# Patient Record
Sex: FEMALE | Race: WHITE | NOT HISPANIC OR LATINO | Employment: OTHER | URBAN - METROPOLITAN AREA
[De-identification: names, ages, dates, MRNs, and addresses within clinical notes are randomized per-mention and may not be internally consistent; named-entity substitution may affect disease eponyms.]

---

## 2017-01-12 PROBLEM — R94.31 SHORTENED PR INTERVAL: Status: ACTIVE | Noted: 2017-01-12

## 2017-01-16 RX ORDER — TOLTERODINE TARTRATE 2 MG/1
TABLET, EXTENDED RELEASE ORAL
Start: 2017-01-16

## 2017-01-16 NOTE — TELEPHONE ENCOUNTER
Detrol      Last Written Prescription Date: 11/03/2016  Last Fill Quantity: 180,  # refills: 0   Last Office Visit with [facility], [facility] or [facility] prescribing provider: 09/28/2016

## 2017-01-23 DIAGNOSIS — K22.70 BARRETT'S ESOPHAGUS WITHOUT DYSPLASIA: ICD-10-CM

## 2017-01-23 DIAGNOSIS — R32 INCONTINENCE: Primary | ICD-10-CM

## 2017-01-23 RX ORDER — TOLTERODINE TARTRATE 2 MG/1
TABLET, EXTENDED RELEASE ORAL
Qty: 60 TABLET | Refills: 0 | Status: SHIPPED | OUTPATIENT
Start: 2017-01-23 | End: 2017-03-01

## 2017-01-23 NOTE — TELEPHONE ENCOUNTER
MP,  Please advise in CW's absence.    Omeprazole:   Routing refill request to provider for review/approval because:  Last Rx was for BID instructions, per last OV note, pt now taking QD only    Detrol: Prescription approved per [facility] Refill Protocol.  Bernadette MCCULLOUGH RN    Pending Prescriptions:                       Disp   Refills    omeprazole (PRILOSEC) 20 MG CR capsule [Ph*90 cap*2        Sig: TAKE 1 CAPSULE (20 MG) BY MOUTH 2 TIMES DAILY    tolterodine (DETROL) 2 MG tablet [Pharmacy*180 ta*0        Sig: TAKE ONE TABLET BY MOUTH TWICE DAILY     Omeprazole      Last Written Prescription Date: 1/5/2016  Last Fill Quantity: 180,  # refills: 3   Last Office Visit with [facility], [facility] or [facility] prescribing provider: 9/28/2016                                             Detrol       Last Written Prescription Date: 11/3/2016  Last Fill Quantity: 180,  # refills: 0   Last Office Visit with [facility], [facility] or [facility] prescribing provider: 9/28/2016

## 2017-03-01 ENCOUNTER — OFFICE VISIT (OUTPATIENT)
Dept: FAMILY MEDICINE | Facility: CLINIC | Age: 72
End: 2017-03-01
Payer: COMMERCIAL

## 2017-03-01 VITALS
SYSTOLIC BLOOD PRESSURE: 130 MMHG | HEART RATE: 77 BPM | HEIGHT: 69 IN | BODY MASS INDEX: 40.46 KG/M2 | OXYGEN SATURATION: 97 % | WEIGHT: 273.2 LBS | TEMPERATURE: 97.7 F | DIASTOLIC BLOOD PRESSURE: 78 MMHG

## 2017-03-01 DIAGNOSIS — R32 INCONTINENCE: ICD-10-CM

## 2017-03-01 DIAGNOSIS — E78.5 HYPERLIPIDEMIA LDL GOAL <130: ICD-10-CM

## 2017-03-01 DIAGNOSIS — L60.2 ONYCHOGRYPOSIS: ICD-10-CM

## 2017-03-01 DIAGNOSIS — Z00.01 ENCOUNTER FOR ROUTINE ADULT MEDICAL EXAM WITH ABNORMAL FINDINGS: Primary | ICD-10-CM

## 2017-03-01 DIAGNOSIS — R60.1 GENERALIZED EDEMA: ICD-10-CM

## 2017-03-01 DIAGNOSIS — A60.04 HERPES SIMPLEX VULVOVAGINITIS: ICD-10-CM

## 2017-03-01 DIAGNOSIS — K22.70 BARRETT'S ESOPHAGUS WITHOUT DYSPLASIA: ICD-10-CM

## 2017-03-01 DIAGNOSIS — H90.8 MIXED HEARING LOSS: ICD-10-CM
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
## 2017-03-01 PROCEDURE — 80061 LIPID PANEL: CPT | Performed by: FAMILY MEDICINE

## 2017-03-01 PROCEDURE — 36415 COLL VENOUS BLD VENIPUNCTURE: CPT | Performed by: FAMILY MEDICINE

## 2017-03-01 PROCEDURE — 84443 ASSAY THYROID STIM HORMONE: CPT | Performed by: FAMILY MEDICINE

## 2017-03-01 PROCEDURE — 80053 COMPREHEN METABOLIC PANEL: CPT | Performed by: FAMILY MEDICINE

## 2017-03-01 PROCEDURE — 99397 PER PM REEVAL EST PAT 65+ YR: CPT | Performed by: FAMILY MEDICINE

## 2017-03-01 PROCEDURE — 86803 HEPATITIS C AB TEST: CPT | Performed by: FAMILY MEDICINE

## 2017-03-01 PROCEDURE — 82043 UR ALBUMIN QUANTITATIVE: CPT | Performed by: FAMILY MEDICINE

## 2017-03-01 RX ORDER — SIMVASTATIN 20 MG
TABLET ORAL
Qty: 90 TABLET | Refills: 3 | Status: SHIPPED | OUTPATIENT
Start: 2017-03-01 | End: 2018-04-11

## 2017-03-01 RX ORDER — HYDROCHLOROTHIAZIDE 25 MG/1
TABLET ORAL
Qty: 90 TABLET | Refills: 1 | Status: SHIPPED | OUTPATIENT
Start: 2017-03-01 | End: 2017-10-17

## 2017-03-01 RX ORDER — VALACYCLOVIR HYDROCHLORIDE 500 MG/1
TABLET, FILM COATED ORAL
Qty: 36 TABLET | Refills: 1 | Status: SHIPPED | OUTPATIENT
Start: 2017-03-01 | End: 2018-04-21

## 2017-03-01 RX ORDER — TOLTERODINE TARTRATE 2 MG/1
2 TABLET, EXTENDED RELEASE ORAL 2 TIMES DAILY
Qty: 180 TABLET | Refills: 1 | Status: SHIPPED | OUTPATIENT
Start: 2017-03-01 | End: 2017-09-10

## 2017-03-01 NOTE — PROGRESS NOTES
SUBJECTIVE:                                                            Hiro Carranza is a 71 year old female who presents for Preventive Visit.  Are you in the first 12 months of your Medicare Part B coverage?  No    Healthy Habits:    Do you get at least three servings of calcium containing foods daily (dairy, green leafy vegetables, etc.)? no, taking calcium and/or vitamin D supplement: yes - vitamins     Amount of exercise or daily activities, outside of work: not currently but committed to at least 5 days a week    Problems taking medications regularly No    Medication side effects: No    Have you had an eye exam in the past two years? yes    Do you see a dentist twice per year? Yearly     Do you have sleep apnea, excessive snoring or daytime drowsiness? little bit of apnea -had sleep study nothing concerning, did not rec cpap     COGNITIVE SCREEN  1) Repeat 3 items (Banana, Sunrise, Chair)    2) Clock draw: NORMAL  3) 3 item recall: Recalls 3 objects  Results: 3 items recalled: COGNITIVE IMPAIRMENT LESS LIKELY    Mini-CogTM Copyright S [employer]. Licensed by the author for use in [facility]; reprinted with permission (triny@.[email or web address]). All rights reserved.        --Thyroid/iodine concerns-  Had been buying salt without iodine- wondering if we can add thyroid check to labs.  Already getting salt with iodine in it at home, but hasn't had it for awhile.    --Stressors- moved her 97 yo mother into assisted living/NH.  's brother  recently of cancer, h/o crohns.    --Toenail clipping organization?? Having a hard time clipping her nails.    --Hearing loss in left ear.   is going to hearing aide place.    --Balance-   Hasn't had any more falls, didn't f/u on referral.  Does think she'd have trouble getting up if she did fall.  Can't kneel due to concerns about her knees.      Reviewed and updated as needed this visit by clinical staff  Tobacco  Allergies  Meds  Problems       Reviewed  "and updated as needed this visit by Provider  Allergies  Meds  Problems        Social History   Substance Use Topics     Smoking status: Former Smoker     Quit date: 1/1/1985     Smokeless tobacco: Never Used      Comment: 20 yrs smoking '65-'85, 1 PPD     Alcohol use 0.0 oz/week     0 Standard drinks or equivalent per week      Comment: Occasional 2 beers weekly. 1-2 glasses wime monthly       Once a week 1 glass of wine or beer    Today's PHQ-2 Score:   PHQ-2 ( 1999 Pf[employer]) 12/18/2015 7/1/2015   Q1: Little interest or pleasure in doing things 0 0   Q2: Feeling down, depressed or hopeless 0 0   PHQ-2 Score 0 0       Do you feel safe in your environment - Yes    Do you have a Health Care Directive?: Yes: Advance Directive has been received and scanned.    Current providers sharing in care for this patient include:   Patient Care Team:  Marie Robledo MD as PCP - General      Hearing impairment: Yes, Lt ear \"getting weak\"    Ability to successfully perform activities of daily living: Yes, no assistance needed     Fall risk:  Fallen 2 or more times in the past year?: No  Any fall with injury in the past year?: No    Home safety:  none identified    The following health maintenance items are reviewed in Epic and correct as of today:  Health Maintenance   Topic Date Due     PNEUMOCOCCAL (2 of 2 - PPSV23) 07/01/2016     FALL RISK ASSESSMENT  12/18/2016     INFLUENZA VACCINE (SYSTEM ASSIGNED)  09/01/2017     TETANUS IMMUNIZATION (SYSTEM ASSIGNED)  01/23/2018     MAMMO SCREEN Q2 YR (SYSTEM ASSIGNED)  02/04/2018     COLONOSCOPY Q10 YR INBASKET MESSAGE  02/22/2018     ADVANCE DIRECTIVE PLANNING Q5 YRS (NO INBASKET)  12/18/2020     LIPID SCREEN Q5 YR FEMALE (SYSTEM ASSIGNED)  03/01/2022     DEXA SCAN SCREENING (SYSTEM ASSIGNED)  Completed     HEPATITIS C SCREENING  Completed       Patient Active Problem List   Diagnosis     Stallings esophagus     Incontinence     Pain in shoulder     Osteopenia     Onychogryposis " and onychomycosis     Hyperlipidemia LDL goal <130     Other viral warts     Genital herpes     Advance Care Planning     Gastroesophageal reflux disease with esophagitis     Other dental procedure status     Arthritis of knee, right     Acute posthemorrhagic anemia     Physical deconditioning     Constipation     MARK (obstructive sleep apnea)(Mild AHI=5)     Calculus of gallbladder without cholecystitis without obstruction     Cholelithiases     Shortened CA interval      Current Outpatient Prescriptions   Medication Sig Dispense Refill     tolterodine (DETROL) 2 MG tablet Take 1 tablet (2 mg) by mouth 2 times daily 180 tablet 1     simvastatin (ZOCOR) 20 MG tablet TAKE 1 TABLET (20 MG) BY MOUTH AT BEDTIME 90 tablet 3     omeprazole (PRILOSEC) 20 MG CR capsule Take 1 capsule (20 mg) by mouth 2 times daily 180 capsule 1     valACYclovir (VALTREX) 500 MG tablet TAKE 1 TABLET (500 MG) BY MOUTH 2 TIMES DAILY 36 tablet 1     hydrochlorothiazide (HYDRODIURIL) 25 MG tablet TAKE 1 TABLET (25 MG) BY MOUTH DAILY 90 tablet 1     Probiotic Product (PROBIOTIC PO) Take by mouth daily       Cholecalciferol (VITAMIN D3 PO) Take by mouth daily       GLUCOSAMINE-CHONDROITIN PO        polyethylene glycol 0.4%- propylene glycol 0.3% (SYSTANE ULTRA) 0.4-0.3 % SOLN ophthalmic solution Place 1 drop into both eyes At Bedtime       clotrimazole (LOTRIMIN) 1 % cream Apply topically 2 times daily 60 g 1     DAILY MULTIVITAMIN PO DAILY       FISH  MG OR CAPS 2 tablets daily           Allergies   Allergen Reactions     No Known Drug Allergies         ROS:  Constitutional, HEENT, cardiovascular, pulmonary, gi and gu systems are negative, except as otherwise noted.    Problem list, Medication list, Allergies, and Medical/Social/Surgical histories reviewed in [facility] and updated as appropriate.  Labs reviewed in EPIC  BP Readings from Last 3 Encounters:   03/01/17 130/78   09/28/16 126/80   08/05/16 116/40    Wt Readings from Last 3  "Encounters:   03/01/17 273 lb 3.2 oz (123.9 kg)   09/28/16 274 lb 6.4 oz (124.5 kg)   08/04/16 273 lb 8 oz (124.1 kg)                  Recent Labs   Lab Test  03/01/17   1226  07/13/16   1128  06/28/16   1454 06/21/16   12/18/15   1309   12/03/14   1320   LDL  97   --    --    --    --   85   --   93   HDL  65   --    --    --    --   75   --   69   TRIG  107   --    --    --    --   65   --   90   ALT  19   --   26  37   --   21   --    --    CR  0.69  0.72  0.76  0.82   < >  0.66   < >  0.71   GFRESTIMATED  83  80  75  >60   < >  89   < >  81   GFRESTBLACK  >90   GFR Calc    >90   GFR Calc    >90   GFR Calc     --    < >  >90   GFR Calc     < >  >90   GFR Calc     POTASSIUM  4.7  4.1  4.2  3.3   < >  4.1   < >  4.3   TSH  1.13   --    --    --    --    --    --    --     < > = values in this interval not displayed.      OBJECTIVE:                                                            /78  Pulse 77  Temp 97.7  F (36.5  C) (Oral)  Ht 5' 9\" (1.753 m)  Wt 273 lb 3.2 oz (123.9 kg)  SpO2 97%  Breastfeeding? No  BMI 40.34 kg/m2 Estimated body mass index is 40.34 kg/(m^2) as calculated from the following:    Height as of this encounter: 5' 9\" (1.753 m).    Weight as of this encounter: 273 lb 3.2 oz (123.9 kg).  EXAM:   GENERAL APPEARANCE: healthy, alert and no distress  EYES: Eyes grossly normal to inspection, PERRL and conjunctivae and sclerae normal  HENT: ear canals and TM's normal, nose and mouth without ulcers or lesions, oropharynx clear and oral mucous membranes moist  NECK: no adenopathy, no asymmetry, masses, or scars and thyroid normal to palpation  RESP: lungs clear to auscultation - no rales, rhonchi or wheezes  BREAST: normal without masses, tenderness or nipple discharge and no palpable axillary masses or adenopathy  CV: regular rate and rhythm, normal S1 S2, no S3 or S4, no murmur, click or rub, no peripheral edema " and peripheral pulses strong  ABDOMEN: soft, nontender, no hepatosplenomegaly, no masses and bowel sounds normal  MS: no musculoskeletal defects are noted and gait is age appropriate without ataxia  SKIN: no suspicious lesions or rashes  NEURO: Normal strength and tone, sensory exam grossly normal, mentation intact and speech normal  PSYCH: mentation appears normal and affect normal/bright    ASSESSMENT / PLAN:                                                                ICD-10-CM    1. Encounter for routine adult medical exam with abnormal findings Z00.01 Hepatitis C Screen Reflex to HCV RNA Quant and Genotype     Albumin Random Urine Quantitative   2. Fall, subsequent encounter W19.XXXD TSH with free T4 reflex     PHYSICAL THERAPY REFERRAL   3. Mixed hearing loss H90.8 TSH with free T4 reflex     AUDIOLOGY ADULT REFERRAL   4. Onychogryposis and onychomycosis L60.2    5. Incontinence R32 tolterodine (DETROL) 2 MG tablet   6. Hyperlipidemia LDL goal <130 E78.5 Lipid Profile with reflex to direct LDL     Comprehensive metabolic panel (BMP + Alb, Alk Phos, ALT, AST, Total. Bili, TP)     simvastatin (ZOCOR) 20 MG tablet   7. 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 Bili, TP)     omeprazole (PRILOSEC) 20 MG CR capsule   8. Herpes simplex vulvovaginitis A60.04 valACYclovir (VALTREX) 500 MG tablet   9. Generalized edema R60.1 TSH with free T4 reflex     Comprehensive metabolic panel (BMP + Alb, Alk Phos, ALT, AST, Total. Bili, TP)     hydrochlorothiazide (HYDRODIURIL) 25 MG tablet     CPE- Discussed diet, calcium and exercise.  Went over Self Breast Exam.  Thin prep pap was not done.  Eyes and Teeth or UTD or recommended f/u.  No immunizations needed today.  See orders below for tests ordered and screening needed.      H/o Falls- none recently, but feels like she would have a hard time getting up if she falls.  Referred for mobility/balance rehab  "with OT.    Hearing concerns- referred to audiology.    H/o onychomycosis/difficulty trimming nails- triage helped find resource information- in pt instructions as well.  Toenail Groups-  [facility] [phone]  [facility] [phone]    Lipids- on simvastatin- discussed new guidelines, can consider switching to other statin, but she's been stable without se's, so will cont for now.  Fasting today- will check labs.    Stallings's- UTD on scopes- due for f/u next year.  Cont PPI per GI recs.    Herpes- valtrex continues to help- refills sent.    Edema- cont HCTZ- helpful.  BP borderline today.    Shortened PA interval- reviewed with pt - shortened PA, but stable over the years, and normal QRS.      End of Life Planning:  Patient currently has an advanced directive: Yes.  Practitioner is supportive of decision.    COUNSELING:  Reviewed preventive health counseling, as reflected in patient instructions    BP Screening:   Last 3 BP Readings:    BP Readings from Last 3 Encounters:   03/01/17 130/78   09/28/16 126/80   08/05/16 116/40       The following was recommended to the patient:  Re-screen BP within a year and recommended lifestyle modifications    Estimated body mass index is 40.34 kg/(m^2) as calculated from the following:    Height as of this encounter: 5' 9\" (1.753 m).    Weight as of this encounter: 273 lb 3.2 oz (123.9 kg).  Weight management plan: Cont work on diet/exercise   reports that she quit smoking about 32 years ago. She has never used smokeless tobacco.      Appropriate preventive services were discussed with this patient, including applicable screening as appropriate for cardiovascular disease, diabetes, osteopenia/osteoporosis, and glaucoma.  As appropriate for age/gender, discussed screening for colorectal cancer, prostate cancer, breast cancer, and cervical cancer. Checklist reviewing preventive services available has been given to the patient.    Reviewed patients plan of care and provided an " AVS. The Basic Care Plan (routine screening as documented in Health Maintenance) for Hiro meets the Care Plan requirement. This Care Plan has been established and reviewed with the Patient.    Counseling Resources:  ATP IV Guidelines  Pooled Cohorts Equation Calculator  Breast Cancer Risk Calculator  FRAX Risk Assessment  ICSI Preventive Guidelines  Dietary Guidelines for Americans, 2010  USDA's MyPlate  ASA Prophylaxis  Lung CA Screening    Marie Robledo MD  [facility]

## 2017-03-01 NOTE — PATIENT INSTRUCTIONS
Toenail Groups-  [facility] [phone]  [facility] [phone]        Preventive Health Recommendations    Female Ages 65 +    Yearly exam:     See your health care provider every year in order to  o Review health changes.   o Discuss preventive care.    o Review your medicines if your doctor has prescribed any.      You no longer need a yearly Pap test unless you've had an abnormal Pap test in the past 10 years. If you have vaginal symptoms, such as bleeding or discharge, be sure to talk with your provider about a Pap test.      Every 1 to 2 years, have a mammogram.  If you are over 69, talk with your health care provider about whether or not you want to continue having screening mammograms.      Every 10 years, have a colonoscopy. Or, have a yearly FIT test (stool test). These exams will check for colon cancer.       Have a cholesterol test every 5 years, or more often if your doctor advises it.       Have a diabetes test (fasting glucose) every three years. If you are at risk for diabetes, you should have this test more often.       At age 65, have a bone density scan (DEXA) to check for osteoporosis (brittle bone disease).    Shots:    Get a flu shot each year.    Get a tetanus shot every 10 years.    Talk to your doctor about your pneumonia vaccines. There are now two you should receive - Pneumovax (PPSV 23) and Prevnar (PCV 13).    Talk to your doctor about the shingles vaccine.    Talk to your doctor about the hepatitis B vaccine.    Nutrition:     Eat at least 5 servings of fruits and vegetables each day.      Eat whole-grain bread, whole-wheat pasta and brown rice instead of white grains and rice.      Talk to your provider about Calcium and Vitamin D.     Lifestyle    Exercise at least 150 minutes a week (30 minutes a day, 5 days a week). This will help you control your weight and prevent disease.      Limit alcohol to one drink per day.      No smoking.       Wear sunscreen to prevent skin cancer.        See your dentist twice a year for an exam and cleaning.      See your eye doctor every 1 to 2 years to screen for conditions such as glaucoma, macular degeneration and cataracts.

## 2017-03-01 NOTE — LETTER
[facility]  [address], [address]  [place], [place] [address]  [phone]     March 7, 2017     Hiro Carranza                                                                                                                               [address] [place] [id number]        Dear Hiro,    Here are your lab results from your recent visit...  -Your CMP (which includes electrolyte levels, blood sugar levels, and kidney and liver function tests) looks very good.  -Your microalbumin level (which is the urine test that can signal signs of early chronic kidney disease if elevated to >30) is low, which is also good to see.  -Your TSH (thyroid stimulating hormone, which is elevated in hypothyroidism, and lowered in hyperthyroidism) looks good as well.  -Your cholesterol panel looks very good with a low LDL (the bad cholesterol) and a higher HDL (the good cholesterol).   -Your hepatitis C antibody is negative.  We've been checking this as a one-time screening test for those born between 1945 and 1965 due to the slightly higher risk in this age group.  You should not need to be tested for this again.       Please let me know if you have any questions.        Sincerely,    Marie Robledo MD      Clinic hours:  Monday   7:30 AM - 5:00 PM    Tuesday  7:00 AM - 7:00 PM    Wednesday  7:00 AM - 5:00 PM    Thursday  7:30 AM - 7:00 PM    Friday   7:30 AM - 5:00 PM

## 2017-03-01 NOTE — MR AVS SNAPSHOT
After Visit Summary   3/1/2017    Hiro Carranza    MRN: [id number]           Patient Information     Date Of Birth          1945        Visit Information        Provider Department      3/1/2017 10:30 AM Marie Robledo MD [facility]        Today's Diagnoses     Encounter for routine adult medical exam with abnormal findings    -  1    Fall, subsequent encounter        Mixed hearing loss        Onychogryposis and onychomycosis        Incontinence        Hyperlipidemia LDL goal <130        Stallings's esophagus without dysplasia        Herpes simplex vulvovaginitis        Generalized edema          Care Instructions    Toenail Groups-  [employer] [phone]  [employer] [phone]        Preventive Health Recommendations    Female Ages 65 +    Yearly exam:     See your health care provider every year in order to  o Review health changes.   o Discuss preventive care.    o Review your medicines if your doctor has prescribed any.      You no longer need a yearly Pap test unless you've had an abnormal Pap test in the past 10 years. If you have vaginal symptoms, such as bleeding or discharge, be sure to talk with your provider about a Pap test.      Every 1 to 2 years, have a mammogram.  If you are over 69, talk with your health care provider about whether or not you want to continue having screening mammograms.      Every 10 years, have a colonoscopy. Or, have a yearly FIT test (stool test). These exams will check for colon cancer.       Have a cholesterol test every 5 years, or more often if your doctor advises it.       Have a diabetes test (fasting glucose) every three years. If you are at risk for diabetes, you should have this test more often.       At age 65, have a bone density scan (DEXA) to check for osteoporosis (brittle bone disease).    Shots:    Get a flu shot each year.    Get a tetanus shot every 10 years.    Talk to your doctor about your pneumonia vaccines. There  are now two you should receive - Pneumovax (PPSV 23) and Prevnar (PCV 13).    Talk to your doctor about the shingles vaccine.    Talk to your doctor about the hepatitis B vaccine.    Nutrition:     Eat at least 5 servings of fruits and vegetables each day.      Eat whole-grain bread, whole-wheat pasta and brown rice instead of white grains and rice.      Talk to your provider about Calcium and Vitamin D.     Lifestyle    Exercise at least 150 minutes a week (30 minutes a day, 5 days a week). This will help you control your weight and prevent disease.      Limit alcohol to one drink per day.      No smoking.       Wear sunscreen to prevent skin cancer.       See your dentist twice a year for an exam and cleaning.      See your eye doctor every 1 to 2 years to screen for conditions such as glaucoma, macular degeneration and cataracts.        Follow-ups after your visit        Additional Services     AUDIOLOGY ADULT REFERRAL       Your provider has referred you to: [facility]: Audiology and Aural Rehab Services [facility] [phone]   [email or web address]  [facility]: [facility] [phone]   [email or web address]    Specialty Testing:  Audiogram Only            PHYSICAL THERAPY REFERRAL       *This therapy referral will be filtered to a centralized scheduling office at [facility] and the patient will receive a call to schedule an appointment at a [place] location most convenient for them. *     [facility] provides Physical Therapy evaluation and treatment and many specialty services across the [facility].  If requesting a specialty program, please choose from the list below.    If you have not heard from the scheduling office within 2 business days, please call [phone] for all locations, with the exception of [place], please call  "[phone].  Treatment: Evaluation & Treatment  Special Instructions/Modalities:   Special Programs: Balance/Vestibular  - mobility concerns, concerns about getting up if she falls    Please be aware that coverage of these services is subject to the terms and limitations of your health insurance plan.  Call member services at your health plan with any benefit or coverage questions.      **Note to Provider:  If you are referring outside of [place] for the therapy appointment, please list the name of the location in the  special instructions  above, print the referral and give to the patient to schedule the appointment.                  Who to contact     If you have questions or need follow up information about today's clinic visit or your schedule please contact [facility] directly at [phone].  Normal or non-critical lab and imaging results will be communicated to you by [employer], letter or phone within 4 business days after the clinic has received the results. If you do not hear from us within 7 days, please contact the clinic through [employer] or phone. If you have a critical or abnormal lab result, we will notify you by phone as soon as possible.  Submit refill requests through [employer] or call your pharmacy and they will forward the refill request to us. Please allow 3 business days for your refill to be completed.          Additional Information About Your Visit        [employer] Information     [employer] lets you send messages to your doctor, view your test results, renew your prescriptions, schedule appointments and more. To sign up, go to [email or web address] . Click on \"Log in\" on the left side of the screen, which will take you to the Welcome page. Then click on \"Sign up Now\" on the right side of the page.     You will be asked to enter the access code listed below, as well as some personal information. Please follow the directions to create your username and password.     Your access code " "is: [email or web address]  Expires: 2017 12:02 PM     Your access code will  in 90 days. If you need help or a new code, please call your [place] clinic or [phone].        Care EveryWhere ID     This is your Care EveryWhere ID. This could be used by other organizations to access your [place] medical records  [id number]        Your Vitals Were     Pulse Temperature Height Pulse Oximetry Breastfeeding? BMI (Body Mass Index)    77 97.7  F (36.5  C) (Oral) 5' 9\" (1.753 m) 97% No 40.34 kg/m2       Blood Pressure from Last 3 Encounters:   17 130/78   16 126/80   16 116/40    Weight from Last 3 Encounters:   17 273 lb 3.2 oz (123.9 kg)   16 274 lb 6.4 oz (124.5 kg)   16 273 lb 8 oz (124.1 kg)              We Performed the Following     Albumin Random Urine Quantitative     AUDIOLOGY ADULT REFERRAL     Comprehensive metabolic panel (BMP + Alb, Alk Phos, ALT, AST, Total. Bili, TP)     Hepatitis C Screen Reflex to HCV RNA Quant and Genotype     Lipid Profile with reflex to direct LDL     PHYSICAL THERAPY REFERRAL     TSH with free T4 reflex          Today's Medication Changes          These changes are accurate as of: 3/1/17 12:02 PM.  If you have any questions, ask your nurse or doctor.               These medicines have changed or have updated prescriptions.        Dose/Directions    simvastatin 20 MG tablet   Commonly known as:  ZOCOR   This may have changed:  See the new instructions.   Used for:  Hyperlipidemia LDL goal <130   Changed by:  Marie Robledo MD        TAKE 1 TABLET (20 MG) BY MOUTH AT BEDTIME   Quantity:  90 tablet   Refills:  3       tolterodine 2 MG tablet   Commonly known as:  DETROL   This may have changed:  See the new instructions.   Used for:  Incontinence   Changed by:  Marie Robledo MD        Dose:  2 mg   Take 1 tablet (2 mg) by mouth 2 times daily   Quantity:  180 tablet   Refills:  1       valACYclovir 500 MG tablet   Commonly known " as:  VALTREX   This may have changed:  See the new instructions.   Used for:  Herpes simplex vulvovaginitis   Changed by:  Marie Robledo MD        TAKE 1 TABLET (500 MG) BY MOUTH 2 TIMES DAILY   Quantity:  36 tablet   Refills:  1            Where to get your medicines      These medications were sent to [id number] - [place], [place] - [address]  [address] [facility] [address]     Phone:  [phone]     hydrochlorothiazide 25 MG tablet    omeprazole 20 MG CR capsule    simvastatin 20 MG tablet    tolterodine 2 MG tablet    valACYclovir 500 MG tablet                Primary Care Provider Office Phone # Fax #    Marie Robledo [phone]       [facility] [address]  [facility] [address]        Thank you!     Thank you for choosing [facility]  for your care. Our goal is always to provide you with excellent care. Hearing back from our patients is one way we can continue to improve our services. Please take a few minutes to complete the written survey that you may receive in the mail after your visit with us. Thank you!             Your Updated Medication List - Protect others around you: Learn how to safely use, store and throw away your medicines at www.disposemymeds.org.          This list is accurate as of: 3/1/17 12:02 PM.  Always use your most recent med list.                   Brand Name Dispense Instructions for use    clotrimazole 1 % cream    LOTRIMIN    60 g    Apply topically 2 times daily       DAILY MULTIVITAMIN PO      DAILY       Fish Oil 500 MG Caps      2 tablets daily       GLUCOSAMINE-CHONDROITIN PO          hydrochlorothiazide 25 MG tablet    HYDRODIURIL    90 tablet    TAKE 1 TABLET (25 MG) BY MOUTH DAILY       omeprazole 20 MG CR capsule    priLOSEC    180 capsule    Take 1 capsule (20 mg) by mouth 2 times daily       polyethylene glycol 0.4%- propylene glycol 0.3% 0.4-0.3 % Soln ophthalmic solution    SYSTANE  ULTRA     Place 1 drop into both eyes At Bedtime       PROBIOTIC PO      Take by mouth daily       simvastatin 20 MG tablet    ZOCOR    90 tablet    TAKE 1 TABLET (20 MG) BY MOUTH AT BEDTIME       tolterodine 2 MG tablet    DETROL    180 tablet    Take 1 tablet (2 mg) by mouth 2 times daily       valACYclovir 500 MG tablet    VALTREX    36 tablet    TAKE 1 TABLET (500 MG) BY MOUTH 2 TIMES DAILY       VITAMIN D3 PO      Take by mouth daily

## 2017-03-02 LAB
ALBUMIN SERPL-MCNC: 3.7 G/DL (ref 3.4–5)
ALP SERPL-CCNC: 99 U/L (ref 40–150)
ALT SERPL W P-5'-P-CCNC: 19 U/L (ref 0–50)
ANION GAP SERPL CALCULATED.3IONS-SCNC: 6 MMOL/L (ref 3–14)
AST SERPL W P-5'-P-CCNC: 17 U/L (ref 0–45)
BILIRUB SERPL-MCNC: 0.6 MG/DL (ref 0.2–1.3)
BUN SERPL-MCNC: 15 MG/DL (ref 7–30)
CALCIUM SERPL-MCNC: 9.3 MG/DL (ref 8.5–10.1)
CHLORIDE SERPL-SCNC: 106 MMOL/L (ref 94–109)
CHOLEST SERPL-MCNC: 183 MG/DL
CO2 SERPL-SCNC: 30 MMOL/L (ref 20–32)
CREAT SERPL-MCNC: 0.69 MG/DL (ref 0.52–1.04)
CREAT UR-MCNC: 157 MG/DL
GFR SERPL CREATININE-BSD FRML MDRD: 83 ML/MIN/1.7M2
GLUCOSE SERPL-MCNC: 86 MG/DL (ref 70–99)
HCV AB SERPL QL IA: NORMAL
HDLC SERPL-MCNC: 65 MG/DL
LDLC SERPL CALC-MCNC: 97 MG/DL
MICROALBUMIN UR-MCNC: 7 MG/L
MICROALBUMIN/CREAT UR: 4.29 MG/G CR (ref 0–25)
NONHDLC SERPL-MCNC: 118 MG/DL
POTASSIUM SERPL-SCNC: 4.7 MMOL/L (ref 3.4–5.3)
PROT SERPL-MCNC: 7.6 G/DL (ref 6.8–8.8)
SODIUM SERPL-SCNC: 142 MMOL/L (ref 133–144)
TRIGL SERPL-MCNC: 107 MG/DL
TSH SERPL DL<=0.005 MIU/L-ACNC: 1.13 MU/L (ref 0.4–4)

## 2017-03-07 NOTE — PROGRESS NOTES
Please send letter below with copy of results.  Thanks! CW    Here are your lab results from your recent visit...  -Your CMP (which includes electrolyte levels, blood sugar levels, and kidney and liver function tests) looks very good.  -Your microalbumin level (which is the urine test that can signal signs of early chronic kidney disease if elevated to >30) is low, which is also good to see.  -Your TSH (thyroid stimulating hormone, which is elevated in hypothyroidism, and lowered in hyperthyroidism) looks good as well.  -Your cholesterol panel looks very good with a low LDL (the bad cholesterol) and a higher HDL (the good cholesterol).   -Your hepatitis C antibody is negative.  We've been checking this as a one-time screening test for those born between 1945 and 1965 due to the slightly higher risk in this age group.  You should not need to be tested for this again.       Please let me know if you have any questions.  Best,   Mitchell Robledo MD

## 2017-04-10 ENCOUNTER — HOSPITAL ENCOUNTER (OUTPATIENT)
Dept: PHYSICAL THERAPY | Facility: CLINIC | Age: 72
Setting detail: THERAPIES SERIES
End: 2017-04-10
Attending: FAMILY MEDICINE
Payer: MEDICARE

## 2017-04-10 PROCEDURE — 40000719 ZZHC STATISTIC PT DEPARTMENT NEURO VISIT: Performed by: PHYSICAL THERAPIST

## 2017-04-10 PROCEDURE — 97110 THERAPEUTIC EXERCISES: CPT | Mod: GP | Performed by: PHYSICAL THERAPIST

## 2017-04-10 PROCEDURE — G8978 MOBILITY CURRENT STATUS: HCPCS | Mod: GP,CJ | Performed by: PHYSICAL THERAPIST

## 2017-04-10 PROCEDURE — G8979 MOBILITY GOAL STATUS: HCPCS | Mod: GP,CJ | Performed by: PHYSICAL THERAPIST

## 2017-04-10 PROCEDURE — 97161 PT EVAL LOW COMPLEX 20 MIN: CPT | Mod: GP | Performed by: PHYSICAL THERAPIST

## 2017-04-10 NOTE — PROGRESS NOTES
[facility]        OUTPATIENT PHYSICAL THERAPY FUNCTIONAL EVALUATION  PLAN OF TREATMENT FOR OUTPATIENT REHABILITATION  (COMPLETE FOR INITIAL CLAIMS ONLY)  Patient's Last Name, First Name, M.I.  YOB: 1945  Hiro Carranza     Provider's Name   [facility]   Medical Record No.  [id number]     Start of Care Date:  04/10/17   Onset Date:  03/01/17   Type:     _X__PT   ____OT  ____SLP Medical Diagnosis:  Falls, subsequent encounter     PT Diagnosis:  difficulty with gait Visits from SOC:  1                              __________________________________________________________________________________  Plan of Treatment/Functional Goals:  balance training, gait training, ROM, strengthening, stretching           GOALS  FLOOR TRANSFER  1. Patient will be independent with a supine<>stand floor transfer to ensure improved confidence if pt were to fall she'd be able to rise up to standing.  06/09/17    ACTIVITY TOLERANCE  2. Patient will tolerate greater than 30 minutes of continous aerobic activity and LE strengthening exercises at least 5 days per week to assist with management of knee pain   06/09/17    Therapy Frequency:  1 time/week   Predicted Duration of Therapy Intervention:  5 weeks    Alia Cervantes, PT                                    I CERTIFY THE NEED FOR THESE SERVICES FURNISHED UNDER        THIS PLAN OF TREATMENT AND WHILE UNDER MY CARE     (Physician co-signature of this document indicates review and certification of the therapy plan).                Certification Date From:  04/10/17   Certification Date To:  06/09/17    Referring Provider:  Dr. Marie Robledo    Initial Assessment  See Epic Evaluation- Start of Care Date: 04/10/17

## 2017-04-10 NOTE — PROGRESS NOTES
04/10/17 1100   Quick Adds   Type of Visit Initial OP PT Evaluation   General Information   Start of Care Date 04/10/17   Referring Physician Dr. Marie Deluna   Orders Evaluate and Treat as Indicated   Order Date 03/01/17   Medical Diagnosis Falls, subsequential encounter   Onset of illness/injury or Date of Surgery 03/01/17   Precautions/Limitations fall precautions   Surgical/Medical history reviewed Yes   Pertinent history of current problem (include personal factors and/or comorbidities that impact the POC) Pt went in for routine yearly follow up with PCP and discussed concern to rise from standing if pt were to fall and PT order generated. Pt has not fallen in past year. PMH significant for Bilateral knee replacements 2007 and 2015, L RTC injury   Prior level of function comment independent   Current Community Support Family/friend caregiver   Patient role/Employment history Retired   Living environment House/[facility]   Home/Community Accessibility Comments 15 steps into basement and 3 steps to enter home   Current Assistive Devices Four Wheeled Walker;Standard Cane   Assistive Devices Comments Uses cane occasionally on unven surfaces on walks, patient's mother recently passed away and will be able to inherit 4WW for use   Patient/Family Goals Statement I want to be able to get up after I fall and start exercising again.   General Information Comments Pt's mother passed away 2 weeks ago and  is have shoulder scope this week. Pt lives in [place] and drives 40 miles for appointments   Fall Risk Screen   Fall screen completed by PT   Per patient - Fall 2 or more times in past year? No   Per patient - Fall with injury in past year? No   Timed Up and Go score (seconds) 12.2   Is patient a fall risk? No   Pain   Patient currently in pain Yes   Pain location bilateral knee joints, left greater than right.   Pain comments Pain is worse in prolonged sitting, walking feels best   Cognitive Status  Examination   Orientation orientation to person, place and time   Level of Consciousness alert   Follows Commands and Answers Questions 100% of the time   Range of Motion (ROM)   ROM Comment Lacks end range extension bilaterally by less than 5 degrees   Strength   Strength Comments Bilateral hip flexion 4-/5, knee flex/ext 4/5, ankle DF/PF   Bed Mobility   Bed Mobility Comments independent   Transfer Skills   Transfer Comments prefers to use UE to assist up to standing but is able to perform without from a higher surface.    Gait   Gait Comments Ambulates into clinic unaided, decreased step length but no LOB and appropriate foot clearance.   Balance   Balance Comments independently maintains NBOS EC x30s without LOB, increased sway and self corrects.    Sensory Examination   Sensory Perception Comments denies N/T   Planned Therapy Interventions   Planned Therapy Interventions balance training;gait training;ROM;strengthening;stretching   Clinical Impression   Criteria for Skilled Therapeutic Interventions Met yes, treatment indicated   PT Diagnosis difficulty with gait   Influenced by the following impairments fear of falling, decreased strength and unable to return to standing from seated or supine position without external assistance   Functional limitations due to impairments difficulty with gait, fear of falling and impaired balance during ourdoor ambulation   Clinical Presentation Stable/Uncomplicated   Clinical Presentation Rationale progressing as expected, falls occured 1+ year ago   Clinical Decision Making (Complexity) Low complexity   Therapy Frequency 1 time/week   Predicted Duration of Therapy Intervention (days/wks) 5 weeks   Risk & Benefits of therapy have been explained Yes   Patient, Family & other staff in agreement with plan of care Yes   Education Assessment   Preferred Learning Style Listening   Barriers to Learning No barriers   GOALS   PT Eval Goals 1;2   Goal 1   Goal Identifier FLOOR TRANSFER    Goal Description 1. Patient will be independent with a supine<>stand floor transfer to ensure improved confidence if pt were to fall she'd be able to rise up to standing.   Target Date 06/09/17   Goal 2   Goal Identifier ACTIVITY TOLERANCE   Goal Description 2. Patient will tolerate greater than 30 minutes of continous aerobic activity and LE strengthening exercises at least 5 days per week to assist with management of knee pain    Target Date 06/09/17   Total Evaluation Time   Total Evaluation Time (Minutes) 20   Therapy Certification   Certification date from 04/10/17   Certification date to 06/09/17   Medical Diagnosis Falls, subsequent encounter

## 2017-04-20 ENCOUNTER — HOSPITAL ENCOUNTER (OUTPATIENT)
Dept: PHYSICAL THERAPY | Facility: CLINIC | Age: 72
Setting detail: THERAPIES SERIES
End: 2017-04-20
Attending: FAMILY MEDICINE
Payer: MEDICARE

## 2017-04-20 PROCEDURE — 40000185 ZZHC STATISTIC PT OUTPT VISIT: Performed by: PHYSICAL THERAPIST

## 2017-04-20 PROCEDURE — 97110 THERAPEUTIC EXERCISES: CPT | Mod: GP | Performed by: PHYSICAL THERAPIST

## 2017-04-24 ENCOUNTER — HOSPITAL ENCOUNTER (OUTPATIENT)
Dept: PHYSICAL THERAPY | Facility: CLINIC | Age: 72
Setting detail: THERAPIES SERIES
End: 2017-04-24
Attending: FAMILY MEDICINE
Payer: MEDICARE

## 2017-04-24 PROCEDURE — 97110 THERAPEUTIC EXERCISES: CPT | Mod: GP | Performed by: PHYSICAL THERAPIST

## 2017-04-24 PROCEDURE — 40000185 ZZHC STATISTIC PT OUTPT VISIT: Performed by: PHYSICAL THERAPIST

## 2017-05-01 ENCOUNTER — HOSPITAL ENCOUNTER (OUTPATIENT)
Dept: PHYSICAL THERAPY | Facility: CLINIC | Age: 72
Setting detail: THERAPIES SERIES
End: 2017-05-01
Attending: FAMILY MEDICINE
Payer: MEDICARE

## 2017-05-01 PROCEDURE — 40000185 ZZHC STATISTIC PT OUTPT VISIT: Performed by: PHYSICAL THERAPIST

## 2017-05-01 PROCEDURE — 97110 THERAPEUTIC EXERCISES: CPT | Mod: GP | Performed by: PHYSICAL THERAPIST

## 2017-05-08 ENCOUNTER — HOSPITAL ENCOUNTER (OUTPATIENT)
Dept: PHYSICAL THERAPY | Facility: CLINIC | Age: 72
Setting detail: THERAPIES SERIES
End: 2017-05-08
Attending: FAMILY MEDICINE
Payer: MEDICARE

## 2017-05-08 PROCEDURE — 97110 THERAPEUTIC EXERCISES: CPT | Mod: GP | Performed by: PHYSICAL THERAPIST

## 2017-05-08 PROCEDURE — 40000719 ZZHC STATISTIC PT DEPARTMENT NEURO VISIT: Performed by: PHYSICAL THERAPIST

## 2017-06-01 ENCOUNTER — HOSPITAL ENCOUNTER (OUTPATIENT)
Dept: PHYSICAL THERAPY | Facility: CLINIC | Age: 72
Setting detail: THERAPIES SERIES
End: 2017-06-01
Attending: FAMILY MEDICINE
Payer: MEDICARE

## 2017-06-01 PROCEDURE — 97110 THERAPEUTIC EXERCISES: CPT | Mod: GP | Performed by: PHYSICAL THERAPIST

## 2017-06-01 PROCEDURE — G8979 MOBILITY GOAL STATUS: HCPCS | Mod: GP,CJ | Performed by: PHYSICAL THERAPIST

## 2017-06-01 PROCEDURE — G8980 MOBILITY D/C STATUS: HCPCS | Mod: GP,CJ | Performed by: PHYSICAL THERAPIST

## 2017-06-01 PROCEDURE — 40000185 ZZHC STATISTIC PT OUTPT VISIT: Performed by: PHYSICAL THERAPIST

## 2017-06-01 NOTE — PROGRESS NOTES
Outpatient Physical Therapy Discharge Note     Patient: Hiro Carranza  : 1945    Beginning/End Dates of Reporting Period:  4/10/17 to 2017    Referring Provider: Dr. Marie Deluna    Therapy Diagnosis: Difficulty with gait     Client Self Report: I've been faithful with most of my exercises, but a few I haven't been so good about doing.     Outcome Measures (most recent score):  AM-PAC  Basic Mobility Score Level  (Lower scores equate to lower levels of function): 57.62  AM-PAC  Daily Activity Score Level  (Lower scores equate to lower levels of function): 49.26  AM-PAC  Applied Cognitive Score Level  (Lower scores equate to lower levels of function): 50.85      Goals:  Goal Identifier FLOOR TRANSFER   Goal Description 1. Patient will be independent with a supine<>stand floor transfer to ensure improved confidence if pt were to fall she'd be able to rise up to standing.   Target Date 17   Date Met      Progress: goal met, very effortful     Goal Identifier ACTIVITY TOLERANCE   Goal Description 2. Patient will tolerate greater than 30 minutes of continous aerobic activity and LE strengthening exercises at least 5 days per week to assist with management of knee pain    Target Date 17   Date Met  17   Progress: GOAL MET         Progress Toward Goals:   Progress this reporting period: Goals met. Pt having ongoing knee pain and educated on specific strengthening exercises and an exercise program to continue with. Pt feels she will be able to follow through this summer and continue to perform walking/biking and strengthening exercises.     Plan:  Discharge from therapy.    Discharge:    Reason for Discharge: Patient has met all goals.    Equipment Issued: none    Discharge Plan: Patient to continue home program.    Thank you for the referral  Alia Cervantes DPT  [facility]  [phone]

## 2017-06-29 DIAGNOSIS — B37.2 INTERTRIGINOUS CANDIDIASIS: ICD-10-CM

## 2017-06-30 RX ORDER — CLOTRIMAZOLE 1 %
CREAM (GRAM) TOPICAL
Qty: 60 G | Refills: 0 | Status: SHIPPED | OUTPATIENT
Start: 2017-06-30 | End: 2017-10-17

## 2017-06-30 NOTE — TELEPHONE ENCOUNTER
Routing refill request to provider for review/approval because:  Drug not on the [facility] refill protocol   A break in medication  Looks like foot issues somewhat discussed (as far as nails go); was this discussed too?  Bernadette MCCULLOUGH RN

## 2017-06-30 NOTE — TELEPHONE ENCOUNTER
Ana Luisarimin       Last Written Prescription Date:  6-2-15  Last Fill Quantity: 60g,   # refills: 1  Last Office Visit with [facility], [facility] or  Health prescribing provider: 3-1-17  Future Office visit:       Routing refill request to provider for review/approval because:  Drug not on the [facility], [facility] or  Health refill protocol or controlled substance

## 2017-07-25 DIAGNOSIS — R60.1 GENERALIZED EDEMA: ICD-10-CM

## 2017-07-26 RX ORDER — HYDROCHLOROTHIAZIDE 25 MG/1
TABLET ORAL
Start: 2017-07-26

## 2017-09-10 DIAGNOSIS — K22.70 BARRETT'S ESOPHAGUS WITHOUT DYSPLASIA: ICD-10-CM

## 2017-09-10 DIAGNOSIS — N39.46 MIXED STRESS AND URGE URINARY INCONTINENCE: ICD-10-CM

## 2017-09-11 NOTE — TELEPHONE ENCOUNTER
Routing refill request to provider for review/approval because:  Do you want to see pt for 6 month f/u appt?  F/U not noted in recent OV  Thanks,  Bernadette MCCULLOUGH, RN    Pending Prescriptions:                       Disp   Refills    tolterodine (DETROL) 2 MG tablet [Pharmacy*180 ta*0        Sig: TAKE ONE TABLET BY MOUTH TWICE DAILY     omeprazole (PRILOSEC) 20 MG CR capsule [Ph*180 ca*0        Sig: TAKE ONE CAPSULE BY MOUTH TWICE DAILY     Detrol      Last Written Prescription Date: 3/1/2017  Last Fill Quantity: 180,  # refills: 1   Last Office Visit with [facility], [facility] or [facility] prescribing provider: 3/1/2017    Omeprazole      Last Written Prescription Date: 3/1/2017  Last Fill Quantity: 180,  # refills: 1   Last Office Visit with [facility], [facility] or [facility] prescribing provider: 3/1/2017

## 2017-09-14 RX ORDER — TOLTERODINE TARTRATE 2 MG/1
TABLET, EXTENDED RELEASE ORAL
Qty: 60 TABLET | Refills: 0 | Status: SHIPPED | OUTPATIENT
Start: 2017-09-14 | End: 2017-10-17

## 2017-09-14 NOTE — TELEPHONE ENCOUNTER
Sent in 1mo refills-  Nothing may have changed, but I am managing several meds/chronic conditions that are taking up too much of her yearly preventive visits- see last a/p.  Apologize for the delay, but would like her to come in for 6mo f/u as we have discussed.    Thank you!  CW

## 2017-09-14 NOTE — TELEPHONE ENCOUNTER
Left non detailed VM for pt asking that they callback and schedule appt with PCP within 1 month  Bernadette MCCULLOUGH RN

## 2017-09-14 NOTE — TELEPHONE ENCOUNTER
Pharmacy asked patient to call and check on script.      She does not believe that she will need to be seen yet, as nothing has changed.  Please let her know if she does need to schedule as she needs her medications.

## 2017-10-17 ENCOUNTER — OFFICE VISIT (OUTPATIENT)
Dept: FAMILY MEDICINE | Facility: CLINIC | Age: 72
End: 2017-10-17
Payer: COMMERCIAL

## 2017-10-17 VITALS
BODY MASS INDEX: 41.54 KG/M2 | OXYGEN SATURATION: 98 % | HEIGHT: 69 IN | WEIGHT: 280.5 LBS | SYSTOLIC BLOOD PRESSURE: 124 MMHG | DIASTOLIC BLOOD PRESSURE: 74 MMHG | HEART RATE: 89 BPM | TEMPERATURE: 97.3 F

## 2017-10-17 DIAGNOSIS — B37.2 INTERTRIGINOUS CANDIDIASIS: ICD-10-CM

## 2017-10-17 DIAGNOSIS — N39.46 MIXED STRESS AND URGE URINARY INCONTINENCE: ICD-10-CM

## 2017-10-17 DIAGNOSIS — Z23 NEED FOR PROPHYLACTIC VACCINATION AND INOCULATION AGAINST INFLUENZA: ICD-10-CM

## 2017-10-17 DIAGNOSIS — K22.70 BARRETT'S ESOPHAGUS WITHOUT DYSPLASIA: Primary | ICD-10-CM

## 2017-10-17 DIAGNOSIS — E78.5 HYPERLIPIDEMIA LDL GOAL <130: ICD-10-CM

## 2017-10-17 DIAGNOSIS — R60.1 GENERALIZED EDEMA: ICD-10-CM

## 2017-10-17 PROCEDURE — 90662 IIV NO PRSV INCREASED AG IM: CPT | Performed by: FAMILY MEDICINE

## 2017-10-17 PROCEDURE — 90471 IMMUNIZATION ADMIN: CPT | Performed by: FAMILY MEDICINE

## 2017-10-17 PROCEDURE — 99214 OFFICE O/P EST MOD 30 MIN: CPT | Mod: 25 | Performed by: FAMILY MEDICINE

## 2017-10-17 RX ORDER — TOLTERODINE TARTRATE 2 MG/1
2 TABLET, EXTENDED RELEASE ORAL 2 TIMES DAILY
Qty: 180 TABLET | Refills: 1 | Status: SHIPPED | OUTPATIENT
Start: 2017-10-17 | End: 2018-04-21

## 2017-10-17 RX ORDER — HYDROCHLOROTHIAZIDE 25 MG/1
TABLET ORAL
Qty: 90 TABLET | Refills: 1 | Status: SHIPPED | OUTPATIENT
Start: 2017-10-17 | End: 2018-04-21

## 2017-10-17 RX ORDER — CLOTRIMAZOLE 1 %
CREAM (GRAM) TOPICAL
Qty: 60 G | Refills: 0 | Status: SHIPPED | OUTPATIENT
Start: 2017-10-17 | End: 2018-06-08

## 2017-10-17 NOTE — MR AVS SNAPSHOT
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  Normal or non-critical lab and imaging results will be communicated to you by MyChart, letter or phone within 4 business days after the clinic has received the results. If you do not hear from us within 7 days, please contact the clinic through MyChart or phone. If you have a critical or abnormal lab result, we will notify you by phone as soon as possible.  Submit refill requests through [employer] or call your pharmacy and they will forward the refill request to us. Please allow 3 business days for your refill to be completed.          Additional Information About Your Visit        MyChart Information     [employer] lets you send messages to your doctor, view your test results, renew your prescriptions, schedule appointments and more. To sign up, go to [email or web address] . Click on \"Log in\" on the left side of the screen, which will take you to the Welcome page. Then click on \"Sign up Now\" on the right side of the page.     You will be asked to enter the access code listed below, as well as some personal information. Please follow the directions to create your username and password.     Your access code is: [email or web address]  Expires: 1/15/2018 " "11:32 AM     Your access code will  in 90 days. If you need help or a new code, please call your [facility] clinic or [phone].        Care EveryWhere ID     This is your Care EveryWhere ID. This could be used by other organizations to access your [facility] medical records  [id number]        Your Vitals Were     Pulse Temperature Height Pulse Oximetry Breastfeeding? BMI (Body Mass Index)    89 97.3  F (36.3  C) (Oral) 5' 9\" (1.753 m) 98% No 41.42 kg/m2       Blood Pressure from Last 3 Encounters:   10/17/17 124/74   17 130/78   16 126/80    Weight from Last 3 Encounters:   10/17/17 280 lb 8 oz (127.2 kg)   17 273 lb 3.2 oz (123.9 kg)   16 274 lb 6.4 oz (124.5 kg)              We Performed the Following     FLU VACCINE, INCREASED ANTIGEN, PRESV FREE, AGE 65+ [18950]     Vaccine Administration, Initial [72082]          Today's Medication Changes          These changes are accurate as of: 10/17/17 11:32 AM.  If you have any questions, ask your nurse or doctor.               These medicines have changed or have updated prescriptions.        Dose/Directions    clotrimazole 1 % cream   Commonly known as:  LOTRIMIN   This may have changed:  See the new instructions.   Used for:  Intertriginous candidiasis   Changed by:  Marie Robledo MD        APPLY TOPICALLY 2 TIMES DAILY   Quantity:  60 g   Refills:  0       omeprazole 20 MG CR capsule   Commonly known as:  priLOSEC   This may have changed:  See the new instructions.   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   Used for:  Mixed stress and urge urinary incontinence   Changed by:  Marie Robledo MD        Dose:  2 mg   Take 1 tablet (2 mg) by mouth 2 times " daily   Quantity:  180 tablet   Refills:  1            Where to get your medicines      These medications were sent to [id number] - [place], [place] - [address]  [address] [facility] [address]     Phone:  [phone]     clotrimazole 1 % cream    hydrochlorothiazide 25 MG tablet    omeprazole 20 MG CR capsule    tolterodine 2 MG tablet                Primary Care Provider Office Phone # Fax #    Marie Robledo [phone] [address] [address]        Equal Access to Services     [facility]: Hadii aad ku hadasho Soomaali, waaxda luqadaha, qaybta kaalmada adeegyada, hany johns . So [facility] [phone].    ATENCIÓN: Si habla español, tiene a choi disposición servicios gratuitos de asistencia lingüística. [facility] [phone].    We comply with applicable federal civil rights laws and [place] laws. We do not discriminate on the basis of race, color, national origin, age, disability, sex, sexual orientation, or gender identity.            Thank you!     Thank you for choosing [facility]  for your care. Our goal is always to provide you with excellent care. Hearing back from our patients is one way we can continue to improve our services. Please take a few minutes to complete the written survey that you may receive in the mail after your visit with us. Thank you!             Your Updated Medication List - Protect others around you: Learn how to safely use, store and throw away your medicines at www.disposemymeds.org.          This list is accurate as of: 10/17/17 11:32 AM.  Always use your most recent med list.                   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       polyethylene glycol 0.4%- propylene glycol 0.3% 0.4-0.3 % Soln ophthalmic solution    SYSTANE ULTRA     Place 1 drop into both eyes At Bedtime        PROBIOTIC PO      Take by mouth daily        simvastatin 20 MG tablet    ZOCOR    90 tablet    TAKE 1 TABLET (20 MG) BY MOUTH AT BEDTIME    Hyperlipidemia LDL goal <130       tolterodine 2 MG tablet    DETROL    180 tablet    Take 1 tablet (2 mg) by mouth 2 times daily    Mixed stress and urge urinary incontinence       valACYclovir 500 MG tablet    VALTREX    36 tablet    TAKE 1 TABLET (500 MG) BY MOUTH 2 TIMES DAILY    Herpes simplex vulvovaginitis       VITAMIN D3 PO      Take by mouth daily

## 2017-10-17 NOTE — PROGRESS NOTES
SUBJECTIVE:   Hiro Carranza is a 72 year old female who presents to clinic today for the following health issues:    Hyperlipidemia Follow-Up      Rate your low fat/cholesterol diet?: good    Taking statin?  No    Other lipid medications/supplements?:  none    Hypertension Follow-up      Outpatient blood pressures are not being checked.    Low Salt Diet: low salt    Amount of exercise or physical activity: lately has been reduced    Problems taking medications regularly: No but sometimes Statin taking at bedtime    Medication side effects: none    Diet: regular (no restrictions)    Mother  on 3/26/17-  She had lung cancer for a couple yrs, had gotten more and more tired.  Pt had been seeing her in nursing home most days.  Notes it was a quick, fairly easy death.  Had the service in .  Now busy clearing out things, and dealing with even an easy estate is tough.  Sister Celeste is her best friend, so that is good.  Recently went through photos- way back to great, great, grandmother.  Mostly good, time for tears and joys.      [facility]- was really helpful, but has fallen off, needs to get to doing them.  Fewer aches when she's doing it and moving around more.    Medication questions- sometimes forgets the night-time medication.  Can switch to take with evening meds.    GERD  Went down to one tab, for a couple months, but sx's returned, so went back to 2 tabs/day.  Has been mostly symptom-free for ~3 months.  Has been careful with evening diet.  Sometimes takes tums.      Probiotic use- for gas, not sure it's all that helpful.  Does use gas-x- somewhat helpful.      Problem list and histories reviewed & adjusted, as indicated.  Additional history: as documented    Patient Active Problem List   Diagnosis     Stallings esophagus     Mixed stress and urge urinary incontinence     Pain in shoulder     Osteopenia     Onychogryposis and onychomycosis     Hyperlipidemia LDL goal <130      Other viral warts     Genital herpes     Advance Care Planning     Gastroesophageal reflux disease with esophagitis     Other dental procedure status     Arthritis of knee, right     Acute posthemorrhagic anemia     Physical deconditioning     Constipation     MARK (obstructive sleep apnea)(Mild AHI=5)     Calculus of gallbladder without cholecystitis without obstruction     Cholelithiases     Shortened [place] interval      Current Outpatient Prescriptions   Medication Sig Dispense Refill     omeprazole (PRILOSEC) 20 MG CR capsule Take 1 capsule (20 mg) by mouth 2 times daily 180 capsule 1     hydrochlorothiazide (HYDRODIURIL) 25 MG tablet TAKE 1 TABLET (25 MG) BY MOUTH DAILY 90 tablet 1     tolterodine (DETROL) 2 MG tablet Take 1 tablet (2 mg) by mouth 2 times daily 180 tablet 1     clotrimazole (LOTRIMIN) 1 % cream APPLY TOPICALLY 2 TIMES DAILY 60 g 0     simvastatin (ZOCOR) 20 MG tablet TAKE 1 TABLET (20 MG) BY MOUTH AT BEDTIME 90 tablet 3     valACYclovir (VALTREX) 500 MG tablet TAKE 1 TABLET (500 MG) BY MOUTH 2 TIMES DAILY 36 tablet 1     Probiotic Product (PROBIOTIC PO) Take by mouth daily       Cholecalciferol (VITAMIN D3 PO) Take by mouth daily       GLUCOSAMINE-CHONDROITIN PO        polyethylene glycol 0.4%- propylene glycol 0.3% (SYSTANE ULTRA) 0.4-0.3 % SOLN ophthalmic solution Place 1 drop into both eyes At Bedtime       DAILY MULTIVITAMIN PO DAILY       FISH  MG OR CAPS 2 tablets daily       Allergies   Allergen Reactions     No Known Drug Allergies      Recent Labs   Lab Test  03/01/17   1226  07/13/16   1128  06/28/16   1454 06/21/16   12/18/15   1309   12/03/14   1320   LDL  97   --    --    --    --   85   --   93   HDL  65   --    --    --    --   75   --   69   TRIG  107   --    --    --    --   65   --   90   ALT  19   --   26  37   --   21   < >   --    CR  0.69  0.72  0.76  0.82   < >  0.66   < >  0.71   GFRESTIMATED  83  80  75  >60   < >  89   < >  81   GFRESTBLACK  >90    "GFR Calc    >90   GFR Calc    >90   GFR Calc     --    < >  >90   GFR Calc     < >  >90   GFR Calc     POTASSIUM  4.7  4.1  4.2  3.3   < >  4.1   < >  4.3   TSH  1.13   --    --    --    --    --    --    --     < > = values in this interval not displayed.      BP Readings from Last 3 Encounters:   10/17/17 124/74   03/01/17 130/78   09/28/16 126/80    Wt Readings from Last 3 Encounters:   10/17/17 280 lb 8 oz (127.2 kg)   03/01/17 273 lb 3.2 oz (123.9 kg)   09/28/16 274 lb 6.4 oz (124.5 kg)            Labs reviewed in EPIC      Reviewed and updated as needed this visit by clinical staffTobacco  Allergies  Meds  Problems       Reviewed and updated as needed this visit by Provider  Allergies  Meds  Problems          ROS:  Constitutional, HEENT, cardiovascular, pulmonary, gi and gu systems are negative, except as otherwise noted.      OBJECTIVE:   /74  Pulse 89  Temp 97.3  F (36.3  C) (Oral)  Ht 5' 9\" (1.753 m)  Wt 280 lb 8 oz (127.2 kg)  SpO2 98%  Breastfeeding? No  BMI 41.42 kg/m2  Body mass index is 41.42 kg/(m^2).  GENERAL APPEARANCE: healthy, alert and no distress     EYES: PERRL, sclera clear     HENT: nose and mouth without ulcers or lesions     NECK: no adenopathy, no asymmetry, masses, or scars and thyroid normal to palpation     RESP: lungs clear to auscultation - no rales, rhonchi or wheezes     CV: regular rates and rhythm, normal S1 S2, no S3 or S4 and no murmur, click or rub      Abdomen: soft, nontender, no HSM or masses and bowel sounds normal     Ext: warm, dry, 1-2+ edema in bilateral lower legs      ASSESSMENT/PLAN:       ICD-10-CM    1. 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 Generalized edema R60.1 hydrochlorothiazide (HYDRODIURIL) 25 MG tablet   3. Mixed stress and urge urinary incontinence N39.46 tolterodine (DETROL) 2 MG tablet   4. Intertriginous candidiasis " B37.2 clotrimazole (LOTRIMIN) 1 % cream   5. Hyperlipidemia LDL goal <130 E78.5    6. Need for prophylactic vaccination and inoculation against influenza Z23 FLU VACCINE, INCREASED ANTIGEN, PRESV FREE, AGE 65+ [44523]     Vaccine Administration, Initial [97372]     --Stable chronic issues.  Social issues- still working on getting estate things finalized with mother's death in 3/17.  Good death for her mother, though- her mother was ready, and she and her family was well supported.  Pt now working on getting back into exercise and balance exercises- had gotten off them with mother's death.  --GERD/Barretts- reviewed Dr. Aguilera's recs, so will cont on BID PPI due to Dr. Aguilera's recs and sx return the last time she tried QD dosing.  --Cont on other current meds.  Detrol helpful, and notices when she misses a dose.  --Refills sent x 6 months.  --Labs next visit- should come fasting.  --Flu vaccine- Risks/benefits discussed, given today.     Marie Robledo MD  [facility]

## 2017-11-17 ENCOUNTER — TELEPHONE (OUTPATIENT)
Dept: FAMILY MEDICINE | Facility: CLINIC | Age: 72
End: 2017-11-17

## 2017-11-17 NOTE — TELEPHONE ENCOUNTER
CW,  Patient states she has seen advertisements for [facility] Screening.  Wants to know what she thinks about this.  States it screens for:  Carotid Artery Disease Screening   Abdominal Aortic Aneurysm Screening   Atrial Fibrillation Screening   Peripheral Arterial Disease Screening   Osteoporosis Screening/Bone Density Test   High Cholesterol Screening/Lipid Panel Test   Elevated Liver Enzymes Screening   Chronic Kidney Disease (CKD) Screening   Type 2 Diabetes Screening Health Risk Assessment - 6 for Life   C-Reactive Protein Screening   Thyroid Disease Screening   Vitamin D Screening   Prostate Cancer Screening   Colorectal Cancer Screening   Testosterone Deficiency Screening    Here is their website if you want more info: [email or web address]-We-[email or web address]-[email or web address]  Please advise.  Bernadette MCCULLOUGH RN

## 2017-11-17 NOTE — TELEPHONE ENCOUNTER
Reason for Call:  Life Line Screening    Detailed comments: Pt calling because she is thinking of getting a Life Line Screening,   She would like to see what Dr. Robledo thinks about this.   She wants to know if Dr. Robledo thinks this would be useful     Phone Number Patient can be reached at: Home number on file [phone] (home)    Best Time: anytime    Can we leave a detailed message on this number? YES    Call taken on 11/17/2017 at 1:22 PM by Renita Holden

## 2017-11-21 NOTE — TELEPHONE ENCOUNTER
I have conflicting feelings about this type of screening- easier to explain at an office visit. Some information can end up being helpful, some doesn't hurt or help, and some can cause additional issues because we need to do additional work-up or treatments that may not end up being helpful.  If it's not too expensive for her, and she'd like to do it, though, I'm fine with it.  Thanks- CW

## 2017-11-27 ENCOUNTER — TELEPHONE (OUTPATIENT)
Dept: FAMILY MEDICINE | Facility: CLINIC | Age: 72
End: 2017-11-27

## 2017-11-27 NOTE — TELEPHONE ENCOUNTER
Last colonoscopy 2/22/2008  Due for colonoscopy February 2018  Told pt  staff may have been looking ahead, but yes, not quite due for this yet.  Pt would like to discuss this at physical with CW (due 3/2/2018 or later)  Bernadette MCCULLOUGH RN

## 2017-11-27 NOTE — TELEPHONE ENCOUNTER
Reason for Call: call back    Detailed comments: Pt states that she got a mailing stating that she was due for her colonoscopy. She says that this doesn't seem right, and she was hoping someone would be able to advise her if they knew when it was time for her to start scheduling this. Please call to advise.     Phone Number Patient can be reached at: Cell number on file:    Telephone Information:   Mobile [phone]       Best Time: any    Can we leave a detailed message on this number? YES    Call taken on 11/27/2017 at 1:17 PM by Merle Sauer

## 2018-04-11 DIAGNOSIS — E78.5 HYPERLIPIDEMIA LDL GOAL <130: ICD-10-CM

## 2018-04-11 NOTE — TELEPHONE ENCOUNTER
"Requested Prescriptions   Pending Prescriptions Disp Refills     simvastatin (ZOCOR) 20 MG tablet [Pharmacy Med Name: Simvastatin Oral Tablet 20 MG] 90 tablet 2     Sig: TAKE ONE TABLET BY MOUTH AT BEDTIME    Statins Protocol Failed    4/11/2018  3:22 PM       Failed - LDL on file in past 12 months    Recent Labs   Lab Test  03/01/17   1226   LDL  97            Passed - No abnormal creatine kinase in past 12 months    No lab results found.            Passed - Recent (12 mo) or future (30 days) visit within the authorizing provider's specialty    Patient had office visit in the last 12 months or has a visit in the next 30 days with authorizing provider or within the authorizing provider's specialty.  See \"Patient Info\" tab in inbasket, or \"Choose Columns\" in Meds & Orders section of the refill encounter.           Passed - Patient is age 18 or older       Passed - No active pregnancy on record       Passed - No positive pregnancy test in past 12 months        "

## 2018-04-12 RX ORDER — SIMVASTATIN 20 MG
TABLET ORAL
Qty: 30 TABLET | Refills: 0 | Status: SHIPPED | OUTPATIENT
Start: 2018-04-12 | End: 2018-05-01

## 2018-04-12 NOTE — TELEPHONE ENCOUNTER
Medication is being filled for 1 time refill only due to:  Patient needs to be seen because it has been more than one year since last visit.   Due for physical and fasting labs.  Last 3/1/17  Aarti Black RN

## 2018-04-21 DIAGNOSIS — K22.70 BARRETT'S ESOPHAGUS WITHOUT DYSPLASIA: ICD-10-CM

## 2018-04-21 DIAGNOSIS — R60.1 GENERALIZED EDEMA: ICD-10-CM

## 2018-04-21 DIAGNOSIS — N39.46 MIXED STRESS AND URGE URINARY INCONTINENCE: ICD-10-CM

## 2018-04-21 DIAGNOSIS — A60.04 HERPES SIMPLEX VULVOVAGINITIS: ICD-10-CM

## 2018-04-23 ENCOUNTER — TELEPHONE (OUTPATIENT)
Dept: FAMILY MEDICINE | Facility: CLINIC | Age: 73
End: 2018-04-23

## 2018-04-23 RX ORDER — HYDROCHLOROTHIAZIDE 25 MG/1
TABLET ORAL
Qty: 30 TABLET | Refills: 0 | Status: SHIPPED | OUTPATIENT
Start: 2018-04-23 | End: 2018-05-01

## 2018-04-23 RX ORDER — TOLTERODINE TARTRATE 2 MG/1
TABLET, EXTENDED RELEASE ORAL
Qty: 60 TABLET | Refills: 0 | Status: SHIPPED | OUTPATIENT
Start: 2018-04-23 | End: 2018-05-01

## 2018-04-23 RX ORDER — VALACYCLOVIR HYDROCHLORIDE 500 MG/1
TABLET, FILM COATED ORAL
Qty: 36 TABLET | Refills: 0 | Status: SHIPPED | OUTPATIENT
Start: 2018-04-23 | End: 2018-08-07

## 2018-04-23 NOTE — TELEPHONE ENCOUNTER
"Medication is being filled for 1 time refill only due to:  Patient needs to be seen because due for 6 month f/u and yearly labs.   Bernadette MCCULLOUGH RN    Requested Prescriptions   Pending Prescriptions Disp Refills     valACYclovir (VALTREX) 500 MG tablet [Pharmacy Med Name: ValACYclovir HCl Oral Tablet 500 MG] 36 tablet 0     Sig: TAKE ONE TABLET BY MOUTH TWICE DAILY    Antivirals for Herpes Protocol Failed    4/21/2018  3:50 PM       Failed - Normal serum creatinine on file in past 12 months    Recent Labs   Lab Test  03/01/17   1226   CR  0.69            Passed - Patient is age 12 or older       Passed - Recent (12 mo) or future (30 days) visit within the authorizing provider's specialty    Patient had office visit in the last 12 months or has a visit in the next 30 days with authorizing provider or within the authorizing provider's specialty.  See \"Patient Info\" tab in in[employer], or \"Choose Columns\" in Meds & Orders section of the refill encounter.            omeprazole (PRILOSEC) 20 MG CR capsule [Pharmacy Med Name: Omeprazole Oral Capsule Delayed Release 20 MG] 180 capsule 0     Sig: TAKE ONE CAPSULE BY MOUTH TWICE DAILY    PPI Protocol Passed    4/21/2018  3:50 PM       Passed - Not on Clopidogrel (unless Pantoprazole ordered)       Passed - No diagnosis of osteoporosis on record       Passed - Recent (12 mo) or future (30 days) visit within the authorizing provider's specialty    Patient had office visit in the last 12 months or has a visit in the next 30 days with authorizing provider or within the authorizing provider's specialty.  See \"Patient Info\" tab in [employer], or \"Choose Columns\" in Meds & Orders section of the refill encounter.           Passed - Patient is age 18 or older       Passed - No active pregnacy on record       Passed - No positive pregnancy test in past 12 months        hydrochlorothiazide (HYDRODIURIL) 25 MG tablet [Pharmacy Med Name: HydroCHLOROthiazide Oral Tablet 25 MG] 90 tablet 0     " "Sig: TAKE ONE TABLET BY MOUTH ONCE DAILY    Diuretics (Including Combos) Protocol Failed    4/21/2018  3:50 PM       Failed - Normal serum creatinine on file in past 12 months    Recent Labs   Lab Test  03/01/17   1226   CR  0.69             Failed - Normal serum potassium on file in past 12 months    Recent Labs   Lab Test  03/01/17   1226   POTASSIUM  4.7                   Failed - Normal serum sodium on file in past 12 months    Recent Labs   Lab Test  03/01/17   1226   NA  142             Passed - Blood pressure under 140/90 in past 12 months    BP Readings from Last 3 Encounters:   10/17/17 124/74   03/01/17 130/78   09/28/16 126/80                Passed - Recent (12 mo) or future (30 days) visit within the authorizing provider's specialty    Patient had office visit in the last 12 months or has a visit in the next 30 days with authorizing provider or within the authorizing provider's specialty.  See \"Patient Info\" tab in inbasket, or \"Choose Columns\" in Meds & Orders section of the refill encounter.           Passed - Patient is age 18 or older       Passed - No active pregancy on record       Passed - No positive pregnancy test in past 12 months        tolterodine (DETROL) 2 MG tablet [Pharmacy Med Name: Tolterodine Tartrate Oral Tablet 2 MG] 180 tablet 0     Sig: TAKE ONE TABLET BY MOUTH TWICE DAILY    Muscarinic Antagonists (Urinary Incontinence Agents) Passed    4/21/2018  3:50 PM       Passed - Recent (12 mo) or future (30 days) visit within the authorizing provider's specialty    Patient had office visit in the last 12 months or has a visit in the next 30 days with authorizing provider or within the authorizing provider's specialty.  See \"Patient Info\" tab in inepicurioet, or \"Choose Columns\" in Meds & Orders section of the refill encounter.           Passed - Patient does not have a diagnosis of glaucoma on the problem list    If glaucoma diagnosis is new, refer refill to physician.         Passed - Patient " is 18 years of age or older

## 2018-04-28 ENCOUNTER — HEALTH MAINTENANCE LETTER (OUTPATIENT)
Age: 73
End: 2018-04-28

## 2018-05-01 ENCOUNTER — OFFICE VISIT (OUTPATIENT)
Dept: FAMILY MEDICINE | Facility: CLINIC | Age: 73
End: 2018-05-01
Payer: COMMERCIAL

## 2018-05-01 VITALS
BODY MASS INDEX: 41.75 KG/M2 | TEMPERATURE: 97.6 F | OXYGEN SATURATION: 95 % | HEIGHT: 69 IN | WEIGHT: 281.9 LBS | DIASTOLIC BLOOD PRESSURE: 92 MMHG | HEART RATE: 88 BPM | SYSTOLIC BLOOD PRESSURE: 143 MMHG

## 2018-05-01 DIAGNOSIS — R60.1 GENERALIZED EDEMA: ICD-10-CM

## 2018-05-01 DIAGNOSIS — K22.70 BARRETT'S ESOPHAGUS WITHOUT DYSPLASIA: ICD-10-CM

## 2018-05-01 DIAGNOSIS — Z23 NEED FOR TDAP VACCINATION: ICD-10-CM

## 2018-05-01 DIAGNOSIS — K21.00 GASTROESOPHAGEAL REFLUX DISEASE WITH ESOPHAGITIS: ICD-10-CM

## 2018-05-01 DIAGNOSIS — E78.5 HYPERLIPIDEMIA LDL GOAL <130: ICD-10-CM

## 2018-05-01 DIAGNOSIS — Z00.00 ENCOUNTER FOR ROUTINE ADULT HEALTH EXAMINATION WITHOUT ABNORMAL FINDINGS: Primary | ICD-10-CM

## 2018-05-01 DIAGNOSIS — G47.33 OSA (OBSTRUCTIVE SLEEP APNEA): ICD-10-CM

## 2018-05-01 DIAGNOSIS — A60.04 HERPES SIMPLEX VULVOVAGINITIS: ICD-10-CM

## 2018-05-01 DIAGNOSIS — N39.46 MIXED STRESS AND URGE URINARY INCONTINENCE: ICD-10-CM

## 2018-05-01 LAB
CREAT UR-MCNC: 25 MG/DL
MICROALBUMIN UR-MCNC: <5 MG/L
MICROALBUMIN/CREAT UR: NORMAL MG/G CR (ref 0–25)

## 2018-05-01 PROCEDURE — G0439 PPPS, SUBSEQ VISIT: HCPCS | Mod: 25 | Performed by: FAMILY MEDICINE

## 2018-05-01 PROCEDURE — 36415 COLL VENOUS BLD VENIPUNCTURE: CPT | Performed by: FAMILY MEDICINE

## 2018-05-01 PROCEDURE — 82043 UR ALBUMIN QUANTITATIVE: CPT | Performed by: FAMILY MEDICINE

## 2018-05-01 PROCEDURE — 90471 IMMUNIZATION ADMIN: CPT | Performed by: FAMILY MEDICINE

## 2018-05-01 PROCEDURE — 80061 LIPID PANEL: CPT | Performed by: FAMILY MEDICINE

## 2018-05-01 PROCEDURE — 90715 TDAP VACCINE 7 YRS/> IM: CPT | Performed by: FAMILY MEDICINE

## 2018-05-01 PROCEDURE — 80048 BASIC METABOLIC PNL TOTAL CA: CPT | Performed by: FAMILY MEDICINE

## 2018-05-01 RX ORDER — HYDROCHLOROTHIAZIDE 25 MG/1
25 TABLET ORAL DAILY
Qty: 90 TABLET | Refills: 1 | Status: SHIPPED | OUTPATIENT
Start: 2018-05-01 | End: 2018-11-02

## 2018-05-01 RX ORDER — TOLTERODINE TARTRATE 2 MG/1
2 TABLET, EXTENDED RELEASE ORAL 2 TIMES DAILY
Qty: 180 TABLET | Refills: 1 | Status: SHIPPED | OUTPATIENT
Start: 2018-05-01 | End: 2018-11-02

## 2018-05-01 RX ORDER — CLOTRIMAZOLE 1 %
CREAM (GRAM) TOPICAL
Qty: 60 G | Refills: 0 | Status: CANCELLED | OUTPATIENT
Start: 2018-05-01

## 2018-05-01 RX ORDER — SIMVASTATIN 20 MG
TABLET ORAL
Qty: 90 TABLET | Refills: 1 | Status: SHIPPED | OUTPATIENT
Start: 2018-05-01 | End: 2018-11-02

## 2018-05-01 RX ORDER — VALACYCLOVIR HYDROCHLORIDE 500 MG/1
500 TABLET, FILM COATED ORAL 2 TIMES DAILY
Qty: 36 TABLET | Refills: 3 | Status: SHIPPED | OUTPATIENT
Start: 2018-05-01 | End: 2019-05-30

## 2018-05-01 ASSESSMENT — ACTIVITIES OF DAILY LIVING (ADL)
I_NEED_ASSISTANCE_FOR_THE_FOLLOWING_DAILY_ACTIVITIES:: NO ASSISTANCE IS NEEDED
CURRENT_FUNCTION: NO ASSISTANCE NEEDED

## 2018-05-01 NOTE — NURSING NOTE
"Chief Complaint   Patient presents with     Wellness Visit     fasting     BP (!) 143/92  Pulse 88  Temp 97.6  F (36.4  C) (Oral)  Ht 5' 9\" (1.753 m)  Wt 281 lb 14.4 oz (127.9 kg)  SpO2 95%  Breastfeeding? No  BMI 41.63 kg/m2 Estimated body mass index is 41.63 kg/(m^2) as calculated from the following:    Height as of this encounter: 5' 9\" (1.753 m).    Weight as of this encounter: 281 lb 14.4 oz (127.9 kg).  BP completed using cuff size: large       Health Maintenance due pending provider review:  Mammogram and Colonoscopy/FIT    Mammo -will schedule soon.  Colonoscopy- scheduled 5/8/2018    Amisha Dow MA  "

## 2018-05-01 NOTE — PATIENT INSTRUCTIONS

## 2018-05-01 NOTE — LETTER
May 21, 2018      Hiro Carranza  [address] [place] [id number]        Dear ,    We are writing to inform you of your test results.    Here are your lab results from your recent visit...  -Your cholesterol panel looks very good with a low LDL (the bad cholesterol) and a high HDL (the good cholesterol).   -Your microalbumin level (which is the urine test that can signal signs of early chronic kidney disease if elevated to >30) looks very low which is good.  -Your basic metabolic panel (which includes electrolyte levels, blood sugar level and kidney function tests) is normal.    Resulted Orders   Lipid panel reflex to direct LDL Fasting   Result Value Ref Range    Cholesterol 173 <200 mg/dL    Triglycerides 100 <150 mg/dL    HDL Cholesterol 65 >49 mg/dL    LDL Cholesterol Calculated 88 <100 mg/dL      Comment:      Desirable:       <100 mg/dl    Non HDL Cholesterol 108 <130 mg/dL   Basic metabolic panel  (Ca, Cl, CO2, Creat, Gluc, K, Na, BUN)   Result Value Ref Range    Sodium 139 133 - 144 mmol/L    Potassium 3.6 3.4 - 5.3 mmol/L    Chloride 103 94 - 109 mmol/L    Carbon Dioxide 25 20 - 32 mmol/L    Anion Gap 11 3 - 14 mmol/L    Glucose 76 70 - 99 mg/dL    Urea Nitrogen 13 7 - 30 mg/dL    Creatinine 0.71 0.52 - 1.04 mg/dL    GFR Estimate 81 >60 mL/min/1.7m2      Comment:      Non  GFR Calc    GFR Estimate If Black >90 >60 mL/min/1.7m2      Comment:       GFR Calc    Calcium 9.4 8.5 - 10.1 mg/dL   Albumin Random Urine Quantitative with Creat Ratio   Result Value Ref Range    Creatinine Urine 25 mg/dL    Albumin Urine mg/L <5 mg/L    Albumin Urine mg/g Cr Unable to calculate due to low value 0 - 25 mg/g Cr       If you have any questions or concerns, please call the clinic at the number listed above.       Sincerely,        Marie Robledo MD

## 2018-05-01 NOTE — PROGRESS NOTES
SUBJECTIVE:   Hiro Carranza is a 72 year old female who presents for Preventive Visit.  Are you in the first 12 months of your Medicare coverage?  No    Physical   Annual:     Getting at least 3 servings of Calcium per day::  NO    Bi-annual eye exam::  Yes    Dental care twice a year::  NO    Sleep apnea or symptoms of sleep apnea::  None    Diet::  Regular (no restrictions)    Taking medications regularly::  Yes    Medication side effects::  None    Additional concerns today::  YES    Ability to successfully perform activities of daily living: no assistance needed  Home Safety:  Throw rugs in the hallway and lack of grab bars in the bathroom  Hearing Impairment: no hearing concerns    Fall risk:  Fallen 2 or more times in the past year?: No  Any fall with injury in the past year?: No  COGNITIVE SCREEN  1) Repeat 3 items (Banana, Sunrise, Chair)    2) Clock draw: NORMAL  3) 3 item recall: Recalls 3 objects  Results: 3 items recalled: COGNITIVE IMPAIRMENT LESS LIKELY    Mini-CogTM Copyright RALPH Ramsay. Licensed by the author for use in [facility]; reprinted with permission (triny@[email or web address]). All rights reserved.      Immunizations-   --Has had both pneumonia vaccines- 23 was just given the month prior to turning 65 yrs so it is still 'flagging' on Health Maintenance.  --She is due for Tdap.  --She got shingrix in the past month at [facility], will do the second one there as well.    --Due for colonoscopy- scheduled soon for that.  --Due for endoscopy- will refer back to Dr. Aguilera.  GERD/Stallings- taking PPI twice daily per Dr. Aguilera's recs.    Due for f/u endoscopy soon- will refer back.      Med refills-   Valtrex- taking as needed.  HTN- HCTZ 25mg/d.  Lipids - zocor 20mg/d.    Mild MARK- CPAP not recommended.    Leg edema- started HCTZ in 6/15, increased to 25mg/d in 5/16.  Swelling improved on lower dose, resolved mostly on higher dose.  No edema.          Reviewed and updated as needed this visit by clinical  staff  Tobacco  Allergies  Meds         Reviewed and updated as needed this visit by Provider        Social History   Substance Use Topics     Smoking status: Former Smoker     Quit date: 1/1/1985     Smokeless tobacco: Never Used      Comment: 20 yrs smoking '65-'85, 1 PPD     Alcohol use 0.0 oz/week     0 Standard drinks or equivalent per week      Comment: Occasional 2 beers weekly. 1-2 glasses wime monthly       Alcohol Use 5/1/2018   If you drink alcohol do you typically have greater than 3 drinks per day OR greater than 7 drinks per week? No       Today's PHQ-2 Score:   PHQ-2 ( 1999 Pfizer) 5/1/2018   Q1: Little interest or pleasure in doing things 0   Q2: Feeling down, depressed or hopeless 0   PHQ-2 Score 0   Q1: Little interest or pleasure in doing things Not at all   Q2: Feeling down, depressed or hopeless Not at all   PHQ-2 Score 0     Do you feel safe in your environment - Yes    Do you have a Health Care Directive?: Yes: Advance Directive has been received and scanned.    Current providers sharing in care for this patient include:   Patient Care Team:  Marie Robledo MD as PCP - General    The following health maintenance items are reviewed in Epic and correct as of today:  Health Maintenance   Topic Date Due     PNEUMOCOCCAL (2 of 2 - PPSV23) 07/01/2016     TETANUS IMMUNIZATION (SYSTEM ASSIGNED)  01/23/2018     MAMMO SCREEN Q2 YR (SYSTEM ASSIGNED)  02/04/2018     COLONOSCOPY Q10 YR  02/22/2018     FALL RISK ASSESSMENT  03/01/2018     ADVANCE DIRECTIVE PLANNING Q5 YRS  12/18/2020     LIPID SCREEN Q5 YR FEMALE (SYSTEM ASSIGNED)  03/01/2022     DEXA SCAN SCREENING (SYSTEM ASSIGNED)  Completed     INFLUENZA VACCINE  Completed     HEPATITIS C SCREENING  Completed     Labs reviewed in EPIC  BP Readings from Last 3 Encounters:   05/01/18 (!) 143/92   10/17/17 124/74   03/01/17 130/78    Wt Readings from Last 3 Encounters:   05/01/18 281 lb 14.4 oz (127.9 kg)   10/17/17 280 lb 8 oz (127.2 kg)    03/01/17 273 lb 3.2 oz (123.9 kg)                  Patient Active Problem List   Diagnosis     Stallings esophagus     Mixed stress and urge urinary incontinence     Pain in shoulder     Osteopenia     Onychogryposis and onychomycosis     Hyperlipidemia LDL goal <130     Other viral warts     Genital herpes     Advance Care Planning     Gastroesophageal reflux disease with esophagitis     Other dental procedure status     Arthritis of knee, right     Acute posthemorrhagic anemia     Physical deconditioning     Constipation     MARK (obstructive sleep apnea)(Mild AHI=5)     Calculus of gallbladder without cholecystitis without obstruction     Cholelithiases     Shortened NH interval     Past Surgical History:   Procedure Laterality Date     ARTHROPLASTY KNEE Right 6/10/2015    Procedure: ARTHROPLASTY KNEE;  Surgeon: Jorge Luis Snyder MD;  Location:  OR     C NONSPECIFIC PROCEDURE  2004    ovarian cyst drainage, resection of fibroid tumors     C NONSPECIFIC PROCEDURE  2005    L cataract     C NONSPECIFIC PROCEDURE  6/08    L knee replacement     C RAD RESEC TONSIL/PILLARS  1953     C SHOULDER SURG PROC UNLISTED  2009    lt shoulder arthritis- replacement     C TOTAL KNEE ARTHROPLASTY      left knee total 08     ESOPHAGOSCOPY, GASTROSCOPY, DUODENOSCOPY (EGD), COMBINED N/A 5/21/2015    Procedure: COMBINED ESOPHAGOSCOPY, GASTROSCOPY, DUODENOSCOPY (EGD), BIOPSY SINGLE OR MULTIPLE;  Surgeon: Venkatesh Aguilera MD;  Location:  GI     LAPAROSCOPIC CHOLECYSTECTOMY N/A 8/4/2016    Procedure: LAPAROSCOPIC CHOLECYSTECTOMY;  Surgeon: Venkatesh Ford MD;  Location:  OR     SURGICAL HISTORY OF -   2008    R cataract       Social History   Substance Use Topics     Smoking status: Former Smoker     Quit date: 1/1/1985     Smokeless tobacco: Never Used      Comment: 20 yrs smoking '65-'85, 1 PPD     Alcohol use 0.0 oz/week     0 Standard drinks or equivalent per week      Comment: Occasional 2 beers weekly. 1-2 glasses  wime monthly     Family History   Problem Relation Age of Onset     C.A.D. Father      triple bypass in his late 60's     C.A.D. Maternal Grandfather      death from MI in early 60s     C.A.D. Paternal Grandfather      MI in early 70s     Hypertension Mother      CEREBROVASCULAR DISEASE Maternal Grandmother      Arthritis Mother      Circulatory Father      Lipids Mother      Lipids Father      Musculoskeletal Disorder Mother      double knee replacement     Obesity Mother      Obesity Father      Obesity Maternal Grandmother      Obesity Maternal Grandfather      Obesity Paternal Grandmother      Obesity Paternal Grandfather          Current Outpatient Prescriptions   Medication Sig Dispense Refill     Cholecalciferol (VITAMIN D3 PO) Take by mouth daily       DAILY MULTIVITAMIN PO DAILY       FISH  MG OR CAPS 2 tablets daily       GLUCOSAMINE-CHONDROITIN PO        hydrochlorothiazide (HYDRODIURIL) 25 MG tablet Take 1 tablet (25 mg) by mouth daily 90 tablet 1     omeprazole (PRILOSEC) 20 MG CR capsule Take 1 capsule (20 mg) by mouth 2 times daily 180 capsule 1     polyethylene glycol 0.4%- propylene glycol 0.3% (SYSTANE ULTRA) 0.4-0.3 % SOLN ophthalmic solution Place 1 drop into both eyes At Bedtime       simvastatin (ZOCOR) 20 MG tablet TAKE ONE TABLET BY MOUTH AT BEDTIME 90 tablet 1     tolterodine (DETROL) 2 MG tablet Take 1 tablet (2 mg) by mouth 2 times daily 180 tablet 1     valACYclovir (VALTREX) 500 MG tablet TAKE ONE TABLET BY MOUTH TWICE DAILY  (Patient taking differently: PRN) 36 tablet 0     valACYclovir (VALTREX) 500 MG tablet Take 1 tablet (500 mg) by mouth 2 times daily For 3 days total 36 tablet 3     clotrimazole (LOTRIMIN) 1 % cream APPLY TOPICALLY 2 TIMES DAILY 60 g 0     Probiotic Product (PROBIOTIC PO) Take by mouth daily       Allergies   Allergen Reactions     No Known Drug Allergies      Recent Labs   Lab Test  05/01/18   1025  03/01/17   1226   06/28/16   1454 06/21/16   12/18/15    "1309   LDL  88  97   --    --    --    --   85   HDL  65  65   --    --    --    --   75   TRIG  100  107   --    --    --    --   65   ALT   --   19   --   26  37   --   21   CR  0.71  0.69   < >  0.76  0.82   < >  0.66   GFRESTIMATED  81  83   < >  75  >60   < >  89   GFRESTBLACK  >90  >90  African American GFR Calc     < >  >90   GFR Calc     --    < >  >90   GFR Calc     POTASSIUM  3.6  4.7   < >  4.2  3.3   < >  4.1   TSH   --   1.13   --    --    --    --    --     < > = values in this interval not displayed.          Review of Systems  Constitutional, HEENT, cardiovascular, pulmonary, gi and gu systems are negative, except as otherwise noted.    OBJECTIVE:   BP (!) 143/92  Pulse 88  Temp 97.6  F (36.4  C) (Oral)  Ht 5' 9\" (1.753 m)  Wt 281 lb 14.4 oz (127.9 kg)  SpO2 95%  Breastfeeding? No  BMI 41.63 kg/m2 Estimated body mass index is 41.63 kg/(m^2) as calculated from the following:    Height as of this encounter: 5' 9\" (1.753 m).    Weight as of this encounter: 281 lb 14.4 oz (127.9 kg).  Physical Exam  GENERAL APPEARANCE: healthy, alert and no distress  EYES: Eyes grossly normal to inspection, PERRL and conjunctivae and sclerae normal  HENT: ear canals and TM's normal, nose and mouth without ulcers or lesions, oropharynx clear and oral mucous membranes moist  NECK: no adenopathy, no asymmetry, masses, or scars and thyroid normal to palpation  RESP: lungs clear to auscultation - no rales, rhonchi or wheezes  BREAST: normal without masses, tenderness or nipple discharge and no palpable axillary masses or adenopathy  CV: regular rate and rhythm, normal S1 S2, no S3 or S4, no murmur, click or rub, no peripheral edema and peripheral pulses strong  ABDOMEN: soft, nontender, no hepatosplenomegaly, no masses and bowel sounds normal  MS: no musculoskeletal defects are noted and gait is age appropriate without ataxia  SKIN: no suspicious lesions or rashes  NEURO: Normal strength " and tone, sensory exam grossly normal, mentation intact and speech normal  PSYCH: mentation appears normal and affect normal/bright      ASSESSMENT / PLAN:       ICD-10-CM    1. Encounter for routine adult health examination without abnormal findings Z00.00    2. Need for Tdap vaccination Z23 TDAP VACCINE (ADACEL)   3. 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 JOSH Aguilera     omeprazole (PRILOSEC) 20 MG CR capsule   4. Gastroesophageal reflux disease with esophagitis K21.0    5. Herpes simplex vulvovaginitis A60.04 valACYclovir (VALTREX) 500 MG tablet   6. MARK (obstructive sleep apnea)(Mild AHI=5) G47.33    7. Hyperlipidemia LDL goal <130 E78.5 Lipid panel reflex to direct LDL Fasting     simvastatin (ZOCOR) 20 MG tablet   8. Generalized edema R60.1 Basic metabolic panel  (Ca, Cl, CO2, Creat, Gluc, K, Na, BUN)     Albumin Random Urine Quantitative with Creat Ratio     hydrochlorothiazide (HYDRODIURIL) 25 MG tablet   9. Mixed stress and urge urinary incontinence N39.46 tolterodine (DETROL) 2 MG tablet   10. Intertriginous candidiasis B37.2 clotrimazole (LOTRIMIN) 1 % cream     CPE- Discussed diet, calcium and exercise.  Went over Self Breast Exam.  Thin prep pap was not done.  Eyes and Teeth or UTD or recommended f/u.  Tdap immunizations needed today.  She did her first shingrix at [facility].  See orders below for tests ordered and screening needed.       Will do Tdap today.  Risks/benefits discussed, given today.   Cont shingrix vaccines at pharmacy- first one done this past month.  Has colonoscopy scheduled next week.  Referred back to Dr. Aguilera for EGD to f/u Stallings's/GERD.  Currently on BID PPI.    **Cont current meds for herpes, high lipids, leg edema (likely mild HTN, on HCTZ for edema, but will need to discuss.  Will also discuss simvastatin dosing recs at next appt).  Labs today.  Cont q6mo f/u, sooner if issues.      End of Life  "Planning:  Patient currently has an advanced directive: Yes, but it is old.  Encouraged her to update hers.    COUNSELING:  Reviewed preventive health counseling, as reflected in patient instructions    BP Screening:   Last 3 BP Readings:    BP Readings from Last 3 Encounters:   05/01/18 (!) 143/92   10/17/17 124/74   03/01/17 130/78       The following was recommended to the patient:  Re-screen within 4 weeks and recommend lifestyle modifications    Estimated body mass index is 41.63 kg/(m^2) as calculated from the following:    Height as of this encounter: 5' 9\" (1.753 m).    Weight as of this encounter: 281 lb 14.4 oz (127.9 kg).  Weight management plan: Discussed healthy diet and exercise guidelines and patient will follow up in 6 months in clinic to re-evaluate.   reports that she quit smoking about 33 years ago. She has never used smokeless tobacco.      Appropriate preventive services were discussed with this patient, including applicable screening as appropriate for cardiovascular disease, diabetes, osteopenia/osteoporosis, and glaucoma.  As appropriate for age/gender, discussed screening for colorectal cancer, prostate cancer, breast cancer, and cervical cancer. Checklist reviewing preventive services available has been given to the patient.    Reviewed patients plan of care and provided an AVS. The Basic Care Plan (routine screening as documented in Health Maintenance) for Hiro meets the Care Plan requirement. This Care Plan has been established and reviewed with the Patient.    Counseling Resources:  ATP IV Guidelines  Pooled Cohorts Equation Calculator  Breast Cancer Risk Calculator  FRAX Risk Assessment  ICSI Preventive Guidelines  Dietary Guidelines for Americans, 2010  USDA's MyPlate  ASA Prophylaxis  Lung CA Screening    Marie Robledo MD  [facility]   "

## 2018-05-01 NOTE — MR AVS SNAPSHOT
After Visit Summary   5/1/2018    Hiro Carranza    MRN: [id number]           Patient Information     Date Of Birth          1945        Visit Information        Provider Department      5/1/2018 9:00 AM Marie Robledo MD [facility]        Today's Diagnoses     Encounter for routine adult health examination without abnormal findings    -  1    Need for Tdap vaccination        Stallings's esophagus without dysplasia        Gastroesophageal reflux disease with esophagitis        Herpes simplex vulvovaginitis        MARK (obstructive sleep apnea)(Mild AHI=5)        Hyperlipidemia LDL goal <130        Generalized edema        Mixed stress and urge urinary incontinence          Care Instructions      Preventive Health Recommendations    Female Ages 65 +    Yearly exam:     See your health care provider every year in order to  o Review health changes.   o Discuss preventive care.    o Review your medicines if your doctor has prescribed any.      You no longer need a yearly Pap test unless you've had an abnormal Pap test in the past 10 years. If you have vaginal symptoms, such as bleeding or discharge, be sure to talk with your provider about a Pap test.      Every 1 to 2 years, have a mammogram.  If you are over 69, talk with your health care provider about whether or not you want to continue having screening mammograms.      Every 10 years, have a colonoscopy. Or, have a yearly FIT test (stool test). These exams will check for colon cancer.       Have a cholesterol test every 5 years, or more often if your doctor advises it.       Have a diabetes test (fasting glucose) every three years. If you are at risk for diabetes, you should have this test more often.       At age 65, have a bone density scan (DEXA) to check for osteoporosis (brittle bone disease).    Shots:    Get a flu shot each year.    Get a tetanus shot every 10 years.    Talk to your doctor about your pneumonia vaccines.  There are now two you should receive - Pneumovax (PPSV 23) and Prevnar (PCV 13).    Talk to your doctor about the shingles vaccine.    Talk to your doctor about the hepatitis B vaccine.    Nutrition:     Eat at least 5 servings of fruits and vegetables each day.      Eat whole-grain bread, whole-wheat pasta and brown rice instead of white grains and rice.      Talk to your provider about Calcium and Vitamin D.     Lifestyle    Exercise at least 150 minutes a week (30 minutes a day, 5 days a week). This will help you control your weight and prevent disease.      Limit alcohol to one drink per day.      No smoking.       Wear sunscreen to prevent skin cancer.       See your dentist twice a year for an exam and cleaning.      See your eye doctor every 1 to 2 years to screen for conditions such as glaucoma, macular degeneration and cataracts.    PLEASE CALL TO SCHEDULE YOUR MAMMOGRAM  [facility] [phone]  [facility] [phone]              Follow-ups after your visit        Additional Services     [facility] ADULT REF PROCED[facility] ONLY [facility] [phone]; Dr. JOSH Aguilera       Last Lab Result: Creatinine (mg/dL)       Date                     Value                 03/01/2017               0.69             ----------  Body mass index is 41.63 kg/(m^2).     Needed:  No  Language:  English    Patient will be contacted to schedule procedure.     Please be aware that coverage of these services is subject to the terms and limitations of your health insurance plan.  Call member services at your health plan with any benefit or coverage questions.  Any procedures must be performed at a [place] facility OR coordinated by your clinic's referral office.    Please bring the following with you to your appointment:    (1) Any X-Rays, CTs or MRIs which have been performed.  Contact the facility where they were done to arrange for  prior to your  "scheduled appointment.    (2) List of current medications   (3) This referral request   (4) Any documents/labs given to you for this referral                  Your next 10 appointments already scheduled     May 08, 2018   Procedure with Nahun Rivera MD   [facility] ([facility])    [address]  [place] [address]   [phone]           [facility] is located at [address]              Who to contact     If you have questions or need follow up information about today's clinic visit or your schedule please contact [facility] directly at [phone].  Normal or non-critical lab and imaging results will be communicated to you by MyChart, letter or phone within 4 business days after the clinic has received the results. If you do not hear from us within 7 days, please contact the clinic through MyChart or phone. If you have a critical or abnormal lab result, we will notify you by phone as soon as possible.  Submit refill requests through CallsFreeCalls or call your pharmacy and they will forward the refill request to us. Please allow 3 business days for your refill to be completed.          Additional Information About Your Visit        MyChart Information     CallsFreeCalls lets you send messages to your doctor, view your test results, renew your prescriptions, schedule appointments and more. To sign up, go to [email or web address] . Click on \"Log in\" on the left side of the screen, which will take you to the Welcome page. Then click on \"Sign up Now\" on the right side of the page.     You will be asked to enter the access code listed below, as well as some personal information. Please follow the directions to create your username and password.     Your access code is: [email or web address]  Expires: 2018  9:57 AM     Your access code will  in 90 days. If you need help or a new code, please call your [place] clinic or [phone].      " "  Care EveryWhere ID     This is your Care EveryWhere ID. This could be used by other organizations to access your [place] medical records  [id number]        Your Vitals Were     Pulse Temperature Height Pulse Oximetry Breastfeeding? BMI (Body Mass Index)    88 97.6  F (36.4  C) (Oral) 5' 9\" (1.753 m) 95% No 41.63 kg/m2       Blood Pressure from Last 3 Encounters:   05/01/18 (!) 143/92   10/17/17 124/74   03/01/17 130/78    Weight from Last 3 Encounters:   05/01/18 281 lb 14.4 oz (127.9 kg)   10/17/17 280 lb 8 oz (127.2 kg)   03/01/17 273 lb 3.2 oz (123.9 kg)              We Performed the Following     Albumin Random Urine Quantitative with Creat Ratio     Basic metabolic panel  (Ca, Cl, CO2, Creat, Gluc, K, Na, BUN)     GASTROENTEROLOGY ADULT REF PROCEDURE ONLY [facility] [phone]; Dr. JOSH Aguilera     Lipid panel reflex to direct LDL Fasting     TDAP VACCINE (ADACEL)          Today's Medication Changes          These changes are accurate as of 5/1/18  9:57 AM.  If you have any questions, ask your nurse or doctor.               These medicines have changed or have updated prescriptions.        Dose/Directions    hydrochlorothiazide 25 MG tablet   Commonly known as:  HYDRODIURIL   This may have changed:  See the new instructions.   Used for:  Generalized edema   Changed by:  Marie Robledo MD        Dose:  25 mg   Take 1 tablet (25 mg) by mouth daily   Quantity:  90 tablet   Refills:  1       omeprazole 20 MG CR capsule   Commonly known as:  priLOSEC   This may have changed:  See the new instructions.   Used for:  Stallings's esophagus without dysplasia   Changed by:  Marie Robledo MD        Dose:  20 mg   Take 1 capsule (20 mg) by mouth 2 times daily   Quantity:  180 capsule   Refills:  1       simvastatin 20 MG tablet   Commonly known as:  ZOCOR   This may have changed:  See the new instructions.   Used for:  Hyperlipidemia LDL goal <130   Changed by:  Marie Robledo MD     "    TAKE ONE TABLET BY MOUTH AT BEDTIME   Quantity:  90 tablet   Refills:  1       tolterodine 2 MG tablet   Commonly known as:  DETROL   This may have changed:  See the new instructions.   Used for:  Mixed stress and urge urinary incontinence   Changed by:  Marie Robledo MD        Dose:  2 mg   Take 1 tablet (2 mg) by mouth 2 times daily   Quantity:  180 tablet   Refills:  1       * valACYclovir 500 MG tablet   Commonly known as:  VALTREX   This may have changed:  See the new instructions.   Used for:  Herpes simplex vulvovaginitis        TAKE ONE TABLET BY MOUTH TWICE DAILY   Quantity:  36 tablet   Refills:  0       * valACYclovir 500 MG tablet   Commonly known as:  VALTREX   This may have changed:  You were already taking a medication with the same name, and this prescription was added. Make sure you understand how and when to take each.   Used for:  Herpes simplex vulvovaginitis   Changed by:  Marie Robledo MD        Dose:  500 mg   Take 1 tablet (500 mg) by mouth 2 times daily For 3 days total   Quantity:  36 tablet   Refills:  3       * Notice:  This list has 2 medication(s) that are the same as other medications prescribed for you. Read the directions carefully, and ask your doctor or other care provider to review them with you.         Where to get your medicines      These medications were sent to [id number] - [place], [place] - [address]  [address] [address]     Phone:  [phone]     hydrochlorothiazide 25 MG tablet    omeprazole 20 MG CR capsule    simvastatin 20 MG tablet    tolterodine 2 MG tablet    valACYclovir 500 MG tablet                Primary Care Provider Office Phone # Fax #    Marie Robledo [phone] [address] [address]        Equal Access to Services     LILLIAN GARCIA AH: Hadwalker Chatterjee, waerinnda lugenaro, qaybta gordoalhany madrigal. So  [facility] [phone].    ATENCIÓN: Si freddie yan, tiene a choi disposición servicios gratuitos de asistencia lingüística. Nghia upton [phone].    We comply with applicable federal civil rights laws and [place] laws. We do not discriminate on the basis of race, color, national origin, age, disability, sex, sexual orientation, or gender identity.            Thank you!     Thank you for choosing [facility]  for your care. Our goal is always to provide you with excellent care. Hearing back from our patients is one way we can continue to improve our services. Please take a few minutes to complete the written survey that you may receive in the mail after your visit with us. Thank you!             Your Updated Medication List - Protect others around you: Learn how to safely use, store and throw away your medicines at www.disposemymeds.org.          This list is accurate as of 5/1/18  9:57 AM.  Always use your most recent med list.                   Brand Name Dispense Instructions for use Diagnosis    clotrimazole 1 % cream    LOTRIMIN    60 g    APPLY TOPICALLY 2 TIMES DAILY    Intertriginous candidiasis       DAILY MULTIVITAMIN PO      DAILY        Fish Oil 500 MG Caps      2 tablets daily        GLUCOSAMINE-CHONDROITIN PO           hydrochlorothiazide 25 MG tablet    HYDRODIURIL    90 tablet    Take 1 tablet (25 mg) by mouth daily    Generalized edema       omeprazole 20 MG CR capsule    priLOSEC    180 capsule    Take 1 capsule (20 mg) by mouth 2 times daily    Stallings's esophagus without dysplasia       polyethylene glycol 0.4%- propylene glycol 0.3% 0.4-0.3 % Soln ophthalmic solution    SYSTANE ULTRA     Place 1 drop into both eyes At Bedtime        PROBIOTIC PO      Take by mouth daily        simvastatin 20 MG tablet    ZOCOR    90 tablet    TAKE ONE TABLET BY MOUTH AT BEDTIME    Hyperlipidemia LDL goal <130       tolterodine 2 MG tablet    DETROL    180 tablet    Take 1 tablet (2 mg) by mouth 2 times  daily    Mixed stress and urge urinary incontinence       * valACYclovir 500 MG tablet    VALTREX    36 tablet    TAKE ONE TABLET BY MOUTH TWICE DAILY    Herpes simplex vulvovaginitis       * valACYclovir 500 MG tablet    VALTREX    36 tablet    Take 1 tablet (500 mg) by mouth 2 times daily For 3 days total    Herpes simplex vulvovaginitis       VITAMIN D3 PO      Take by mouth daily        * Notice:  This list has 2 medication(s) that are the same as other medications prescribed for you. Read the directions carefully, and ask your doctor or other care provider to review them with you.

## 2018-05-02 LAB
ANION GAP SERPL CALCULATED.3IONS-SCNC: 11 MMOL/L (ref 3–14)
BUN SERPL-MCNC: 13 MG/DL (ref 7–30)
CALCIUM SERPL-MCNC: 9.4 MG/DL (ref 8.5–10.1)
CHLORIDE SERPL-SCNC: 103 MMOL/L (ref 94–109)
CHOLEST SERPL-MCNC: 173 MG/DL
CO2 SERPL-SCNC: 25 MMOL/L (ref 20–32)
CREAT SERPL-MCNC: 0.71 MG/DL (ref 0.52–1.04)
GFR SERPL CREATININE-BSD FRML MDRD: 81 ML/MIN/1.7M2
GLUCOSE SERPL-MCNC: 76 MG/DL (ref 70–99)
HDLC SERPL-MCNC: 65 MG/DL
LDLC SERPL CALC-MCNC: 88 MG/DL
NONHDLC SERPL-MCNC: 108 MG/DL
POTASSIUM SERPL-SCNC: 3.6 MMOL/L (ref 3.4–5.3)
SODIUM SERPL-SCNC: 139 MMOL/L (ref 133–144)
TRIGL SERPL-MCNC: 100 MG/DL

## 2018-05-19 NOTE — PROGRESS NOTES
Please send letter below with copy of results.  Thanks! CW    Here are your lab results from your recent visit...  -Your cholesterol panel looks very good with a low LDL (the bad cholesterol) and a high HDL (the good cholesterol).   -Your microalbumin level (which is the urine test that can signal signs of early chronic kidney disease if elevated to >30) looks very low which is good.  -Your basic metabolic panel (which includes electrolyte levels, blood sugar level and kidney function tests) is normal.    Please let me know if you have any questions.  Best,   Mitchell Robledo MD

## 2018-06-08 DIAGNOSIS — B37.2 INTERTRIGINOUS CANDIDIASIS: ICD-10-CM

## 2018-06-08 RX ORDER — CLOTRIMAZOLE 1 %
CREAM (GRAM) TOPICAL
Qty: 60 G | Refills: 0 | Status: SHIPPED | OUTPATIENT
Start: 2018-06-08 | End: 2018-06-18

## 2018-06-08 NOTE — TELEPHONE ENCOUNTER
Routing refill request to provider for review/approval because:  Drug not on the [facility] refill protocol   Bernadette MCCULLOUGH RN    Requested Prescriptions   Pending Prescriptions Disp Refills     clotrimazole (LOTRIMIN) 1 % cream [Pharmacy Med Name: Clotrimazole External Cream 1 %] 60 g 0     Sig: APPLY TOPICALLY 2 TIMES DAILY    There is no refill protocol information for this order

## 2018-06-18 DIAGNOSIS — B37.2 INTERTRIGINOUS CANDIDIASIS: ICD-10-CM

## 2018-06-19 RX ORDER — CLOTRIMAZOLE 1 %
CREAM (GRAM) TOPICAL
Qty: 60 G | Refills: 0 | Status: SHIPPED | OUTPATIENT
Start: 2018-06-19 | End: 2020-01-30

## 2018-06-19 NOTE — TELEPHONE ENCOUNTER
Routing refill request to provider for review/approval because:  Drug not on the [facility] refill protocol   Bernadette MCCULLOUGH RN

## 2018-06-19 NOTE — TELEPHONE ENCOUNTER
Pending Prescriptions:                       Disp   Refills    clotrimazole (LOTRIMIN) 1 % cream [Pharma*60 g   0            Sig: APPLY TOPICALLY 2 TIMES DAILY          Last Written Prescription Date:  06/08/2018  Last Fill Quantity: ,   # refills: 0  Last Office Visit: 05/01/2018  Future Office visit:       Routing refill request to provider for review/approval because:  No protocol completed

## 2018-06-21 ENCOUNTER — HOSPITAL ENCOUNTER (OUTPATIENT)
Facility: CLINIC | Age: 73
Discharge: HOME | End: 2018-06-21
Attending: SPECIALIST | Admitting: SPECIALIST
Payer: MEDICARE

## 2018-06-21 ENCOUNTER — SURGERY (OUTPATIENT)
Age: 73
End: 2018-06-21

## 2018-06-21 ENCOUNTER — TRANSFERRED RECORDS (OUTPATIENT)
Dept: HEALTH INFORMATION MANAGEMENT | Facility: CLINIC | Age: 73
End: 2018-06-21

## 2018-06-21 VITALS
OXYGEN SATURATION: 86 % | RESPIRATION RATE: 10 BRPM | DIASTOLIC BLOOD PRESSURE: 81 MMHG | SYSTOLIC BLOOD PRESSURE: 112 MMHG

## 2018-06-21 LAB
COLONOSCOPY: NORMAL
UPPER GI ENDOSCOPY: NORMAL

## 2018-06-21 PROCEDURE — 88305 TISSUE EXAM BY PATHOLOGIST: CPT | Performed by: SPECIALIST

## 2018-06-21 PROCEDURE — 99153 MOD SED SAME PHYS/QHP EA: CPT

## 2018-06-21 PROCEDURE — 25000125 ZZHC RX 250: Performed by: SPECIALIST

## 2018-06-21 PROCEDURE — 88305 TISSUE EXAM BY PATHOLOGIST: CPT | Mod: 26 | Performed by: SPECIALIST

## 2018-06-21 PROCEDURE — 25000128 H RX IP 250 OP 636: Performed by: SPECIALIST

## 2018-06-21 PROCEDURE — G0500 MOD SEDAT ENDO SERVICE >5YRS: HCPCS | Performed by: SPECIALIST

## 2018-06-21 PROCEDURE — 43239 EGD BIOPSY SINGLE/MULTIPLE: CPT | Performed by: SPECIALIST

## 2018-06-21 PROCEDURE — 45385 COLONOSCOPY W/LESION REMOVAL: CPT | Mod: PT | Performed by: SPECIALIST

## 2018-06-21 RX ORDER — ONDANSETRON 2 MG/ML
4 INJECTION INTRAMUSCULAR; INTRAVENOUS
Status: DISCONTINUED | OUTPATIENT
Start: 2018-06-21 | End: 2018-06-21 | Stop reason: HOSPADM

## 2018-06-21 RX ORDER — FENTANYL CITRATE 50 UG/ML
INJECTION, SOLUTION INTRAMUSCULAR; INTRAVENOUS PRN
Status: DISCONTINUED | OUTPATIENT
Start: 2018-06-21 | End: 2018-06-21 | Stop reason: HOSPADM

## 2018-06-21 RX ORDER — LIDOCAINE 40 MG/G
CREAM TOPICAL
Status: DISCONTINUED | OUTPATIENT
Start: 2018-06-21 | End: 2018-06-21 | Stop reason: HOSPADM

## 2018-06-21 RX ADMIN — MIDAZOLAM 0.5 MG: 1 INJECTION INTRAMUSCULAR; INTRAVENOUS at 10:43

## 2018-06-21 RX ADMIN — FENTANYL CITRATE 25 MCG: 50 INJECTION, SOLUTION INTRAMUSCULAR; INTRAVENOUS at 10:32

## 2018-06-21 RX ADMIN — MIDAZOLAM 0.5 MG: 1 INJECTION INTRAMUSCULAR; INTRAVENOUS at 11:02

## 2018-06-21 RX ADMIN — FENTANYL CITRATE 25 MCG: 50 INJECTION, SOLUTION INTRAMUSCULAR; INTRAVENOUS at 10:36

## 2018-06-21 RX ADMIN — MIDAZOLAM 1 MG: 1 INJECTION INTRAMUSCULAR; INTRAVENOUS at 10:29

## 2018-06-21 RX ADMIN — FENTANYL CITRATE 25 MCG: 50 INJECTION, SOLUTION INTRAMUSCULAR; INTRAVENOUS at 10:50

## 2018-06-21 RX ADMIN — MIDAZOLAM 0.5 MG: 1 INJECTION INTRAMUSCULAR; INTRAVENOUS at 10:32

## 2018-06-21 RX ADMIN — MIDAZOLAM 0.5 MG: 1 INJECTION INTRAMUSCULAR; INTRAVENOUS at 10:37

## 2018-06-21 RX ADMIN — FENTANYL CITRATE 25 MCG: 50 INJECTION, SOLUTION INTRAMUSCULAR; INTRAVENOUS at 11:24

## 2018-06-21 RX ADMIN — MIDAZOLAM 1 MG: 1 INJECTION INTRAMUSCULAR; INTRAVENOUS at 11:18

## 2018-06-21 RX ADMIN — TOPICAL ANESTHETIC 1 SPRAY: 200 SPRAY DENTAL; PERIODONTAL at 11:17

## 2018-06-21 RX ADMIN — FENTANYL CITRATE 50 MCG: 50 INJECTION, SOLUTION INTRAMUSCULAR; INTRAVENOUS at 11:14

## 2018-06-21 RX ADMIN — FENTANYL CITRATE 50 MCG: 50 INJECTION, SOLUTION INTRAMUSCULAR; INTRAVENOUS at 10:29

## 2018-06-21 RX ADMIN — MIDAZOLAM 0.5 MG: 1 INJECTION INTRAMUSCULAR; INTRAVENOUS at 11:25

## 2018-06-21 RX ADMIN — MIDAZOLAM 0.5 MG: 1 INJECTION INTRAMUSCULAR; INTRAVENOUS at 11:14

## 2018-06-21 RX ADMIN — MIDAZOLAM 0.5 MG: 1 INJECTION INTRAMUSCULAR; INTRAVENOUS at 10:47

## 2018-06-21 NOTE — H&P
Pre-Endoscopy History and Physical     Hiro Carranza MRN# [id number]   YOB: 1945 Age: 72 year old     Date of Procedure: 6/21/2018  Primary care provider: Marie Robledo  Type of Endoscopy: Colonoscopy with possible biopsy, possible polypectomy and Gastroscopy with possible biopsy, possible dilation  Reason for Procedure: screening, surveillance of Stallings's esophagus  Type of Anesthesia Anticipated: Conscious Sedation    HPI:    Hiro is a 72 year old female who will be undergoing the above procedure.      A history and physical has been performed. The patient's medications and allergies have been reviewed. The risks and benefits of the procedure and the sedation options and risks were discussed with the patient.  All questions were answered and informed consent was obtained.      She denies a personal or family history of anesthesia complications or bleeding disorders.     Patient Active Problem List   Diagnosis     Stallings esophagus     Mixed stress and urge urinary incontinence     Pain in shoulder     Osteopenia     Onychogryposis and onychomycosis     Hyperlipidemia LDL goal <130     Other viral warts     Genital herpes     Advance Care Planning     Gastroesophageal reflux disease with esophagitis     Other dental procedure status     Arthritis of knee, right     Acute posthemorrhagic anemia     Physical deconditioning     Constipation     MARK (obstructive sleep apnea)(Mild AHI=5)     Calculus of gallbladder without cholecystitis without obstruction     Cholelithiases     Shortened AL interval        Past Medical History:   Diagnosis Date     Arthritis      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 Aguilera), q3yr f/u, no dysplasia     GERD (gastroesophageal reflux disease)     omeprazole     Hyperlipidemia LDL goal < 160     simvastatin 6/09     Incontinence     detrol helping (stress & urge incontinence)     Incontinence of urine      Osteopenia 6/5/2009     Short  GA-normal QRS complex syndrome 9/30/08    cards EKG overread- benign unless pt develops palpitations        Past Surgical History:   Procedure Laterality Date     ARTHROPLASTY KNEE Right 6/10/2015    Procedure: ARTHROPLASTY KNEE;  Surgeon: Jorge Luis Snyder MD;  Location: [facility] OR     C NONSPECIFIC PROCEDURE  2004    ovarian cyst drainage, resection of fibroid tumors     C NONSPECIFIC PROCEDURE  2005    L cataract     C NONSPECIFIC PROCEDURE  6/08    L knee replacement     C RAD RESEC TONSIL/PILLARS  1953     C SHOULDER SURG PROC UNLISTED  2009    lt shoulder arthritis- replacement     C TOTAL KNEE ARTHROPLASTY      left knee total 08     ESOPHAGOSCOPY, GASTROSCOPY, DUODENOSCOPY (EGD), COMBINED N/A 5/21/2015    Procedure: COMBINED ESOPHAGOSCOPY, GASTROSCOPY, DUODENOSCOPY (EGD), BIOPSY SINGLE OR MULTIPLE;  Surgeon: Venkatesh Aguilera MD;  Location:  GI     LAPAROSCOPIC CHOLECYSTECTOMY N/A 8/4/2016    Procedure: LAPAROSCOPIC CHOLECYSTECTOMY;  Surgeon: Venkatesh Ford MD;  Location:  OR     SURGICAL HISTORY OF -   2008    R cataract       Social History   Substance Use Topics     Smoking status: Former Smoker     Quit date: 1/1/1985     Smokeless tobacco: Never Used      Comment: 20 yrs smoking '65-'85, 1 PPD     Alcohol use 0.0 oz/week     0 Standard drinks or equivalent per week      Comment: Occasional 2 beers weekly. 1-2 glasses wime monthly       Family History   Problem Relation Age of Onset     C.A.D. Father      triple bypass in his late 60's     Circulatory Father      Lipids Father      Obesity Father      C.A.D. Maternal Grandfather      death from MI in early 60s     Obesity Maternal Grandfather      C.A.D. Paternal Grandfather      MI in early 70s     Obesity Paternal Grandfather      Hypertension Mother      Arthritis Mother      Lipids Mother      Musculoskeletal Disorder Mother      double knee replacement     Obesity Mother      Cerebrovascular Disease Maternal Grandmother      Obesity Maternal  "Grandmother      Obesity Paternal Grandmother        Prior to Admission medications    Medication Sig Start Date End Date Taking? Authorizing Provider   Cholecalciferol (VITAMIN D3 PO) Take by mouth daily    Reported, Patient   clotrimazole (LOTRIMIN) 1 % cream APPLY TOPICALLY 2 TIMES DAILY 6/19/18   Marie Robledo MD   DAILY MULTIVITAMIN PO DAILY    Reported, Patient   FISH  MG OR CAPS 2 tablets daily    Reported, Patient   GLUCOSAMINE-CHONDROITIN PO     Reported, Patient   hydrochlorothiazide (HYDRODIURIL) 25 MG tablet Take 1 tablet (25 mg) by mouth daily 5/1/18  Yes Marie Robledo MD   omeprazole (PRILOSEC) 20 MG CR capsule Take 1 capsule (20 mg) by mouth 2 times daily 5/1/18  Yes Marie Robledo MD   polyethylene glycol 0.4%- propylene glycol 0.3% (SYSTANE ULTRA) 0.4-0.3 % SOLN ophthalmic solution Place 1 drop into both eyes At Bedtime    Reported, Patient   simvastatin (ZOCOR) 20 MG tablet TAKE ONE TABLET BY MOUTH AT BEDTIME 5/1/18  Yes Marie Robledo MD   tolterodine (DETROL) 2 MG tablet Take 1 tablet (2 mg) by mouth 2 times daily 5/1/18  Yes Marie Robledo MD   valACYclovir (VALTREX) 500 MG tablet Take 1 tablet (500 mg) by mouth 2 times daily For 3 days total 5/1/18  Yes Marie Robledo MD   valACYclovir (VALTREX) 500 MG tablet TAKE ONE TABLET BY MOUTH TWICE DAILY   Patient taking differently: PRN 4/23/18  Yes Marie Robledo MD       Allergies   Allergen Reactions     No Known Drug Allergies         REVIEW OF SYSTEMS:   5 point ROS negative except as noted above in HPI, including Gen., Resp., CV, GI &  system review.    PHYSICAL EXAM:   /77  Resp 16  SpO2 98% Estimated body mass index is 41.63 kg/(m^2) as calculated from the following:    Height as of 5/1/18: 1.753 m (5' 9\").    Weight as of 5/1/18: 127.9 kg (281 lb 14.4 oz).   GENERAL APPEARANCE: alert, and oriented  MENTAL STATUS: alert  AIRWAY EXAM: Mallampatti Class II " (visualization of the soft palate, fauces, and uvula)  RESP: lungs clear to auscultation - no rales, rhonchi or wheezes  CV: regular rates and rhythm  DIAGNOSTICS:    Not indicated    IMPRESSION   ASA Class 2 - Mild systemic disease    PLAN:   Plan for Colonoscopy with possible biopsy, possible polypectomy and Gastroscopy with possible biopsy, possible dilation. We discussed the risks, benefits and alternatives and the patient wished to proceed.    The above has been forwarded to the consulting provider.      Signed Electronically by: Venkatesh Aguilera  June 21, 2018

## 2018-06-21 NOTE — BRIEF OP NOTE
[facility] Brief Operative Note    Pre-operative diagnosis: f/u Stallings's  screening   Post-operative diagnosis colon polyp, hiatal hernia (large), Stallings's esophagus     Procedure: Procedure(s):  colonscopy - Wound Class: II-Clean Contaminated  gastroscopy - Wound Class: II-Clean Contaminated   Surgeon(s): Surgeon(s) and Role:     * Venkatesh Aguilera MD - Primary   Estimated blood loss: * No values recorded between 6/21/2018 12:00 AM and 6/21/2018 11:48 AM *    Specimens:   ID Type Source Tests Collected by Time Destination   A :  Polyp Large Intestine, Splenic Flexure SURGICAL PATHOLOGY EXAM Venkatesh Aguilera MD 6/21/2018 11:05 AM    B : 32cm  Biopsy Esophagus SURGICAL PATHOLOGY EXAM Venkatesh Aguilera MD 6/21/2018 11:38 AM    C : 30cm  Biopsy Esophagus SURGICAL PATHOLOGY EXAM Venkatesh Aguilera MD 6/21/2018 11:39 AM    D : 28cm  Biopsy Esophagus SURGICAL PATHOLOGY EXAM Venkatesh Aguilera MD 6/21/2018 11:40 AM    E : 26cm  Tissue Esophagus SURGICAL PATHOLOGY EXAM Venkatesh Aguilera MD 6/21/2018 11:40 AM       Findings: Please see ProVation procedure note in Chart Review

## 2018-06-22 LAB — COPATH REPORT: NORMAL

## 2018-07-06 ENCOUNTER — TRANSFERRED RECORDS (OUTPATIENT)
Dept: HEALTH INFORMATION MANAGEMENT | Facility: CLINIC | Age: 73
End: 2018-07-06

## 2018-07-30 ENCOUNTER — TRANSFERRED RECORDS (OUTPATIENT)
Dept: HEALTH INFORMATION MANAGEMENT | Facility: CLINIC | Age: 73
End: 2018-07-30

## 2018-08-07 ENCOUNTER — OFFICE VISIT (OUTPATIENT)
Dept: FAMILY MEDICINE | Facility: CLINIC | Age: 73
End: 2018-08-07
Payer: COMMERCIAL

## 2018-08-07 VITALS
HEART RATE: 85 BPM | TEMPERATURE: 97.6 F | HEIGHT: 69 IN | SYSTOLIC BLOOD PRESSURE: 123 MMHG | WEIGHT: 281.4 LBS | BODY MASS INDEX: 41.68 KG/M2 | DIASTOLIC BLOOD PRESSURE: 81 MMHG | OXYGEN SATURATION: 99 %

## 2018-08-07 DIAGNOSIS — G47.33 OSA (OBSTRUCTIVE SLEEP APNEA): ICD-10-CM

## 2018-08-07 DIAGNOSIS — E66.01 MORBID OBESITY (H): ICD-10-CM

## 2018-08-07 DIAGNOSIS — K22.710 BARRETT'S ESOPHAGUS WITH LOW GRADE DYSPLASIA: ICD-10-CM

## 2018-08-07 DIAGNOSIS — R94.31 SHORTENED PR INTERVAL: ICD-10-CM

## 2018-08-07 DIAGNOSIS — K21.00 GASTROESOPHAGEAL REFLUX DISEASE WITH ESOPHAGITIS: ICD-10-CM

## 2018-08-07 DIAGNOSIS — Z01.818 PREOP GENERAL PHYSICAL EXAM: Primary | ICD-10-CM

## 2018-08-07 LAB
ERYTHROCYTE [DISTWIDTH] IN BLOOD BY AUTOMATED COUNT: 14.3 % (ref 10–15)
HCT VFR BLD AUTO: 45.5 % (ref 35–47)
HGB BLD-MCNC: 15.2 G/DL (ref 11.7–15.7)
MCH RBC QN AUTO: 30.8 PG (ref 26.5–33)
MCHC RBC AUTO-ENTMCNC: 33.4 G/DL (ref 31.5–36.5)
MCV RBC AUTO: 92 FL (ref 78–100)
PLATELET # BLD AUTO: 245 10E9/L (ref 150–450)
RBC # BLD AUTO: 4.94 10E12/L (ref 3.8–5.2)
WBC # BLD AUTO: 7.6 10E9/L (ref 4–11)

## 2018-08-07 PROCEDURE — 93000 ELECTROCARDIOGRAM COMPLETE: CPT | Performed by: FAMILY MEDICINE

## 2018-08-07 PROCEDURE — 99215 OFFICE O/P EST HI 40 MIN: CPT | Performed by: FAMILY MEDICINE

## 2018-08-07 PROCEDURE — 85027 COMPLETE CBC AUTOMATED: CPT | Performed by: FAMILY MEDICINE

## 2018-08-07 PROCEDURE — 36415 COLL VENOUS BLD VENIPUNCTURE: CPT | Performed by: FAMILY MEDICINE

## 2018-08-07 PROCEDURE — 80053 COMPREHEN METABOLIC PANEL: CPT | Performed by: FAMILY MEDICINE

## 2018-08-07 NOTE — PATIENT INSTRUCTIONS
Before Your Surgery      Call your surgeon if there is any change in your health. This includes signs of a cold or flu (such as a sore throat, runny nose, cough, rash or fever).    Do not smoke, drink alcohol or take over the counter medicine (unless your surgeon or primary care doctor tells you to) for the 24 hours before and after surgery.    If you take prescribed drugs: Follow your doctor s orders about which medicines to take and which to stop until after surgery.    Eating and drinking prior to surgery: follow the instructions from your surgeon    Take a shower or bath the night before surgery. Use the soap your surgeon gave you to gently clean your skin. If you do not have soap from your surgeon, use your regular soap. Do not shave or scrub the surgery site.  Wear clean pajamas and have clean sheets on your bed.     PLEASE CALL TO SCHEDULE YOUR MAMMOGRAM  [facility] [phone]  [facility] [phone]  [facility]   [phone]

## 2018-08-07 NOTE — MR AVS SNAPSHOT
After Visit Summary   8/7/2018    Hiro Carranza    MRN: [id number]           Patient Information     Date Of Birth          1945        Visit Information        Provider Department      8/7/2018 1:30 PM Marie Robledo MD [facility]        Today's Diagnoses     Preop general physical exam    -  1    Stallings's esophagus with low grade dysplasia        Morbid obesity (H)        Gastroesophageal reflux disease with esophagitis        MARK (obstructive sleep apnea)(Mild AHI=5)        Shortened AR interval          Care Instructions      Before Your Surgery      Call your surgeon if there is any change in your health. This includes signs of a cold or flu (such as a sore throat, runny nose, cough, rash or fever).    Do not smoke, drink alcohol or take over the counter medicine (unless your surgeon or primary care doctor tells you to) for the 24 hours before and after surgery.    If you take prescribed drugs: Follow your doctor s orders about which medicines to take and which to stop until after surgery.    Eating and drinking prior to surgery: follow the instructions from your surgeon    Take a shower or bath the night before surgery. Use the soap your surgeon gave you to gently clean your skin. If you do not have soap from your surgeon, use your regular soap. Do not shave or scrub the surgery site.  Wear clean pajamas and have clean sheets on your bed.     PLEASE CALL TO SCHEDULE YOUR MAMMOGRAM  [facility] [phone]  [facility] [place] [phone]  [facility]   [phone]              Follow-ups after your visit        Your next 10 appointments already scheduled     Nov 02, 2018  9:30 AM CDT   Office Visit with Marie Robledo MD   [facility] ([facility])    [address] [address] [phone]           Bring a current list of meds and any records  "pertaining to this visit. For Physicals, please bring immunization records and any forms needing to be filled out. Please arrive 10 minutes early to complete paperwork.              Who to contact     If you have questions or need follow up information about today's clinic visit or your schedule please contact [facility] directly at [phone].  Normal or non-critical lab and imaging results will be communicated to you by MyChart, letter or phone within 4 business days after the clinic has received the results. If you do not hear from us within 7 days, please contact the clinic through [employer] or phone. If you have a critical or abnormal lab result, we will notify you by phone as soon as possible.  Submit refill requests through [employer] or call your pharmacy and they will forward the refill request to us. Please allow 3 business days for your refill to be completed.          Additional Information About Your Visit        MyChart Information     [employer] lets you send messages to your doctor, view your test results, renew your prescriptions, schedule appointments and more. To sign up, go to [email or web address] . Click on \"Log in\" on the left side of the screen, which will take you to the Welcome page. Then click on \"Sign up Now\" on the right side of the page.     You will be asked to enter the access code listed below, as well as some personal information. Please follow the directions to create your username and password.     Your access code is: [email or web address]  Expires: 2018  2:23 PM     Your access code will  in 90 days. If you need help or a new code, please call your [facility] clinic or [phone].        Care EveryWhere ID     This is your Care EveryWhere ID. This could be used by other organizations to access your [facility] medical records  [id number]        Your Vitals Were     Pulse Temperature Height Pulse Oximetry Breastfeeding? BMI (Body Mass Index)    85 97.6  F (36.4  C) " "(Oral) 5' 9\" (1.753 m) 99% No 41.56 kg/m2       Blood Pressure from Last 3 Encounters:   08/07/18 123/81   06/21/18 112/81   05/01/18 (!) 143/92    Weight from Last 3 Encounters:   08/07/18 281 lb 6.4 oz (127.6 kg)   05/01/18 281 lb 14.4 oz (127.9 kg)   10/17/17 280 lb 8 oz (127.2 kg)              We Performed the Following     Comprehensive metabolic panel (BMP + Alb, Alk Phos, ALT, AST, Total. Bili, TP)     EKG 12-lead complete w/read - Clinics        Primary Care Provider Office Phone # Fax #    Marie Robledo [phone] [address] [address]        Equal Access to Services     [facility]: Hadii aad ku hadasho Soomaali, waaxda luqadaha, qaybta kaalmada adeegyada, waxay idiin hayaashelbi johns . So [facility] [phone].    ATENCIÓN: Si habla español, tiene a choi disposición servicios gratuitos de asistencia lingüística. Llame al [phone].    We comply with applicable federal civil rights laws and [place] laws. We do not discriminate on the basis of race, color, national origin, age, disability, sex, sexual orientation, or gender identity.            Thank you!     Thank you for choosing [facility]  for your care. Our goal is always to provide you with excellent care. Hearing back from our patients is one way we can continue to improve our services. Please take a few minutes to complete the written survey that you may receive in the mail after your visit with us. Thank you!             Your Updated Medication List - Protect others around you: Learn how to safely use, store and throw away your medicines at www.disposemymeds.org.          This list is accurate as of 8/7/18  2:23 PM.  Always use your most recent med list.                   Brand Name Dispense Instructions for use Diagnosis    ADVIL PO           clotrimazole 1 % cream    LOTRIMIN    60 g    APPLY TOPICALLY 2 TIMES DAILY    Intertriginous candidiasis       DAILY " MULTIVITAMIN PO      DAILY        Fish Oil 500 MG Caps      2 tablets daily        GLUCOSAMINE-CHONDROITIN PO           hydrochlorothiazide 25 MG tablet    HYDRODIURIL    90 tablet    Take 1 tablet (25 mg) by mouth daily    Generalized edema       omeprazole 20 MG CR capsule    priLOSEC    180 capsule    Take 1 capsule (20 mg) by mouth 2 times daily    Stallings's esophagus without dysplasia       polyethylene glycol 0.4%- propylene glycol 0.3% 0.4-0.3 % Soln ophthalmic solution    SYSTANE ULTRA     Place 1 drop into both eyes At Bedtime        simvastatin 20 MG tablet    ZOCOR    90 tablet    TAKE ONE TABLET BY MOUTH AT BEDTIME    Hyperlipidemia LDL goal <130       tolterodine 2 MG tablet    DETROL    180 tablet    Take 1 tablet (2 mg) by mouth 2 times daily    Mixed stress and urge urinary incontinence       valACYclovir 500 MG tablet    VALTREX    36 tablet    Take 1 tablet (500 mg) by mouth 2 times daily For 3 days total    Herpes simplex vulvovaginitis       VITAMIN D3 PO      Take by mouth daily

## 2018-08-07 NOTE — LETTER
August 9, 2018      Hiro Carranza  [address] [place] [id number]        Dear ,    We are writing to inform you of your test results.    Here are your lab results from your recent visit...   -Your CMP (which includes electrolyte levels, blood sugar levels, and kidney and liver function tests) looks normal other than the lower glucose level.  Usually your glucose has been in the normal range.   -Your CBC labs (which includes blood labs looking for signs of infection or anemia) looks normal with no signs of anemia or infection.     Resulted Orders   Comprehensive metabolic panel (BMP + Alb, Alk Phos, ALT, AST, Total. Bili, TP)   Result Value Ref Range    Sodium 139 133 - 144 mmol/L    Potassium 3.4 3.4 - 5.3 mmol/L    Chloride 102 94 - 109 mmol/L    Carbon Dioxide 30 20 - 32 mmol/L    Anion Gap 7 3 - 14 mmol/L    Glucose 60 (L) 70 - 99 mg/dL      Comment:      Non Fasting    Urea Nitrogen 14 7 - 30 mg/dL    Creatinine 0.76 0.52 - 1.04 mg/dL    GFR Estimate 74 >60 mL/min/1.7m2      Comment:      Non  GFR Calc    GFR Estimate If Black >90 >60 mL/min/1.7m2      Comment:       GFR Calc    Calcium 8.7 8.5 - 10.1 mg/dL    Bilirubin Total 0.4 0.2 - 1.3 mg/dL    Albumin 3.7 3.4 - 5.0 g/dL    Protein Total 7.6 6.8 - 8.8 g/dL    Alkaline Phosphatase 104 40 - 150 U/L    ALT 22 0 - 50 U/L    AST 24 0 - 45 U/L   CBC with platelets   Result Value Ref Range    WBC 7.6 4.0 - 11.0 10e9/L    RBC Count 4.94 3.8 - 5.2 10e12/L    Hemoglobin 15.2 11.7 - 15.7 g/dL    Hematocrit 45.5 35.0 - 47.0 %    MCV 92 78 - 100 fl    MCH 30.8 26.5 - 33.0 pg    MCHC 33.4 31.5 - 36.5 g/dL    RDW 14.3 10.0 - 15.0 %    Platelet Count 245 150 - 450 10e9/L       If you have any questions or concerns, please call the clinic at the number listed above.       Sincerely,        Marie Robledo MD

## 2018-08-07 NOTE — PROGRESS NOTES
[facility]  [address]  [facility] [address] [phone]  Dept: [phone]    PRE-OP EVALUATION:  Today's date: 2018    Hiro Carranza (: 1945) presents for pre-operative evaluation assessment as requested by Dr. Johnny Echols.  She requires evaluation and anesthesia risk assessment prior to undergoing surgery/procedure for treatment of esophageal dysplasia.    Fax number for surgical facility: [phone]  Primary Physician: Marie Robledo  Type of Anesthesia Anticipated: General and to be determined    Patient has a Health Care Directive or Living Will:  NO    Preop Questions 2018   Who is doing your surgery? dr sukumar [facility]   What are you having done? Halo 360    Date of Surgery/Procedure: 2018   Facility or Hospital where procedure/surgery will be performed: [facility]   1.  Do you have a history of Heart attack, stroke, stent, coronary bypass surgery, or other heart surgery? No   2.  Do you ever have any pain or discomfort in your chest? No   3.  Do you have a history of  Heart Failure? No   4.   Are you troubled by shortness of breath when:  walking on a level surface, or up a slight hill, or at night? YES - when going up a slight hill, or walking a mile.  Stable, though, just doesn't have much endurance.   5.  Do you currently have a cold, bronchitis or other respiratory infection? No   6.  Do you have a cough, shortness of breath, or wheezing? No   7.  Do you sometimes get pains in the calves of your legs when you walk? No   8. Do you or anyone in your family have previous history of blood clots? No   9.  Do you or does anyone in your family have a serious bleeding problem such as prolonged bleeding following surgeries or cuts? No   10. Have you ever had problems with anemia or been told to take iron pills? YES - prior to last surgery, she was taking iron.  Not on iron for the last couple yrs.   11. Have you had any abnormal  blood loss such as black, tarry or bloody stools, or abnormal vaginal bleeding? No   12. Have you ever had a blood transfusion? No   13. Have you or any of your relatives ever had problems with anesthesia? No   14. Do you have sleep apnea, excessive snoring or daytime drowsiness? YES - did overnight testing, very mild apnea, didn't think CPAP needed   15. Do you have any prosthetic heart valves? No   16. Do you have prosthetic joints? YES - both knees, left shoulder   17. Is there any chance that you may be pregnant? No         HPI:     HPI related to upcoming procedure:     On EGD with Dr. 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  Recommended omeprazole 20mg BID for life, and f/u with Dr. Echols or Davis at [facility].  Dr. Cannon did esophageal manometry.  Rec HALO radiofrequency ablation procedure- will be with Dr. Echols.  May need a second procedure.    Will be going to [place] x 2 wks (with daughter of family she stayed with in '68), then going to [place].      See problem list for active medical problems.  Problems all longstanding and stable, except as noted/documented.  See ROS for pertinent symptoms related to these conditions.                                                                                                                                                          .    MEDICAL HISTORY:     Patient Active Problem List    Diagnosis Date Noted     Shortened ID interval 01/12/2017     Priority: Medium     Short ID interval on 7/16 EKG, done for pre-op, no sx's, nl QRS, no surgical issues.       Cholelithiases 08/04/2016     Priority: Medium     Calculus of gallbladder without cholecystitis without obstruction 07/13/2016     Priority: Medium     MARK (obstructive sleep apnea)(Mild AHI=5) 02/22/2016     Priority: Medium     Acute posthemorrhagic anemia 06/15/2015     Priority: Medium     Physical deconditioning 06/15/2015     Priority: Medium     Constipation 06/15/2015     Priority:  Medium     Arthritis of knee, right 06/10/2015     Priority: Medium     R TKA in 6/15       Other dental procedure status 06/02/2015     Priority: Medium     Ortho recs s/p TKO with dental procedures-   Keflex 500mg- 4 tabs 1-2 hrs prior to dental appts, including cleanings (or amox 500mg - 4 tabs).  If allergic to PCN, then take cleocin 150mg.       Advance Care Planning 08/29/2012     Priority: Medium     Advance Care Planning 6/29/2015: Receipt of ACP document:  Received: Health Care Directive which was witnessed or notarized on 9-.  Document previously scanned on 6-10-15.  Validation form completed and sent to be scanned.  Code Status reflects choices in most recent ACP document.  Confirmed/documented designated decision maker(s).  Added by Sheba Malik RN Advance Care Planning Liaison with [employer]  Patient states has Advance Directive and will bring in a copy to clinic. 8/29/2012 Corin Bacon CMA  Reminded in 11/13- she'll try and bring it in. CW           Gastroesophageal reflux disease with esophagitis 08/29/2012     Priority: Medium     Prilosec twice daily (Barretts esophagus), watches nightly eating as well       Genital herpes 08/25/2011     Priority: Medium     Valtrex prn.  Check creatinine yrly.       Other viral warts 08/26/2010     Priority: Medium     Diagnosis updated by automated process. Provider to review and confirm.       Onychogryposis and onychomycosis 06/08/2009     Priority: Medium     With onycomycosis involving multiple nails.  Pain due to the deformity.  Trouble cutting her nails.       Hyperlipidemia LDL goal <130 06/08/2009     Priority: Medium     Simvastatin 20mg/d           Pain in shoulder 06/05/2009     Priority: Medium     Ongoing left shoulder pain  Jorge Luis Snyder MD.  Replacement surgery in '09- helpful.       Osteopenia 06/05/2009     Priority: Medium     DEXA 6/2006, repeat in 8/11 with no significant change (mild osteopenia).  1/16 Dexa- osteopenia.   FRAX  review below- rx meds not indicated, cont ca/d, exercise.  Osteoporosis Risk Score/FRAX score   [email or web address]  Risk of Hip Fracture: 1% (Significant if >3%)  Risk of Overall Fracture: 13%  (Significant if >20%)          Stallings esophagus 09/30/2008     Priority: Medium     Dr. Aguilera- next upper GI due in ~4/12  GI recs-  F/u TTG was negative to help r/o celiac disease.    F/u endoscopy done with Dr. Aguilera in 5/15- mild Barretts still seen.   Also has hiatal hernia.  On prilosec 20mg 2x/day- should be on this indefinitely.  F/u endoscopy in 3 yrs.         Mixed stress and urge urinary incontinence      Priority: Medium     detrol helping - sx's of urge and stress incontinence        Past Medical History:   Diagnosis Date     Arthritis      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 Aguilera), q3yr f/u, no dysplasia     GERD (gastroesophageal reflux disease)     omeprazole     Hyperlipidemia LDL goal < 160     simvastatin 6/09     Incontinence     detrol helping (stress & urge incontinence)     Incontinence of urine      Osteopenia 6/5/2009     Short UT-normal QRS complex syndrome 9/30/08    cards EKG overread- benign unless pt develops palpitations     Past Surgical History:   Procedure Laterality Date     ARTHROPLASTY KNEE Right 6/10/2015    Procedure: ARTHROPLASTY KNEE;  Surgeon: Jorge Luis Snyder MD;  Location: [facility] OR     C NONSPECIFIC PROCEDURE  2004    ovarian cyst drainage, resection of fibroid tumors     C NONSPECIFIC PROCEDURE  2005    L cataract     C NONSPECIFIC PROCEDURE  6/08    L knee replacement     C RAD RESEC TONSIL/PILLARS  1953     C SHOULDER SURG PROC UNLISTED  2009    lt shoulder arthritis- replacement     C TOTAL KNEE ARTHROPLASTY      left knee total 08     ESOPHAGOSCOPY, GASTROSCOPY, DUODENOSCOPY (EGD), COMBINED N/A 5/21/2015    Procedure: COMBINED ESOPHAGOSCOPY, GASTROSCOPY, DUODENOSCOPY (EGD), BIOPSY SINGLE OR MULTIPLE;  Surgeon: Venkatesh Aguilera MD;   Location:  GI     ESOPHAGOSCOPY, GASTROSCOPY, DUODENOSCOPY (EGD), COMBINED N/A 6/21/2018    Procedure: COMBINED ESOPHAGOSCOPY, GASTROSCOPY, DUODENOSCOPY (EGD), BIOPSY SINGLE OR MULTIPLE;  gastroscopy;  Surgeon: Venkatesh Aguilera MD;  Location:  GI     LAPAROSCOPIC CHOLECYSTECTOMY N/A 8/4/2016    Procedure: LAPAROSCOPIC CHOLECYSTECTOMY;  Surgeon: Venkatesh Ford MD;  Location:  OR     SURGICAL HISTORY OF -   2008    R cataract     Current Outpatient Prescriptions   Medication Sig Dispense Refill     Cholecalciferol (VITAMIN D3 PO) Take by mouth daily       clotrimazole (LOTRIMIN) 1 % cream APPLY TOPICALLY 2 TIMES DAILY 60 g 0     DAILY MULTIVITAMIN PO DAILY       FISH  MG OR CAPS 2 tablets daily       GLUCOSAMINE-CHONDROITIN PO        hydrochlorothiazide (HYDRODIURIL) 25 MG tablet Take 1 tablet (25 mg) by mouth daily 90 tablet 1     omeprazole (PRILOSEC) 20 MG CR capsule Take 1 capsule (20 mg) by mouth 2 times daily 180 capsule 1     polyethylene glycol 0.4%- propylene glycol 0.3% (SYSTANE ULTRA) 0.4-0.3 % SOLN ophthalmic solution Place 1 drop into both eyes At Bedtime       simvastatin (ZOCOR) 20 MG tablet TAKE ONE TABLET BY MOUTH AT BEDTIME 90 tablet 1     tolterodine (DETROL) 2 MG tablet Take 1 tablet (2 mg) by mouth 2 times daily 180 tablet 1     valACYclovir (VALTREX) 500 MG tablet Take 1 tablet (500 mg) by mouth 2 times daily For 3 days total 36 tablet 3     valACYclovir (VALTREX) 500 MG tablet TAKE ONE TABLET BY MOUTH TWICE DAILY  (Patient taking differently: PRN) 36 tablet 0     OTC products: no recent use of OTC ASA, NSAIDS or Steroids other than ibuprofen (likely about a week ago) and no use of herbal medications or other supplements    Allergies   Allergen Reactions     No Known Drug Allergies       Latex Allergy: NO    Social History   Substance Use Topics     Smoking status: Former Smoker     Quit date: 1/1/1985     Smokeless tobacco: Never Used      Comment: 20 yrs smoking '65-'85, 1 PPD  "    Alcohol use 0.0 oz/week     0 Standard drinks or equivalent per week      Comment: Occasional 2 beers weekly. 1-2 glasses wime monthly     History   Drug Use No       REVIEW OF SYSTEMS:   CONSTITUTIONAL: NEGATIVE for fever, chills, change in weight  INTEGUMENTARY/SKIN: NEGATIVE for worrisome rashes, moles or lesions  EYES: NEGATIVE for vision changes or irritation  ENT/MOUTH: NEGATIVE for ear, mouth and throat problems  RESP: NEGATIVE for significant cough or SOB  CV: NEGATIVE for chest pain, palpitations or peripheral edema (other than mild end of day leg swelling)  GI: NEGATIVE for nausea, abdominal pain, heartburn, or change in bowel habits  : NEGATIVE for frequency, dysuria, or hematuria other than chronic mild incontinence (detrol helps)  MUSCULOSKELETAL: NEGATIVE for new significant arthralgias or myalgia  NEURO: NEGATIVE for weakness, dizziness or paresthesias  ENDOCRINE: NEGATIVE for temperature intolerance, skin/hair changes  HEME: NEGATIVE for bleeding problems      EXAM:   /81  Pulse 85  Temp 97.6  F (36.4  C) (Oral)  Ht 5' 9\" (1.753 m)  Wt 281 lb 6.4 oz (127.6 kg)  SpO2 99%  Breastfeeding? No  BMI 41.56 kg/m2    GENERAL APPEARANCE: healthy, alert and no distress     EYES: EOMI, PERRL     HENT: ear canals and TM's normal and nose and mouth without ulcers or lesions     NECK: no adenopathy, no asymmetry, masses, or scars and thyroid normal to palpation     RESP: lungs clear to auscultation - no rales, rhonchi or wheezes     CV: regular rates and rhythm, normal S1 S2, no S3 or S4 and no murmur, click or rub     ABDOMEN:  soft, nontender, no HSM or masses and bowel sounds normal     MS: extremities normal- no gross deformities noted, no evidence of inflammation in joints, FROM in all extremities.     SKIN: no suspicious lesions or rashes     NEURO: Normal strength and tone, sensory exam grossly normal, mentation intact and speech normal     PSYCH: mentation appears normal. and affect " normal/bright     LYMPHATICS: No cervical adenopathy    DIAGNOSTICS:     EKG: appears normal, NSR, normal axis, normal intervals, no acute ST/T changes c/w ischemia, no LVH by voltage criteria.  NY interval a bit better on today's EKG.    Stress Echo in 5/11- normal  Nuclear Cardiolite 8/06 -  WNL    Labs Resulted Today:   Results for orders placed or performed in visit on 08/07/18   Comprehensive metabolic panel (BMP + Alb, Alk Phos, ALT, AST, Total. Bili, TP)   Result Value Ref Range    Sodium 139 133 - 144 mmol/L    Potassium 3.4 3.4 - 5.3 mmol/L    Chloride 102 94 - 109 mmol/L    Carbon Dioxide 30 20 - 32 mmol/L    Anion Gap 7 3 - 14 mmol/L    Glucose 60 (L) 70 - 99 mg/dL    Urea Nitrogen 14 7 - 30 mg/dL    Creatinine 0.76 0.52 - 1.04 mg/dL    GFR Estimate 74 >60 mL/min/1.7m2    GFR Estimate If Black >90 >60 mL/min/1.7m2    Calcium 8.7 8.5 - 10.1 mg/dL    Bilirubin Total 0.4 0.2 - 1.3 mg/dL    Albumin 3.7 3.4 - 5.0 g/dL    Protein Total 7.6 6.8 - 8.8 g/dL    Alkaline Phosphatase 104 40 - 150 U/L    ALT 22 0 - 50 U/L    AST 24 0 - 45 U/L   CBC with platelets   Result Value Ref Range    WBC 7.6 4.0 - 11.0 10e9/L    RBC Count 4.94 3.8 - 5.2 10e12/L    Hemoglobin 15.2 11.7 - 15.7 g/dL    Hematocrit 45.5 35.0 - 47.0 %    MCV 92 78 - 100 fl    MCH 30.8 26.5 - 33.0 pg    MCHC 33.4 31.5 - 36.5 g/dL    RDW 14.3 10.0 - 15.0 %    Platelet Count 245 150 - 450 10e9/L       Recent Labs   Lab Test  05/01/18   1025  03/01/17   1226  07/13/16   1128   06/24/15   1240   06/10/15   0806   HGB   --    --   14.2   --   11.1*   < >  14.0   PLT   --    --   227   --   323   < >  220   INR   --    --    --    --    --    --   0.90   NA  139  142  139   < >   --    < >  Unsatisfactory specimen - hemolyzed   Charge credited     POTASSIUM  3.6  4.7  4.1   < >   --    < >  Unsatisfactory specimen - hemolyzed   Charge credited     CR  0.71  0.69  0.72   < >   --    < >  Unsatisfactory specimen - hemolyzed   Charge credited      < > = values  in this interval not displayed.        IMPRESSION:   Reason for surgery/procedure: Stallings's esophagus with dysplasia  Diagnosis/reason for consult: evaluate for anesthesia    The proposed surgical procedure is considered INTERMEDIATE risk.    REVISED CARDIAC RISK INDEX  The patient has the following serious cardiovascular risks for perioperative complications such as (MI, PE, VFib and 3  AV Block):  No serious cardiac risks  INTERPRETATION: 1 risks: Class II (low risk - 0.9% complication rate)    The patient has the following additional risks for perioperative complications:  No identified additional risks      ICD-10-CM    1. Preop general physical exam Z01.818 EKG 12-lead complete w/read - Clinics     Comprehensive metabolic panel (BMP + Alb, Alk Phos, ALT, AST, Total. Bili, TP)     CBC with platelets   2. Stallings's esophagus with low grade dysplasia K22.710 EKG 12-lead complete w/read - Clinics     Comprehensive metabolic panel (BMP + Alb, Alk Phos, ALT, AST, Total. Bili, TP)     CBC with platelets   3. Morbid obesity (H) E66.01 EKG 12-lead complete w/read - Clinics     Comprehensive metabolic panel (BMP + Alb, Alk Phos, ALT, AST, Total. Bili, TP)   4. Gastroesophageal reflux disease with esophagitis K21.0 Comprehensive metabolic panel (BMP + Alb, Alk Phos, ALT, AST, Total. Bili, TP)   5. MARK (obstructive sleep apnea)(Mild AHI=5) G47.33    6. Shortened GA interval R94.31 EKG 12-lead complete w/read - Clinics     Comprehensive metabolic panel (BMP + Alb, Alk Phos, ALT, AST, Total. Bili, TP)       RECOMMENDATIONS:       --Patient is to take all scheduled medications on the day of surgery EXCEPT for modifications listed below.    Anticoagulant or Antiplatelet Medication Use  NSAIDS: Ibuprofen (Motrin):         Stop 2-3 days prior to surgery        APPROVAL GIVEN to proceed with proposed procedure, without further diagnostic evaluation       Signed Electronically by: Marie Robledo MD    Copy of this  evaluation report is provided to requesting physician.    [place] Preop Guidelines    Revised Cardiac Risk Index

## 2018-08-08 LAB
ALBUMIN SERPL-MCNC: 3.7 G/DL (ref 3.4–5)
ALP SERPL-CCNC: 104 U/L (ref 40–150)
ALT SERPL W P-5'-P-CCNC: 22 U/L (ref 0–50)
ANION GAP SERPL CALCULATED.3IONS-SCNC: 7 MMOL/L (ref 3–14)
AST SERPL W P-5'-P-CCNC: 24 U/L (ref 0–45)
BILIRUB SERPL-MCNC: 0.4 MG/DL (ref 0.2–1.3)
BUN SERPL-MCNC: 14 MG/DL (ref 7–30)
CALCIUM SERPL-MCNC: 8.7 MG/DL (ref 8.5–10.1)
CHLORIDE SERPL-SCNC: 102 MMOL/L (ref 94–109)
CO2 SERPL-SCNC: 30 MMOL/L (ref 20–32)
CREAT SERPL-MCNC: 0.76 MG/DL (ref 0.52–1.04)
GFR SERPL CREATININE-BSD FRML MDRD: 74 ML/MIN/1.7M2
GLUCOSE SERPL-MCNC: 60 MG/DL (ref 70–99)
POTASSIUM SERPL-SCNC: 3.4 MMOL/L (ref 3.4–5.3)
PROT SERPL-MCNC: 7.6 G/DL (ref 6.8–8.8)
SODIUM SERPL-SCNC: 139 MMOL/L (ref 133–144)

## 2018-08-09 NOTE — PROGRESS NOTES
Please send letter below with copy of results.  Thanks! CW    Here are your lab results from your recent visit...  -Your CMP (which includes electrolyte levels, blood sugar levels, and kidney and liver function tests) looks normal other than the lower glucose level.  Usually your glucose has been in the normal range.  -Your CBC labs (which includes blood labs looking for signs of infection or anemia) looks normal with no signs of anemia or infection.    Please let me know if you have any questions.  Best,   Mitchell Robledo MD

## 2018-08-21 ENCOUNTER — TELEPHONE (OUTPATIENT)
Dept: FAMILY MEDICINE | Facility: CLINIC | Age: 73
End: 2018-08-21

## 2018-08-21 ENCOUNTER — TRANSFERRED RECORDS (OUTPATIENT)
Dept: HEALTH INFORMATION MANAGEMENT | Facility: CLINIC | Age: 73
End: 2018-08-21

## 2018-08-21 NOTE — TELEPHONE ENCOUNTER
Reason for Call:  Other     Detailed comments: please send h and p, ekg tracing to fax number:[phone]     Phone Number Patient can be reached at: Please call Nell at [phone]    Best Time: any    Can we leave a detailed message on this number? YES    Call taken on 8/21/2018 at 8:20 AM by Silvia Hills

## 2018-09-19 DIAGNOSIS — E78.5 HYPERLIPIDEMIA LDL GOAL <130: ICD-10-CM

## 2018-09-19 RX ORDER — SIMVASTATIN 20 MG
TABLET ORAL
Start: 2018-09-19

## 2018-09-19 NOTE — TELEPHONE ENCOUNTER
"Denied  Too early; Rx sent 5/1/2018 for 6 months  Bernadette MCCULLOUGH, RN    Requested Prescriptions   Pending Prescriptions Disp Refills     simvastatin (ZOCOR) 20 MG tablet [Pharmacy Med Name: Simvastatin Oral Tablet 20 MG] 90 tablet 0     Sig: TAKE ONE TABLET BY MOUTH AT BEDTIME    Statins Protocol Passed    9/19/2018  7:00 AM       Passed - LDL on file in past 12 months    Recent Labs   Lab Test  05/01/18   1025   LDL  88            Passed - No abnormal creatine kinase in past 12 months    No lab results found.            Passed - Recent (12 mo) or future (30 days) visit within the authorizing provider's specialty    Patient had office visit in the last 12 months or has a visit in the next 30 days with authorizing provider or within the authorizing provider's specialty.  See \"Patient Info\" tab in inbasket, or \"Choose Columns\" in Meds & Orders section of the refill encounter.           Passed - Patient is age 18 or older       Passed - No active pregnancy on record       Passed - No positive pregnancy test in past 12 months        Next 5 appointments (look out 90 days)     Nov 02, 2018  9:30 AM CDT   Office Visit with Marie Robledo MD   [facility] ([facility])    [address] [address] [phone]                  "

## 2018-11-02 ENCOUNTER — OFFICE VISIT (OUTPATIENT)
Dept: FAMILY MEDICINE | Facility: CLINIC | Age: 73
End: 2018-11-02
Payer: COMMERCIAL

## 2018-11-02 VITALS
DIASTOLIC BLOOD PRESSURE: 78 MMHG | BODY MASS INDEX: 41.03 KG/M2 | RESPIRATION RATE: 14 BRPM | OXYGEN SATURATION: 94 % | WEIGHT: 277 LBS | HEIGHT: 69 IN | HEART RATE: 102 BPM | SYSTOLIC BLOOD PRESSURE: 122 MMHG | TEMPERATURE: 97.8 F

## 2018-11-02 DIAGNOSIS — C44.320 SCC (SQUAMOUS CELL CARCINOMA), FACE: ICD-10-CM

## 2018-11-02 DIAGNOSIS — K21.00 GASTROESOPHAGEAL REFLUX DISEASE WITH ESOPHAGITIS: ICD-10-CM

## 2018-11-02 DIAGNOSIS — N39.46 MIXED STRESS AND URGE URINARY INCONTINENCE: ICD-10-CM

## 2018-11-02 DIAGNOSIS — I87.2 STASIS DERMATITIS OF BOTH LEGS: ICD-10-CM

## 2018-11-02 DIAGNOSIS — K22.710 BARRETT'S ESOPHAGUS WITH LOW GRADE DYSPLASIA: Primary | ICD-10-CM

## 2018-11-02 DIAGNOSIS — E78.5 HYPERLIPIDEMIA LDL GOAL <130: ICD-10-CM

## 2018-11-02 DIAGNOSIS — R60.0 BILATERAL LEG EDEMA: ICD-10-CM

## 2018-11-02 PROCEDURE — 99214 OFFICE O/P EST MOD 30 MIN: CPT | Performed by: FAMILY MEDICINE

## 2018-11-02 RX ORDER — SUCRALFATE 1 G/1
1 TABLET ORAL 4 TIMES DAILY
Qty: 40 TABLET | Refills: 1 | COMMUNITY
Start: 2018-11-02 | End: 2021-01-12

## 2018-11-02 RX ORDER — HYDROCHLOROTHIAZIDE 25 MG/1
25 TABLET ORAL DAILY
Qty: 90 TABLET | Refills: 1 | Status: SHIPPED | OUTPATIENT
Start: 2018-11-02 | End: 2019-04-22

## 2018-11-02 RX ORDER — TOLTERODINE TARTRATE 2 MG/1
2 TABLET, EXTENDED RELEASE ORAL 2 TIMES DAILY
Qty: 180 TABLET | Refills: 1 | Status: SHIPPED | OUTPATIENT
Start: 2018-11-02 | End: 2019-04-22

## 2018-11-02 RX ORDER — SIMVASTATIN 20 MG
TABLET ORAL
Qty: 90 TABLET | Refills: 1 | Status: SHIPPED | OUTPATIENT
Start: 2018-11-02 | End: 2019-04-24

## 2018-11-02 NOTE — PATIENT INSTRUCTIONS
PLEASE CALL TO SCHEDULE YOUR MAMMOGRAM  [facility] [phone]  [facility] [place] [phone]  [facility]   [phone]

## 2018-11-02 NOTE — MR AVS SNAPSHOT
"              After Visit Summary   11/2/2018    Hiro Carranza    MRN: [id number]           Patient Information     Date Of Birth          1945        Visit Information        Provider Department      11/2/2018 9:30 AM Marie Robledo MD [facility]        Today's Diagnoses     Stallings's esophagus with low grade dysplasia    -  1    Gastroesophageal reflux disease with esophagitis        SCC (squamous cell carcinoma), face        Hyperlipidemia LDL goal <130        Mixed stress and urge urinary incontinence        Generalized edema          Care Instructions    PLEASE CALL TO SCHEDULE YOUR MAMMOGRAM  [facility] [phone]  [facility] [phone]  [facility]   [phone]              Follow-ups after your visit        Follow-up notes from your care team     Return in about 6 months (around 5/2/2019) for Physical Exam, Fasting labs at appointment.      Who to contact     If you have questions or need follow up information about today's clinic visit or your schedule please contact [facility] directly at [phone].  Normal or non-critical lab and imaging results will be communicated to you by [employer], letter or phone within 4 business days after the clinic has received the results. If you do not hear from us within 7 days, please contact the clinic through [employer] or phone. If you have a critical or abnormal lab result, we will notify you by phone as soon as possible.  Submit refill requests through [employer] or call your pharmacy and they will forward the refill request to us. Please allow 3 business days for your refill to be completed.          Additional Information About Your Visit        [employer] Information     [employer] lets you send messages to your doctor, view your test results, renew your prescriptions, schedule appointments and more. To sign up, go to [email or web address] . Click on \"Log in\" on " "the left side of the screen, which will take you to the Welcome page. Then click on \"Sign up Now\" on the right side of the page.     You will be asked to enter the access code listed below, as well as some personal information. Please follow the directions to create your username and password.     Your access code is: [email or web address]  Expires: 2018  2:23 PM     Your access code will  in 90 days. If you need help or a new code, please call your [facility] or [phone].        Care EveryWhere ID     This is your Care EveryWhere ID. This could be used by other organizations to access your [place] medical records  [id number]        Your Vitals Were     Pulse Temperature Respirations Height Pulse Oximetry Breastfeeding?    102 97.8  F (36.6  C) (Oral) 14 5' 9\" (1.753 m) 94% No    BMI (Body Mass Index)                   40.91 kg/m2            Blood Pressure from Last 3 Encounters:   18 122/78   18 123/81   18 112/81    Weight from Last 3 Encounters:   18 277 lb (125.6 kg)   18 281 lb 6.4 oz (127.6 kg)   18 281 lb 14.4 oz (127.9 kg)              Today, you had the following     No orders found for display         Today's Medication Changes          These changes are accurate as of 18 10:34 AM.  If you have any questions, ask your nurse or doctor.               These medicines have changed or have updated prescriptions.        Dose/Directions    clotrimazole 1 % cream   Commonly known as:  LOTRIMIN   This may have changed:  See the new instructions.   Used for:  Intertriginous candidiasis        APPLY TOPICALLY 2 TIMES DAILY   Quantity:  60 g   Refills:  0            Where to get your medicines      These medications were sent to [facility][id number] - [place] - [address]  [address] [facility] [address]     Phone:  [phone]     hydrochlorothiazide 25 MG tablet    omeprazole 20 MG CR capsule    simvastatin 20 MG tablet    tolterodine 2 MG tablet       "          Primary Care Provider Office Phone # Fax #    Marie Robledo [phone] [address] [address]        Equal Access to Services     LILLIAN GARCIA : Hadwalker gino skelton lissetto Socamilaali, waaxda luqadaha, qaybta kaalmada adeestrella, hany canada laSunshineashley estrella. So [facility] [phone].    ATENCIÓN: Si habla español, tiene a choi disposición servicios gratuitos de asistencia lingüística. Llame al [phone].    We comply with applicable federal civil rights laws and [place] laws. We do not discriminate on the basis of race, color, national origin, age, disability, sex, sexual orientation, or gender identity.            Thank you!     Thank you for choosing [facility]  for your care. Our goal is always to provide you with excellent care. Hearing back from our patients is one way we can continue to improve our services. Please take a few minutes to complete the written survey that you may receive in the mail after your visit with us. Thank you!             Your Updated Medication List - Protect others around you: Learn how to safely use, store and throw away your medicines at www.disposemymeds.org.          This list is accurate as of 11/2/18 10:34 AM.  Always use your most recent med list.                   Brand Name Dispense Instructions for use Diagnosis    ADVIL PO           CARAFATE 1 GM tablet   Generic drug:  sucralfate     40 tablet    Take 1 tablet (1 g) by mouth 4 times daily    Stallings's esophagus with low grade dysplasia, Gastroesophageal reflux disease with esophagitis       clotrimazole 1 % cream    LOTRIMIN    60 g    APPLY TOPICALLY 2 TIMES DAILY    Intertriginous candidiasis       DAILY MULTIVITAMIN PO      DAILY        Fish Oil 500 MG Caps      2 tablets daily        GLUCOSAMINE-CHONDROITIN PO           hydrochlorothiazide 25 MG tablet    HYDRODIURIL    90 tablet    Take 1 tablet (25 mg) by mouth daily    Generalized edema        MEDERMA SPF 30 EX           omeprazole 20 MG CR capsule    priLOSEC    180 capsule    Take 1 capsule (20 mg) by mouth 2 times daily    Stallings's esophagus with low grade dysplasia, Gastroesophageal reflux disease with esophagitis       OTHER MEDICAL SUPPLIES      Scar away silicone scar sheets        polyethylene glycol 0.4%- propylene glycol 0.3% 0.4-0.3 % Soln ophthalmic solution    SYSTANE ULTRA     Place 1 drop into both eyes At Bedtime        simvastatin 20 MG tablet    ZOCOR    90 tablet    TAKE ONE TABLET BY MOUTH AT BEDTIME    Hyperlipidemia LDL goal <130       tolterodine 2 MG tablet    DETROL    180 tablet    Take 1 tablet (2 mg) by mouth 2 times daily    Mixed stress and urge urinary incontinence       valACYclovir 500 MG tablet    VALTREX    36 tablet    Take 1 tablet (500 mg) by mouth 2 times daily For 3 days total    Herpes simplex vulvovaginitis       VITAMIN D3 PO      Take by mouth daily

## 2018-11-02 NOTE — NURSING NOTE
"Chief Complaint   Patient presents with     RECHECK     6 month follow up       Initial /78  Pulse 102  Temp 97.8  F (36.6  C) (Oral)  Resp 14  Ht 5' 9\" (1.753 m)  Wt 277 lb (125.6 kg)  SpO2 94%  Breastfeeding? No  BMI 40.91 kg/m2 Estimated body mass index is 40.91 kg/(m^2) as calculated from the following:    Height as of this encounter: 5' 9\" (1.753 m).    Weight as of this encounter: 277 lb (125.6 kg).  BP completed using cuff size: large    Health Maintenance that is potentially due pending provider review:  Health Maintenance Due   Topic Date Due     PNEUMOCOCCAL (2 of 2 - PPSV23) 07/01/2016     MAMMO SCREEN Q2 YR (SYSTEM ASSIGNED)  02/04/2018     INFLUENZA VACCINE (1) 09/01/2018          flu vaccine declined-will do at pharmacy  Pt states sees [facility] for Mammo[facility]. Will schedule appt.  Numbers or [facility] in AVS-just in case she wants to schedule there  "

## 2018-11-02 NOTE — PROGRESS NOTES
SUBJECTIVE:   Hiro Carranza is a 73 year old female who presents to clinic today for the following health issues:    Chief Complaint   Patient presents with     RECHECK     6 month follow up     --F/u SCC lesion on face-  Pt had MOHS on 10/4/18, wondering if it's healing okay, and okay to use cream on it now?  Seeing Dr. Foley, [facility] in [place].  Squamous cell carcinoma, margins clear on first pass doing MOHS.  Will continue to f/u there.    --F/u Esophageal dysplasia-  H/o Barretts, morphed into dysplasia, so Dr. Aguilera referred her to GI interventionalist.  Esophageal procedure done 8-22-18 at [facility].  Dr. Johnny Echols.    Procedure was an ablation with bx's (HALO 360).  Bx's did not show cancer.    She will be going back in for another f/u txt and bx's - 11/13/18 (HALO 90).    --Social-  [place] trip 8[place]18, also went to [facility].  Stayed with the daughter of the family she stayed with back in the '60s while she was in college.      ----Needs refills on all meds- will send for 6 months- UTD on labs, and GI wants her to continue on 20mg BID for now.  They also have her on some sulcralfate which is difficult for her to swallow.    --GERD- omeprazole 20mg BID as above, no se's, no GERD sx's.  --LE edema- pt feels the hydrochlorothiazide 25mg/d continues to help her LE edema.  No se's, labs UTD.  --Lipids- continues on zocor 20mg/d, no se's.  Labs UTD.  --HSV- continues on prn valtrex.  No se's.  Labs UTD.        Problem list and histories reviewed & adjusted, as indicated.  Additional history: as documented      Patient Active Problem List   Diagnosis     Stallings's esophagus with low grade dysplasia     Mixed stress and urge urinary incontinence     Pain in shoulder     Osteopenia     Onychogryposis and onychomycosis     Hyperlipidemia LDL goal <130     Other viral warts     Genital herpes     Advance Care Planning     Gastroesophageal reflux disease with esophagitis     Other dental procedure status      Arthritis of knee, right     Acute posthemorrhagic anemia     Physical deconditioning     Constipation     MARK (obstructive sleep apnea)(Mild AHI=5)     Calculus of gallbladder without cholecystitis without obstruction     Cholelithiases     Shortened HI interval     Morbid obesity (H)     SCC (squamous cell carcinoma), face     Bilateral leg edema     Past Surgical History:   Procedure Laterality Date     ARTHROPLASTY KNEE Right 6/10/2015    Procedure: ARTHROPLASTY KNEE;  Surgeon: Jorge Luis Snyder MD;  Location:  OR     C NONSPECIFIC PROCEDURE  2004    ovarian cyst drainage, resection of fibroid tumors     C NONSPECIFIC PROCEDURE  2005    L cataract     C NONSPECIFIC PROCEDURE  6/08    L knee replacement     C RAD RESEC TONSIL/PILLARS  1953     C SHOULDER SURG PROC UNLISTED  2009    lt shoulder arthritis- replacement     C TOTAL KNEE ARTHROPLASTY      left knee total 08     ESOPHAGOSCOPY, GASTROSCOPY, DUODENOSCOPY (EGD), COMBINED N/A 5/21/2015    Procedure: COMBINED ESOPHAGOSCOPY, GASTROSCOPY, DUODENOSCOPY (EGD), BIOPSY SINGLE OR MULTIPLE;  Surgeon: Venkatesh Aguilera MD;  Location:  GI     ESOPHAGOSCOPY, GASTROSCOPY, DUODENOSCOPY (EGD), COMBINED N/A 6/21/2018    Procedure: COMBINED ESOPHAGOSCOPY, GASTROSCOPY, DUODENOSCOPY (EGD), BIOPSY SINGLE OR MULTIPLE;  gastroscopy;  Surgeon: Venkatesh Aguilera MD;  Location:  GI     LAPAROSCOPIC CHOLECYSTECTOMY N/A 8/4/2016    Procedure: LAPAROSCOPIC CHOLECYSTECTOMY;  Surgeon: Venkatesh Ford MD;  Location:  OR     SURGICAL HISTORY OF -   2008    R cataract       Social History   Substance Use Topics     Smoking status: Former Smoker     Quit date: 1/1/1985     Smokeless tobacco: Never Used      Comment: 20 yrs smoking '65-'85, 1 PPD     Alcohol use 0.0 oz/week     0 Standard drinks or equivalent per week      Comment: Occasional 2 beers weekly. 1-2 glasses wime monthly     Family History   Problem Relation Age of Onset     C.A.D. Father      triple bypass in his  late 60's     Circulatory Father      Lipids Father      Obesity Father      C.A.D. Maternal Grandfather      death from MI in early 60s     Obesity Maternal Grandfather      C.A.D. Paternal Grandfather      MI in early 70s     Obesity Paternal Grandfather      Hypertension Mother      Arthritis Mother      Lipids Mother      Musculoskeletal Disorder Mother      double knee replacement     Obesity Mother      Cerebrovascular Disease Maternal Grandmother      Obesity Maternal Grandmother      Obesity Paternal Grandmother          Current Outpatient Prescriptions   Medication Sig Dispense Refill     Cholecalciferol (VITAMIN D3 PO) Take by mouth daily       clotrimazole (LOTRIMIN) 1 % cream APPLY TOPICALLY 2 TIMES DAILY (Patient taking differently: APPLY TOPICALLY 2 TIMES DAILY PRN) 60 g 0     DAILY MULTIVITAMIN PO DAILY       FISH  MG OR CAPS 2 tablets daily       GLUCOSAMINE-CHONDROITIN PO        hydrochlorothiazide (HYDRODIURIL) 25 MG tablet Take 1 tablet (25 mg) by mouth daily 90 tablet 1     Ibuprofen (ADVIL PO)        omeprazole (PRILOSEC) 20 MG CR capsule Take 1 capsule (20 mg) by mouth 2 times daily 180 capsule 1     OTHER MEDICAL SUPPLIES Scar away silicone scar sheets       polyethylene glycol 0.4%- propylene glycol 0.3% (SYSTANE ULTRA) 0.4-0.3 % SOLN ophthalmic solution Place 1 drop into both eyes At Bedtime       Scar Treatment Products (MEDERMA SPF 30 EX)        simvastatin (ZOCOR) 20 MG tablet TAKE ONE TABLET BY MOUTH AT BEDTIME 90 tablet 1     sucralfate (CARAFATE) 1 GM tablet Take 1 tablet (1 g) by mouth 4 times daily 40 tablet 1     tolterodine (DETROL) 2 MG tablet Take 1 tablet (2 mg) by mouth 2 times daily 180 tablet 1     valACYclovir (VALTREX) 500 MG tablet Take 1 tablet (500 mg) by mouth 2 times daily For 3 days total 36 tablet 3     Allergies   Allergen Reactions     No Known Drug Allergies      Recent Labs   Lab Test  08/07/18   1448  05/01/18   1025  03/01/17   1226   06/28/16   1454    "12/18/15   1309   LDL   --   88  97   --    --    --   85   HDL   --   65  65   --    --    --   75   TRIG   --   100  107   --    --    --   65   ALT  22   --   19   --   26   < >  21   CR  0.76  0.71  0.69   < >  0.76   < >  0.66   GFRESTIMATED  74  81  83   < >  75   < >  89   GFRESTBLACK  >90  >90  >90  African American GFR Calc     < >  >90   GFR Calc     < >  >90   GFR Calc     POTASSIUM  3.4  3.6  4.7   < >  4.2   < >  4.1   TSH   --    --   1.13   --    --    --    --     < > = values in this interval not displayed.      BP Readings from Last 3 Encounters:   11/02/18 122/78   08/07/18 123/81   06/21/18 112/81    Wt Readings from Last 3 Encounters:   11/02/18 277 lb (125.6 kg)   08/07/18 281 lb 6.4 oz (127.6 kg)   05/01/18 281 lb 14.4 oz (127.9 kg)            Labs reviewed in EPIC    Reviewed and updated as needed this visit by clinical staff  Tobacco  Allergies  Meds  Problems  Med Hx  Surg Hx  Fam Hx  Soc Hx        Reviewed and updated as needed this visit by Provider  Allergies  Meds  Problems         ROS:  Constitutional, HEENT, cardiovascular, pulmonary, gi and gu systems are negative, except as otherwise noted.    OBJECTIVE:     /78  Pulse 102  Temp 97.8  F (36.6  C) (Oral)  Resp 14  Ht 5' 9\" (1.753 m)  Wt 277 lb (125.6 kg)  SpO2 94%  Breastfeeding? No  BMI 40.91 kg/m2  Body mass index is 40.91 kg/(m^2).  GENERAL APPEARANCE: healthy, alert and no distress     EYES: PERRL, sclera clear     HENT: nose and mouth without ulcers or lesions     NECK: no adenopathy, no asymmetry, masses, or scars and thyroid normal to palpation     RESP: lungs clear to auscultation - no rales, rhonchi or wheezes     CV: regular rates and rhythm, normal S1 S2, no S3 or S4 and no murmur, click or rub      Abdomen: soft, nontender, no HSM or masses and bowel sounds normal     Ext: warm, dry, mild erythema, trace/1+ edema      Psych: full range affect, normal speech and " grooming, judgement and insight intact     Diagnostic Test Results:  Results for orders placed or performed in visit on 08/07/18   Comprehensive metabolic panel (BMP + Alb, Alk Phos, ALT, AST, Total. Bili, TP)   Result Value Ref Range    Sodium 139 133 - 144 mmol/L    Potassium 3.4 3.4 - 5.3 mmol/L    Chloride 102 94 - 109 mmol/L    Carbon Dioxide 30 20 - 32 mmol/L    Anion Gap 7 3 - 14 mmol/L    Glucose 60 (L) 70 - 99 mg/dL    Urea Nitrogen 14 7 - 30 mg/dL    Creatinine 0.76 0.52 - 1.04 mg/dL    GFR Estimate 74 >60 mL/min/1.7m2    GFR Estimate If Black >90 >60 mL/min/1.7m2    Calcium 8.7 8.5 - 10.1 mg/dL    Bilirubin Total 0.4 0.2 - 1.3 mg/dL    Albumin 3.7 3.4 - 5.0 g/dL    Protein Total 7.6 6.8 - 8.8 g/dL    Alkaline Phosphatase 104 40 - 150 U/L    ALT 22 0 - 50 U/L    AST 24 0 - 45 U/L   CBC with platelets   Result Value Ref Range    WBC 7.6 4.0 - 11.0 10e9/L    RBC Count 4.94 3.8 - 5.2 10e12/L    Hemoglobin 15.2 11.7 - 15.7 g/dL    Hematocrit 45.5 35.0 - 47.0 %    MCV 92 78 - 100 fl    MCH 30.8 26.5 - 33.0 pg    MCHC 33.4 31.5 - 36.5 g/dL    RDW 14.3 10.0 - 15.0 %    Platelet Count 245 150 - 450 10e9/L       ASSESSMENT/PLAN:       ICD-10-CM    1. Stallings's esophagus with low grade dysplasia K22.710 omeprazole (PRILOSEC) 20 MG CR capsule     sucralfate (CARAFATE) 1 GM tablet   2. Gastroesophageal reflux disease with esophagitis K21.0 omeprazole (PRILOSEC) 20 MG CR capsule     sucralfate (CARAFATE) 1 GM tablet   3. SCC (squamous cell carcinoma), face C44.320 Scar Treatment Products (MEDERMA SPF 30 EX)     OTHER MEDICAL SUPPLIES   4. Hyperlipidemia LDL goal <130 E78.5 simvastatin (ZOCOR) 20 MG tablet   5. Mixed stress and urge urinary incontinence N39.46 tolterodine (DETROL) 2 MG tablet   6. Bilateral leg edema R60.0 hydrochlorothiazide (HYDRODIURIL) 25 MG tablet   7. Stasis dermatitis of both legs I87.2 hydrochlorothiazide (HYDRODIURIL) 25 MG tablet     GERD/Stallings's- Recent HALO procedure with GI, going back  for second HALO soon.  Will cont f/u with GI, and cont omeprazole 20mg 2x/day.  Risks and benefits of medication(s) including potential side effects reviewed with patient.  Questions answered.      SCC on R upper cheek- recent MOHS with Derm, will cont f/u with them.  Inspected scar on R upper cheek today, which appears to be healing well.  Okay to start topical txts now (from derm).    Lipids- doing well on simvastatin, no se's, will continue.  Labs UTD.    Mixed incontinence- doing well on detrol, no concerns, no se's.  Will continue.    Bilateral leg edema/stasis dermatitis- pt reports she has steroid cream to use on her lower legs from derm.    Hydrochlorothiazide has also helped reduce her lower extremity edema for the last few yrs, so will continue that for now.    Labs UTD.      Marie Robledo MD  [facility]          Return in about 6 months (around 5/2/2019) for Physical Exam, Fasting labs at appointment.

## 2018-11-04 PROBLEM — R60.0 BILATERAL LEG EDEMA: Status: ACTIVE | Noted: 2018-11-04

## 2018-11-13 ENCOUNTER — TRANSFERRED RECORDS (OUTPATIENT)
Dept: HEALTH INFORMATION MANAGEMENT | Facility: CLINIC | Age: 73
End: 2018-11-13

## 2018-12-06 ENCOUNTER — TRANSFERRED RECORDS (OUTPATIENT)
Dept: HEALTH INFORMATION MANAGEMENT | Facility: CLINIC | Age: 73
End: 2018-12-06

## 2018-12-26 DIAGNOSIS — B37.2 INTERTRIGINOUS CANDIDIASIS: ICD-10-CM

## 2018-12-27 RX ORDER — CLOTRIMAZOLE 1 %
CREAM (GRAM) TOPICAL
Qty: 60 G | Refills: 0 | Status: SHIPPED | OUTPATIENT
Start: 2018-12-27 | End: 2020-06-30

## 2018-12-27 NOTE — TELEPHONE ENCOUNTER
Routing refill request to provider for review/approval because:  Drug not on the G refill protocol   Bernadette MCCULLOUGH RN    Requested Prescriptions   Pending Prescriptions Disp Refills     clotrimazole (LOTRIMIN) 1 % external cream [Pharmacy Med Name: Clotrimazole External Cream 1 %] 60 g 0     Sig: APPLY TOPICALLY 2 TIMES DAILY    There is no refill protocol information for this order

## 2019-04-22 DIAGNOSIS — K21.00 GASTROESOPHAGEAL REFLUX DISEASE WITH ESOPHAGITIS: ICD-10-CM

## 2019-04-22 DIAGNOSIS — I87.2 STASIS DERMATITIS OF BOTH LEGS: ICD-10-CM

## 2019-04-22 DIAGNOSIS — K22.710 BARRETT'S ESOPHAGUS WITH LOW GRADE DYSPLASIA: ICD-10-CM

## 2019-04-22 DIAGNOSIS — N39.46 MIXED STRESS AND URGE URINARY INCONTINENCE: ICD-10-CM

## 2019-04-22 DIAGNOSIS — R60.0 BILATERAL LEG EDEMA: ICD-10-CM

## 2019-04-23 RX ORDER — TOLTERODINE TARTRATE 2 MG/1
TABLET, EXTENDED RELEASE ORAL
Qty: 60 TABLET | Refills: 0 | Status: SHIPPED | OUTPATIENT
Start: 2019-04-23 | End: 2019-05-07

## 2019-04-23 RX ORDER — HYDROCHLOROTHIAZIDE 25 MG/1
TABLET ORAL
Qty: 30 TABLET | Refills: 0 | Status: SHIPPED | OUTPATIENT
Start: 2019-04-23 | End: 2019-05-07

## 2019-04-23 NOTE — TELEPHONE ENCOUNTER
"Medication is being filled for 1 time refill only due to:  Patient needs to be seen because due for physical.    Note from 11/2/18 OV:  Return in about 6 months (around 5/2/2019) for Physical Exam, Fasting labs at appointment.    Aarti Black RN    Requested Prescriptions   Signed Prescriptions Disp Refills    omeprazole (PRILOSEC) 20 MG DR capsule 60 capsule 0     Sig: TAKE ONE CAPSULE BY MOUTH TWICE DAILY       PPI Protocol Passed - 4/22/2019  7:01 AM        Passed - Not on Clopidogrel (unless Pantoprazole ordered)        Passed - No diagnosis of osteoporosis on record        Passed - Recent (12 mo) or future (30 days) visit within the authorizing provider's specialty     Patient had office visit in the last 12 months or has a visit in the next 30 days with authorizing provider or within the authorizing provider's specialty.  See \"Patient Info\" tab in inbasket, or \"Choose Columns\" in Meds & Orders section of the refill encounter.              Passed - Medication is active on med list        Passed - Patient is age 18 or older        Passed - No active pregnacy on record        Passed - No positive pregnancy test in past 12 months       hydrochlorothiazide (HYDRODIURIL) 25 MG tablet 30 tablet 0     Sig: TAKE ONE TABLET BY MOUTH ONCE DAILY       Diuretics (Including Combos) Protocol Passed - 4/22/2019  7:01 AM        Passed - Blood pressure under 140/90 in past 12 months     BP Readings from Last 3 Encounters:   11/02/18 122/78   08/07/18 123/81   06/21/18 112/81                 Passed - Recent (12 mo) or future (30 days) visit within the authorizing provider's specialty     Patient had office visit in the last 12 months or has a visit in the next 30 days with authorizing provider or within the authorizing provider's specialty.  See \"Patient Info\" tab in inbasket, or \"Choose Columns\" in Meds & Orders section of the refill encounter.              Passed - Medication is active on med list        Passed - Patient is " "age 18 or older        Passed - No active pregancy on record        Passed - Normal serum creatinine on file in past 12 months     Recent Labs   Lab Test 08/07/18  1448   CR 0.76              Passed - Normal serum potassium on file in past 12 months     Recent Labs   Lab Test 08/07/18  1448   POTASSIUM 3.4                    Passed - Normal serum sodium on file in past 12 months     Recent Labs   Lab Test 08/07/18  1448                 Passed - No positive pregnancy test in past 12 months       tolterodine (DETROL) 2 MG tablet 60 tablet 0     Sig: TAKE ONE TABLET BY MOUTH TWICE DAILY       Muscarinic Antagonists (Urinary Incontinence Agents) Passed - 4/22/2019  7:01 AM        Passed - Recent (12 mo) or future (30 days) visit within the authorizing provider's specialty     Patient had office visit in the last 12 months or has a visit in the next 30 days with authorizing provider or within the authorizing provider's specialty.  See \"Patient Info\" tab in inbasket, or \"Choose Columns\" in Meds & Orders section of the refill encounter.              Passed - Patient does not have a diagnosis of glaucoma on the problem list     If glaucoma diagnosis is new, refer refill to physician.          Passed - Medication is active on med list        Passed - Patient is 18 years of age or older               "

## 2019-04-24 DIAGNOSIS — E78.5 HYPERLIPIDEMIA LDL GOAL <130: ICD-10-CM

## 2019-04-24 RX ORDER — SIMVASTATIN 20 MG
TABLET ORAL
Qty: 30 TABLET | Refills: 0 | Status: SHIPPED | OUTPATIENT
Start: 2019-04-24 | End: 2019-05-07

## 2019-04-24 NOTE — TELEPHONE ENCOUNTER
"Medication is being filled for 1 time refill only due to:  Patient needs to be seen because due for physical and fasting labs.     Aarti Black RN    Requested Prescriptions   Signed Prescriptions Disp Refills    simvastatin (ZOCOR) 20 MG tablet 30 tablet 0     Sig: TAKE ONE TABLET BY MOUTH AT BEDTIME       Statins Protocol Passed - 4/24/2019  7:01 AM        Passed - LDL on file in past 12 months     Recent Labs   Lab Test 05/01/18  1025   LDL 88             Passed - No abnormal creatine kinase in past 12 months     No lab results found.             Passed - Recent (12 mo) or future (30 days) visit within the authorizing provider's specialty     Patient had office visit in the last 12 months or has a visit in the next 30 days with authorizing provider or within the authorizing provider's specialty.  See \"Patient Info\" tab in inbasket, or \"Choose Columns\" in Meds & Orders section of the refill encounter.              Passed - Medication is active on med list        Passed - Patient is age 18 or older        Passed - No active pregnancy on record        Passed - No positive pregnancy test in past 12 months            "

## 2019-05-07 ENCOUNTER — OFFICE VISIT (OUTPATIENT)
Dept: FAMILY MEDICINE | Facility: CLINIC | Age: 74
End: 2019-05-07
Payer: COMMERCIAL

## 2019-05-07 VITALS
WEIGHT: 278 LBS | DIASTOLIC BLOOD PRESSURE: 84 MMHG | HEIGHT: 69 IN | HEART RATE: 80 BPM | BODY MASS INDEX: 41.18 KG/M2 | RESPIRATION RATE: 12 BRPM | TEMPERATURE: 98 F | SYSTOLIC BLOOD PRESSURE: 130 MMHG

## 2019-05-07 DIAGNOSIS — K21.00 GASTROESOPHAGEAL REFLUX DISEASE WITH ESOPHAGITIS: ICD-10-CM

## 2019-05-07 DIAGNOSIS — M85.89 OSTEOPENIA OF MULTIPLE SITES: ICD-10-CM

## 2019-05-07 DIAGNOSIS — N39.46 MIXED STRESS AND URGE URINARY INCONTINENCE: ICD-10-CM

## 2019-05-07 DIAGNOSIS — R60.0 BILATERAL LEG EDEMA: ICD-10-CM

## 2019-05-07 DIAGNOSIS — Z00.00 ENCOUNTER FOR ROUTINE ADULT HEALTH EXAMINATION WITHOUT ABNORMAL FINDINGS: Primary | ICD-10-CM

## 2019-05-07 DIAGNOSIS — K22.710 BARRETT'S ESOPHAGUS WITH LOW GRADE DYSPLASIA: ICD-10-CM

## 2019-05-07 DIAGNOSIS — E78.5 HYPERLIPIDEMIA LDL GOAL <130: ICD-10-CM

## 2019-05-07 DIAGNOSIS — C44.320 SCC (SQUAMOUS CELL CARCINOMA), FACE: ICD-10-CM

## 2019-05-07 DIAGNOSIS — I87.2 STASIS DERMATITIS OF BOTH LEGS: ICD-10-CM

## 2019-05-07 DIAGNOSIS — E66.01 MORBID OBESITY (H): ICD-10-CM

## 2019-05-07 LAB
ANION GAP SERPL CALCULATED.3IONS-SCNC: 6 MMOL/L (ref 3–14)
BUN SERPL-MCNC: 13 MG/DL (ref 7–30)
CALCIUM SERPL-MCNC: 9.2 MG/DL (ref 8.5–10.1)
CHLORIDE SERPL-SCNC: 106 MMOL/L (ref 94–109)
CHOLEST SERPL-MCNC: 180 MG/DL
CO2 SERPL-SCNC: 26 MMOL/L (ref 20–32)
CREAT SERPL-MCNC: 0.76 MG/DL (ref 0.52–1.04)
CREAT UR-MCNC: 59 MG/DL
GFR SERPL CREATININE-BSD FRML MDRD: 78 ML/MIN/{1.73_M2}
GLUCOSE SERPL-MCNC: 89 MG/DL (ref 70–99)
HDLC SERPL-MCNC: 76 MG/DL
LDLC SERPL CALC-MCNC: 86 MG/DL
MICROALBUMIN UR-MCNC: <5 MG/L
MICROALBUMIN/CREAT UR: NORMAL MG/G CR (ref 0–25)
NONHDLC SERPL-MCNC: 104 MG/DL
POTASSIUM SERPL-SCNC: 3.8 MMOL/L (ref 3.4–5.3)
SODIUM SERPL-SCNC: 138 MMOL/L (ref 133–144)
TRIGL SERPL-MCNC: 91 MG/DL

## 2019-05-07 PROCEDURE — 82043 UR ALBUMIN QUANTITATIVE: CPT | Performed by: FAMILY MEDICINE

## 2019-05-07 PROCEDURE — 36415 COLL VENOUS BLD VENIPUNCTURE: CPT | Performed by: FAMILY MEDICINE

## 2019-05-07 PROCEDURE — G0439 PPPS, SUBSEQ VISIT: HCPCS | Performed by: FAMILY MEDICINE

## 2019-05-07 PROCEDURE — 99213 OFFICE O/P EST LOW 20 MIN: CPT | Mod: 25 | Performed by: FAMILY MEDICINE

## 2019-05-07 PROCEDURE — 80061 LIPID PANEL: CPT | Performed by: FAMILY MEDICINE

## 2019-05-07 PROCEDURE — 80048 BASIC METABOLIC PNL TOTAL CA: CPT | Performed by: FAMILY MEDICINE

## 2019-05-07 RX ORDER — TOLTERODINE TARTRATE 2 MG/1
2 TABLET, EXTENDED RELEASE ORAL 2 TIMES DAILY
Qty: 180 TABLET | Refills: 1 | Status: SHIPPED | OUTPATIENT
Start: 2019-05-07 | End: 2020-01-06

## 2019-05-07 RX ORDER — HYDROCHLOROTHIAZIDE 25 MG/1
25 TABLET ORAL DAILY
Qty: 90 TABLET | Refills: 1 | Status: SHIPPED | OUTPATIENT
Start: 2019-05-07 | End: 2019-11-06

## 2019-05-07 RX ORDER — SIMVASTATIN 20 MG
20 TABLET ORAL AT BEDTIME
Qty: 90 TABLET | Refills: 3 | Status: SHIPPED | OUTPATIENT
Start: 2019-05-07 | End: 2020-02-19

## 2019-05-07 ASSESSMENT — ACTIVITIES OF DAILY LIVING (ADL): CURRENT_FUNCTION: NO ASSISTANCE NEEDED

## 2019-05-07 ASSESSMENT — MIFFLIN-ST. JEOR: SCORE: 1830.38

## 2019-05-07 NOTE — PATIENT INSTRUCTIONS
Patient Education   Personalized Prevention Plan  You are due for the preventive services outlined below.  Your care team is available to assist you in scheduling these services.  If you have already completed any of these items, please share that information with your care team to update in your medical record.  Health Maintenance Due   Topic Date Due     PHQ-2  01/01/2019     Annual Wellness Visit  05/01/2019     FALL RISK ASSESSMENT  05/01/2019       Understanding [employer] MyPlate  The USDA (U.S. Department of Agriculture) has guidelines to help you make healthy food choices. These are called MyPlate. MyPlate shows the food groups that make up healthy meals using the image of a place setting. Before you eat, think about the healthiest choices for what to put onto your plate or into your cup or bowl. To learn more about building a healthy plate, visit www.[employer] solar systemsplate.gov.    The food groups    Fruits. Any fruit or 100% fruit juice counts as part of the Fruit Group. Fruits may be fresh, canned, frozen, or dried, and may be whole, cut-up, or pureed. Make half your plate fruits and vegetables.    Vegetables. Any vegetable or 100% vegetable juice counts as a member of the Vegetable Group. Vegetables may be fresh, frozen, canned, or dried. They can be served raw or cooked and may be whole, cut-up, or mashed. Make half your plate fruits and vegetables.    Grains. All foods made from grains are part of the Grains Group. These include wheat, rice, oats, cornmeal, and barley such as bread, pasta, oatmeal, cereal, tortillas, and grits. Grains should be no more than a quarter of your plate. At least half of your grains should be whole grains.    Protein. This group includes meat, poultry, seafood, beans and peas, eggs, processed soy products (like tofu), nuts (including nut butters), and seeds. Make protein choices no more than a quarter of your plate. Meat and poultry choices should be lean or low fat.    Dairy. All fluid  milk products and foods made from milk that contain calcium, like yogurt and cheese, are part of the Dairy Group. (Foods that have little calcium, such as cream, butter, and cream cheese, are not part of the group.) Most dairy choices should be low-fat or fat-free.    Oils. These are fats that are liquid at room temperature. They include canola, corn, olive, soybean, and sunflower oil. Foods that are mainly oil include mayonnaise, certain salad dressings, and soft margarines. You should have only 5 to 7 teaspoons of oils a day. You probably already get this much from the food you eat.  Date Last Reviewed: 8/1/2017 2000-2018 [employer]. [address] [place] [address], [place] [address]. All rights reserved. This information is not intended as a substitute for professional medical care. Always follow your healthcare professional's instructions.          Signs of Hearing Loss     Hearing much better with one ear can be a sign of hearing loss.     Hearing loss is a problem shared by many people. In fact, it is one of the most common health conditions, particularly as people age. Most people over age 65 have some hearing loss, and by age 80, almost everyone does. Because hearing loss usually occurs slowly over the years, you may not realize your hearing ability has gotten worse.  Have your hearing checked  Contact your healthcare provider if you:    Have to strain to hear normal conversation    Have to watch other people s faces very carefully to follow what they re saying    Need to ask people to repeat what they ve said    Often misunderstand what people are saying    Turn the volume of the television or radio up so high that others complain    Feel that people are mumbling when they re talking to you    Find that the effort to hear leaves you feeling tired and irritated    Notice, when using the phone, that you hear better with one ear than the other  Date Last Reviewed: 12/1/2016 2000-2018 [employer]  [employer]. [address] [place] [address]. All rights reserved. This information is not intended as a substitute for professional medical care. Always follow your healthcare professional's instructions.          Urinary Incontinence, Female (Adult)  Urinary incontinence means loss of control of the bladder. This problem affects many women, especially as they get older. If you have incontinence, you may be embarrassed to ask for help. But know that this problem can be treated.  Types of Incontinence  There are different types of incontinence. Two of the main types are described here. You can have more than one type.    Stress incontinence. With this type, urine leaks when pressure (stress) is put on the bladder. This may happen when you cough, sneeze, or laugh. Stress incontinence most often occurs because the pelvic floor muscles that support the bladder and urethra are weak. This can happen after pregnancy and vaginal childbirth or a hysterectomy. It can also be due to excess body weight or hormone changes.    Urge incontinence (also called overactive bladder). With this type, a sudden urge to urinate is felt often. This may happen even though there may not be much urine in the bladder. The need to urinate often during the night is common. Urge incontinence most often occurs because of bladder spasms. This may be due to bladder irritation or infection. Damage to bladder nerves or pelvic muscles, constipation, and certain medicines can also lead to urge incontinence.  Treatment of urinary incontinence depends on the cause. Further evaluation is needed to find the type you have. This will likely include an exam and certain tests. Based on the results, you and your healthcare provider can then plan treatment. Until a diagnosis is made, the home care tips below can help relieve symptoms.  Home care    Do pelvic floor muscle exercises, if they are prescribed. The pelvic floor muscles help support the  bladder and urethra. Many women find that their symptoms improve when doing special exercises that strengthen these muscles. To do the exercises contract the muscles you would use to stop your stream of urine, but do this when you re not urinating. Hold for 10 seconds, then relax. Repeat 10 to 20 times in a row, at least 3 times a day. Your provider may give you other instructions for how to do the exercises and how often.    Keep a bladder diary. This helps track how often and how much you urinate over a set period of time. Bring this diary with you to your next visit with the provider. The information can help your provider learn more about your bladder problem.    Lose weight, if advised to by your provider. Excess weight puts pressure on the bladder. Your provider can help you create a weight-loss plan that s right for you. This may include exercising more and making certain diet changes.    Don't consume foods and drinks that may irritate the bladder. These can include alcohol and caffeinated drinks.    Quit smoking. Smoking and other tobacco use can lead to chronic cough that strains the pelvic floor muscles. Smoking may also damage the bladder and urethra. Talk with your provider about treatments or methods you can use to quit smoking.    If drinking large amounts of fluid causes you to have symptoms, you may be advised to limit your fluid intake. You may also be advised to drink most of your fluids during the day and to limit fluids at night.    If you re worried about urine leakage or accidents, you may wear absorbent pads to catch urine. Change the pads often. This helps reduce discomfort. It may also reduce the risk of skin or bladder infections.  Follow-up care  Follow up with your healthcare provider, or as directed. It may take some to find the right treatment for your problem. Your treatment plan may include special therapies or medicines. Certain procedures or surgery may also be options. Be sure to  discuss any questions you have with your provider.  When to seek medical advice  Call the healthcare provider right away if any of these occur:    Fever of 100.4 F (38 C) or higher, or as directed by your provider    Bladder pain or fullness    Abdominal swelling    Nausea or vomiting    Back pain    Weakness, dizziness or fainting  Date Last Reviewed: 10/1/2017    6615-3726 [employer]. [address] [place] [address]. All rights reserved. This information is not intended as a substitute for professional medical care. Always follow your healthcare professional's instructions.           Patient Education   Kegel Exercises  What are Kegel exercises?  Kegels are exercises that tighten and release the pelvic muscles. These muscles wrap around both the anus and urethra (the tube that carries urine out of the body).  To find these muscles, try to stop and start the flow of urine while using the toilet.  Kegel exercises may:    Give you greater bladder control (stop or prevent urine from leaking).    Give you greater bowel control.    Increase pleasure during sex.    Ease childbirth for pregnant women.    Help men regain bladder control after prostate cancer treatment.  How can I test my muscle strength?  As you urinate (pass water), try to stop your stream of urine: Tighten the muscle around your urethra. If you can stop the stream, then you have good muscle control.  To maintain good control, you need to exercise these muscles regularly.  If you cannot stop your stream, Kegels can help you improve your muscle control.  How do I do Kegel exercises?  1. Squeeze and lift the muscles around the urethra. (You should feel the muscles lift near the urethra or tighten in your rectum.)  2. Hold them tight as you count to 5 or 10.  3. Then, slowly relax these muscles as you count to 5 or 10.  4. Repeat five times.  Do these exercises three times a day: Five times in the morning, five times in the afternoon  and five times at night.  Where to exercise  You may do the exercises anywhere and anytime. For instance:    At red traffic lights.    During TV commercials.    During coffee breaks.    While waiting for the bus.    At the grocery store.    When brushing your teeth.    During sex (for women).  Common mistakes  Don't use the muscles in your stomach, legs or buttocks. Your leg and buttock muscles should not move during these exercises.  To check your stomach muscles, put your hand on your stomach when you do your Kegels. If you feel your stomach move, then you are using the wrong muscles.  Can these exercises hurt me?  No, these exercises cannot harm you in any way. You should find them relaxing and easy.  Back or stomach pain may mean you are using your stomach muscles.  If you get headaches and your chest muscles are tense, you are likely holding your breath. Try to breathe normally during your Kegels.  When will I notice a change?  If you have weak bladder control, you will notice a change after four to six weeks of daily exercise. After three months, you will see an even bigger difference.  Make these exercises a habit: Tighten the muscles when you walk, before you cough, as you stand up and on the way to the bathroom.  For informational purposes only. Not to replace the advice of your health care provider. Copyright   1981, 2009 [place] [employer] [facility]. All rights reserved. [employer] [id number]   REV 06/16.        FOOT CARE NURSES  If you are interested in having a foot care nurse come out to your   home, please call one of these contacts for more information:  [facility]  [phone] [facility]  [phone]   [facility]  [phone]  [place] [phone]  [place]   [facility]  [phone]  At [facility] [phone]

## 2019-05-07 NOTE — LETTER
May 21, 2019      Hiro Carranza  [address] [place] [id number]        Dear ,    Here are your lab results from your recent visit...     -Your urine albumin level (which is the urine test that can signal signs of early chronic kidney disease if elevated to >30) looks low which is very good.   -Your basic metabolic panel (which includes electrolyte levels, blood sugar level and kidney function tests) looks completely normal.   -Your cholesterol panel continues to look great on the simvastatin with a low LDL (the bad cholesterol) and a high HDL (the good cholesterol).     Resulted Orders   Lipid panel reflex to direct LDL Fasting   Result Value Ref Range    Cholesterol 180 <200 mg/dL    Triglycerides 91 <150 mg/dL      Comment:      Fasting specimen    HDL Cholesterol 76 >49 mg/dL    LDL Cholesterol Calculated 86 <100 mg/dL      Comment:      Desirable:       <100 mg/dl    Non HDL Cholesterol 104 <130 mg/dL   Basic metabolic panel  (Ca, Cl, CO2, Creat, Gluc, K, Na, BUN)   Result Value Ref Range    Sodium 138 133 - 144 mmol/L    Potassium 3.8 3.4 - 5.3 mmol/L    Chloride 106 94 - 109 mmol/L    Carbon Dioxide 26 20 - 32 mmol/L    Anion Gap 6 3 - 14 mmol/L    Glucose 89 70 - 99 mg/dL      Comment:      Fasting specimen    Urea Nitrogen 13 7 - 30 mg/dL    Creatinine 0.76 0.52 - 1.04 mg/dL    GFR Estimate 78 >60 mL/min/[1.73_m2]      Comment:      Non  GFR Calc  Starting 12/18/2018, serum creatinine based estimated GFR (eGFR) will be   calculated using the Chronic Kidney Disease Epidemiology Collaboration   (CKD-EPI) equation.      GFR Estimate If Black >90 >60 mL/min/[1.73_m2]      Comment:       GFR Calc  Starting 12/18/2018, serum creatinine based estimated GFR (eGFR) will be   calculated using the Chronic Kidney Disease Epidemiology Collaboration   (CKD-EPI) equation.      Calcium 9.2 8.5 - 10.1 mg/dL   Albumin Random Urine Quantitative with Creat Ratio   Result Value Ref  Range    Creatinine Urine 59 mg/dL    Albumin Urine mg/L <5 mg/L    Albumin Urine mg/g Cr Unable to calculate due to low value 0 - 25 mg/g Cr       If you have any questions or concerns, please call the clinic at the number listed above.       Sincerely,        Marie Robledo MD/ms

## 2019-05-07 NOTE — PROGRESS NOTES
"SUBJECTIVE:   Hiro Carranza is a 73 year old female who presents for Preventive Visit.  Are you in the first 12 months of your Medicare coverage?  No    Healthy Habits:     In general, how would you rate your overall health?  Good    Frequency of exercise:  2-3 days/week    Duration of exercise:  Less than 15 minutes    Do you usually eat at least 4 servings of fruit and vegetables a day, include whole grains    & fiber and avoid regularly eating high fat or \"junk\" foods?  No    Taking medications regularly:  Yes    Medication side effects:  None    Ability to successfully perform activities of daily living:  No assistance needed    Home Safety:  Throw rugs in the hallway and lack of grab bars in the bathroom    Hearing Impairment:  Need to ask people to speak up or repeat themselves    In the past 6 months, have you been bothered by leaking of urine? Yes    In general, how would you rate your overall mental or emotional health?  Good      PHQ-2 Total Score: 0    Additional concerns today:  No    Doing a bit better with vegetables and treadmill.  Got the treadmill out - working up on it now.  Now doing 10-12 minutes, ~3x/wk.  Going 1.5 miles/hr.     just started on diet things...  Friend/chiro- electromagnetic this/that, rec diet.  No sugar or grains, no milk.  Mostly nuts and meat, eggs, fruits and vegetables- cavemen diet.  She doesn't love meat- does like grains- rice, barley, popcorn.    Stallings's  Following with Dr. Echols and Dr. Cannon.  Has third procedure coming up next week.  Last time looked much better, just a bit left- 6 months ago.  Still on PPI twice daily- long-term.    SCC- following with dermatology in [place].  Dr. Alexandria Rodriguez.  Will likely see her annually at this point.    Due for fasting labs today.  Is fasting.    Meds- needs refills.    Bladder- more issues with sneezing/coughing leakage.  Taking detrol 2mg twice daily.  Wearing pads.    Do you feel safe in your environment? Yes    Do " you have a Health Care Directive? No: Advance care planning reviewed with patient; information given to patient to review.      Fall risk     click delete button to remove this line now  Cognitive Screening   1) Repeat 3 items (Leader, Season, Table)    2) Clock draw: NORMAL  3) 3 item recall: Recalls 3 objects  Results: 3 items recalled: COGNITIVE IMPAIRMENT LESS LIKELY    Mini-CogTM Copyright RALPH Ramsay. Licensed by the author for use in [facility]; reprinted with permission (soob@[facility]). All rights reserved.      Do you have sleep apnea, excessive snoring or daytime drowsiness?: no    Reviewed and updated as needed this visit by clinical staff  Tobacco  Allergies  Meds         Reviewed and updated as needed this visit by Provider        Social History     Tobacco Use     Smoking status: Former Smoker     Last attempt to quit: 1985     Years since quittin.3     Smokeless tobacco: Never Used     Tobacco comment: 20 yrs smoking 65-, 1 PPD   Substance Use Topics     Alcohol use: Yes     Alcohol/week: 0.0 oz     Comment: Occasional 2 beers weekly. 1-2 glasses wime monthly     If you drink alcohol do you typically have >3 drinks per day or >7 drinks per week? No    Alcohol Use 2019   Prescreen: >3 drinks/day or >7 drinks/week? No   Prescreen: >3 drinks/day or >7 drinks/week? -   No flowsheet data found.      Current providers sharing in care for this patient include:   Patient Care Team:  Marie Robledo MD as PCP - General  Marie Robledo MD as Assigned PCP    The following health maintenance items are reviewed in Epic and correct as of today:  Health Maintenance   Topic Date Due     PHQ-2  2019     MEDICARE ANNUAL WELLNESS VISIT  2019     FALL RISK ASSESSMENT  2019     INFLUENZA VACCINE (Season Ended) 2019     MAMMO SCREEN Q2 YR (SYSTEM ASSIGNED)  2020     ADVANCE DIRECTIVE PLANNING Q5 YRS  2020     LIPID SCREEN Q5 YR FEMALE (SYSTEM  ASSIGNED)  05/01/2023     DTAP/TDAP/TD IMMUNIZATION (3 - Td) 05/01/2028     COLONOSCOPY Q10 YR  06/21/2028     DEXA SCAN SCREENING (SYSTEM ASSIGNED)  Completed     ZOSTER IMMUNIZATION  Completed     HEPATITIS C SCREENING  Completed     IPV IMMUNIZATION  Aged Out     MENINGITIS IMMUNIZATION  Aged Out       Lab work is in process  Labs reviewed in EPIC  BP Readings from Last 3 Encounters:   05/07/19 130/84   11/02/18 122/78   08/07/18 123/81    Wt Readings from Last 3 Encounters:   05/07/19 126.1 kg (278 lb)   11/02/18 125.6 kg (277 lb)   08/07/18 127.6 kg (281 lb 6.4 oz)                  Patient Active Problem List   Diagnosis     Stallings's esophagus with low grade dysplasia     Mixed stress and urge urinary incontinence     Pain in shoulder     Osteopenia     Onychogryposis and onychomycosis     Hyperlipidemia LDL goal <130     Other viral warts     Genital herpes     Advance Care Planning     Gastroesophageal reflux disease with esophagitis     Other dental procedure status     Arthritis of knee, right     Acute posthemorrhagic anemia     Physical deconditioning     Constipation     MARK (obstructive sleep apnea)(Mild AHI=5)     Calculus of gallbladder without cholecystitis without obstruction     Cholelithiases     Shortened NM interval     Morbid obesity (H)     SCC (squamous cell carcinoma), face     Bilateral leg edema     Past Surgical History:   Procedure Laterality Date     ARTHROPLASTY KNEE Right 6/10/2015    Procedure: ARTHROPLASTY KNEE;  Surgeon: Jorge Luis Snyder MD;  Location: [facility] OR     C NONSPECIFIC PROCEDURE  2004    ovarian cyst drainage, resection of fibroid tumors     C NONSPECIFIC PROCEDURE  2005    L cataract     C NONSPECIFIC PROCEDURE  6/08    L knee replacement     C RAD RESEC TONSIL/PILLARS  1953     C SHOULDER SURG PROC UNLISTED  2009    lt shoulder arthritis- replacement     C TOTAL KNEE ARTHROPLASTY      left knee total 08     ESOPHAGOSCOPY, GASTROSCOPY, DUODENOSCOPY (EGD), COMBINED  N/A 2015    Procedure: COMBINED ESOPHAGOSCOPY, GASTROSCOPY, DUODENOSCOPY (EGD), BIOPSY SINGLE OR MULTIPLE;  Surgeon: Venkatesh Aguilera MD;  Location:  GI     ESOPHAGOSCOPY, GASTROSCOPY, DUODENOSCOPY (EGD), COMBINED N/A 2018    Procedure: COMBINED ESOPHAGOSCOPY, GASTROSCOPY, DUODENOSCOPY (EGD), BIOPSY SINGLE OR MULTIPLE;  gastroscopy;  Surgeon: Venkatesh Aguilera MD;  Location:  GI     LAPAROSCOPIC CHOLECYSTECTOMY N/A 2016    Procedure: LAPAROSCOPIC CHOLECYSTECTOMY;  Surgeon: Venkatesh Ford MD;  Location:  OR     SURGICAL HISTORY OF -       R cataract       Social History     Tobacco Use     Smoking status: Former Smoker     Last attempt to quit: 1985     Years since quittin.4     Smokeless tobacco: Never Used     Tobacco comment: 20 yrs smoking '65-, 1 PPD   Substance Use Topics     Alcohol use: Yes     Alcohol/week: 0.0 oz     Comment: Occasional 2 beers weekly. 1-2 glasses wime monthly     Family History   Problem Relation Age of Onset     C.A.D. Father         triple bypass in his late 60's     Circulatory Father      Lipids Father      Obesity Father      C.A.D. Maternal Grandfather         death from MI in early 60s     Obesity Maternal Grandfather      C.A.D. Paternal Grandfather         MI in early 70s     Obesity Paternal Grandfather      Hypertension Mother      Arthritis Mother      Lipids Mother      Musculoskeletal Disorder Mother         double knee replacement     Obesity Mother      Cerebrovascular Disease Maternal Grandmother      Obesity Maternal Grandmother      Obesity Paternal Grandmother          Current Outpatient Medications   Medication Sig Dispense Refill     Cholecalciferol (VITAMIN D3 PO) Take by mouth daily       clotrimazole (LOTRIMIN) 1 % external cream APPLY TOPICALLY 2 TIMES DAILY 60 g 0     DAILY MULTIVITAMIN PO DAILY       FISH  MG OR CAPS 2 tablets daily       GLUCOSAMINE-CHONDROITIN PO        hydrochlorothiazide (HYDRODIURIL) 25 MG tablet  "Take 1 tablet (25 mg) by mouth daily 90 tablet 1     omeprazole (PRILOSEC) 20 MG DR capsule Take 1 capsule (20 mg) by mouth 2 times daily 180 capsule 1     polyethylene glycol 0.4%- propylene glycol 0.3% (SYSTANE ULTRA) 0.4-0.3 % SOLN ophthalmic solution Place 1 drop into both eyes At Bedtime       Scar Treatment Products (MEDERMA SPF 30 EX)        simvastatin (ZOCOR) 20 MG tablet Take 1 tablet (20 mg) by mouth At Bedtime 90 tablet 3     sucralfate (CARAFATE) 1 GM tablet Take 1 tablet (1 g) by mouth 4 times daily 40 tablet 1     tolterodine (DETROL) 2 MG tablet Take 1 tablet (2 mg) by mouth 2 times daily 180 tablet 1     valACYclovir (VALTREX) 500 MG tablet Take 1 tablet (500 mg) by mouth 2 times daily For 3 days total 36 tablet 3     clotrimazole (LOTRIMIN) 1 % cream APPLY TOPICALLY 2 TIMES DAILY (Patient taking differently: APPLY TOPICALLY 2 TIMES DAILY PRN) 60 g 0     Ibuprofen (ADVIL PO)        OTHER MEDICAL SUPPLIES Scar away silicone scar sheets       Allergies   Allergen Reactions     No Known Drug Allergies         Review of Systems  Constitutional, HEENT, cardiovascular, pulmonary, gi and gu systems are negative, except as otherwise noted.    OBJECTIVE:   /84   Pulse 80   Temp 98  F (36.7  C) (Oral)   Resp 12   Ht 1.753 m (5' 9\")   Wt 126.1 kg (278 lb)   BMI 41.05 kg/m   Estimated body mass index is 41.05 kg/m  as calculated from the following:    Height as of this encounter: 1.753 m (5' 9\").    Weight as of this encounter: 126.1 kg (278 lb).  Physical Exam  GENERAL APPEARANCE: healthy, alert and no distress  EYES: Eyes grossly normal to inspection, PERRL and conjunctivae and sclerae normal  HENT: ear canals and TM's normal, nose and mouth without ulcers or lesions, oropharynx clear and oral mucous membranes moist  NECK: no adenopathy, no asymmetry, masses, or scars and thyroid normal to palpation  RESP: lungs clear to auscultation - no rales, rhonchi or wheezes  BREAST: normal without masses, " tenderness or nipple discharge and no palpable axillary masses or adenopathy  CV: regular rate and rhythm, normal S1 S2, no S3 or S4, no murmur, click or rub, no peripheral edema and peripheral pulses strong  ABDOMEN: soft, nontender, no hepatosplenomegaly, no masses and bowel sounds normal  MS: no musculoskeletal defects are noted and gait is age appropriate without ataxia  SKIN: no suspicious lesions or rashes  NEURO: Normal strength and tone, sensory exam grossly normal, mentation intact and speech normal  PSYCH: mentation appears normal and affect normal/bright    Diagnostic Test Results:  Results for orders placed or performed in visit on 05/07/19   Lipid panel reflex to direct LDL Fasting   Result Value Ref Range    Cholesterol 180 <200 mg/dL    Triglycerides 91 <150 mg/dL    HDL Cholesterol 76 >49 mg/dL    LDL Cholesterol Calculated 86 <100 mg/dL    Non HDL Cholesterol 104 <130 mg/dL   Basic metabolic panel  (Ca, Cl, CO2, Creat, Gluc, K, Na, BUN)   Result Value Ref Range    Sodium 138 133 - 144 mmol/L    Potassium 3.8 3.4 - 5.3 mmol/L    Chloride 106 94 - 109 mmol/L    Carbon Dioxide 26 20 - 32 mmol/L    Anion Gap 6 3 - 14 mmol/L    Glucose 89 70 - 99 mg/dL    Urea Nitrogen 13 7 - 30 mg/dL    Creatinine 0.76 0.52 - 1.04 mg/dL    GFR Estimate 78 >60 mL/min/[1.73_m2]    GFR Estimate If Black >90 >60 mL/min/[1.73_m2]    Calcium 9.2 8.5 - 10.1 mg/dL   Albumin Random Urine Quantitative with Creat Ratio   Result Value Ref Range    Creatinine Urine 59 mg/dL    Albumin Urine mg/L <5 mg/L    Albumin Urine mg/g Cr Unable to calculate due to low value 0 - 25 mg/g Cr       ASSESSMENT / PLAN:       ICD-10-CM    1. Encounter for routine adult health examination without abnormal findings Z00.00    2. Hyperlipidemia LDL goal <130 E78.5 Lipid panel reflex to direct LDL Fasting     simvastatin (ZOCOR) 20 MG tablet   3. Osteopenia of multiple sites M85.89 DX Hip/Pelvis/Spine     CANCELED: DX Hip/Pelvis/Spine   4. SCC (squamous  cell carcinoma), face C44.320    5. Stallings's esophagus with low grade dysplasia K22.710 omeprazole (PRILOSEC) 20 MG DR capsule   6. Mixed stress and urge urinary incontinence N39.46 tolterodine (DETROL) 2 MG tablet   7. Bilateral leg edema R60.0 Basic metabolic panel  (Ca, Cl, CO2, Creat, Gluc, K, Na, BUN)     Albumin Random Urine Quantitative with Creat Ratio     hydrochlorothiazide (HYDRODIURIL) 25 MG tablet   8. Stasis dermatitis of both legs I87.2 hydrochlorothiazide (HYDRODIURIL) 25 MG tablet   9. Gastroesophageal reflux disease with esophagitis K21.0 omeprazole (PRILOSEC) 20 MG DR capsule   10. Morbid obesity (H) E66.01 Lipid panel reflex to direct LDL Fasting     Basic metabolic panel  (Ca, Cl, CO2, Creat, Gluc, K, Na, BUN)     Albumin Random Urine Quantitative with Creat Ratio     CPE- Discussed diet, calcium and exercise. Thin prep pap was not done.  Eyes and Teeth or UTD or recommended f/u.  No immunizations needed today.  See orders below for tests ordered and screening needed.   Chronic issues fairly stable, will continue current medications.  Labs as above.  Exception is stress incontinence, those sx's are worsening a bit.  Will cont detrol BID, but strongly recommended kegels exercises and gave her tips on how to do the correctly.  Discussed need to continue exercises long-term, and to not expect results for 4-6 weeks after starting.  Cont f/u with GI, dermatology.  F/u q6 months, sooner if issues.  Refills sent.        End of Life Planning:  Patient currently has an advanced directive: No.  I have verified the patient's ablity to prepare an advanced directive/make health care decisions.  Literature was provided to assist patient in preparing an advanced directive.    COUNSELING:  Reviewed preventive health counseling, as reflected in patient instructions    BP Readings from Last 1 Encounters:   05/07/19 130/84     Estimated body mass index is 41.05 kg/m  as calculated from the following:    Height as  "of this encounter: 1.753 m (5' 9\").    Weight as of this encounter: 126.1 kg (278 lb).    BP Screening:   Last 3 BP Readings:    BP Readings from Last 3 Encounters:   05/07/19 130/84   11/02/18 122/78   08/07/18 123/81       The following was recommended to the patient:  Re-screen BP within a year and recommended lifestyle modifications  Weight management plan: Discussed healthy diet and exercise guidelines     reports that she quit smoking about 34 years ago. She has never used smokeless tobacco.      Appropriate preventive services were discussed with this patient, including applicable screening as appropriate for cardiovascular disease, diabetes, osteopenia/osteoporosis, and glaucoma.  As appropriate for age/gender, discussed screening for colorectal cancer, prostate cancer, breast cancer, and cervical cancer. Checklist reviewing preventive services available has been given to the patient.    Reviewed patients plan of care and provided an AVS. The Basic Care Plan (routine screening as documented in Health Maintenance) for Hiro meets the Care Plan requirement. This Care Plan has been established and reviewed with the Patient.    Counseling Resources:  ATP IV Guidelines  Pooled Cohorts Equation Calculator  Breast Cancer Risk Calculator  FRAX Risk Assessment  ICSI Preventive Guidelines  Dietary Guidelines for Americans, 2010  [employer]'s MyPlate  ASA Prophylaxis  Lung CA Screening    Marie Robledo MD  [facility]    Identified Health Risks:    The patient was counseled and encouraged to consider modifying their diet and eating habits. She was provided with information on recommended healthy diet options.  The patient was provided with written information regarding signs of hearing loss.  Information on urinary incontinence and treatment options given to patient.  The patient was counseled and encouraged to consider modifying their diet and eating habits. She was provided with information on recommended " healthy diet options.  The patient was provided with written information regarding signs of hearing loss.  Information on urinary incontinence and treatment options given to patient.

## 2019-05-14 ENCOUNTER — TRANSFERRED RECORDS (OUTPATIENT)
Dept: HEALTH INFORMATION MANAGEMENT | Facility: CLINIC | Age: 74
End: 2019-05-14

## 2019-05-21 NOTE — RESULT ENCOUNTER NOTE
Please send letter below with copy of results.  Thanks! CW    Here are your lab results from your recent visit...  -Your urine albumin level (which is the urine test that can signal signs of early chronic kidney disease if elevated to >30) looks low which is very good.  -Your basic metabolic panel (which includes electrolyte levels, blood sugar level and kidney function tests) looks completely normal.  -Your cholesterol panel continues to look great on the simvastatin with a low LDL (the bad cholesterol) and a high HDL (the good cholesterol).       Please let me know if you have any questions.  Best,   Mitchell Robledo MD

## 2019-05-30 DIAGNOSIS — A60.04 HERPES SIMPLEX VULVOVAGINITIS: ICD-10-CM

## 2019-05-30 RX ORDER — VALACYCLOVIR HYDROCHLORIDE 500 MG/1
500 TABLET, FILM COATED ORAL 2 TIMES DAILY
Qty: 36 TABLET | Refills: 1 | Status: SHIPPED | OUTPATIENT
Start: 2019-05-30 | End: 2019-10-19

## 2019-05-30 NOTE — TELEPHONE ENCOUNTER
"Prescription approved per [facility] Refill Protocol.  Bernadette MCCULLOUGH RN    Last Written Prescription Date:  5/1/2018  Last Fill Quantity: 36,  # refills: 3   Last office visit: 5/7/2019 with prescribing provider:     Future Office Visit:    Requested Prescriptions   Pending Prescriptions Disp Refills     valACYclovir (VALTREX) 500 MG tablet [Pharmacy Med Name: valACYclovir HCl Oral Tablet 500 MG] 36 tablet 2     Sig: Take 1 tablet (500 mg) by mouth 2 times daily For 3 days total       Antivirals for Herpes Protocol Passed - 5/30/2019 11:37 AM        Passed - Patient is age 12 or older        Passed - Recent (12 mo) or future (30 days) visit within the authorizing provider's specialty     Patient had office visit in the last 12 months or has a visit in the next 30 days with authorizing provider or within the authorizing provider's specialty.  See \"Patient Info\" tab in inbasket, or \"Choose Columns\" in Meds & Orders section of the refill encounter.              Passed - Medication is active on med list        Passed - Normal serum creatinine on file in past 12 months     Recent Labs   Lab Test 05/07/19  1115   CR 0.76               "

## 2019-06-07 ENCOUNTER — HOSPITAL ENCOUNTER (OUTPATIENT)
Dept: BONE DENSITY | Facility: CLINIC | Age: 74
Discharge: HOME | End: 2019-06-07
Attending: FAMILY MEDICINE | Admitting: FAMILY MEDICINE
Payer: COMMERCIAL

## 2019-06-07 DIAGNOSIS — M85.89 OSTEOPENIA OF MULTIPLE SITES: ICD-10-CM

## 2019-06-07 PROCEDURE — 77080 DXA BONE DENSITY AXIAL: CPT

## 2019-10-19 DIAGNOSIS — A60.04 HERPES SIMPLEX VULVOVAGINITIS: ICD-10-CM

## 2019-10-21 RX ORDER — VALACYCLOVIR HYDROCHLORIDE 500 MG/1
TABLET, FILM COATED ORAL
Qty: 36 TABLET | Refills: 0 | Status: SHIPPED | OUTPATIENT
Start: 2019-10-21 | End: 2020-01-07

## 2019-10-21 NOTE — TELEPHONE ENCOUNTER
"Prescription approved per [facility] Refill Protocol.  Due for appt Nov 2019  Bernadette MCCULLOUGH RN    Last Written Prescription Date:  5/30/2019  Last Fill Quantity: 36,  # refills: 1   Last office visit: 5/7/2019 with prescribing provider:     Future Office Visit:    Requested Prescriptions   Pending Prescriptions Disp Refills     valACYclovir (VALTREX) 500 MG tablet [Pharmacy Med Name: valACYclovir HCl Oral Tablet 500 MG] 36 tablet 0     Sig: TAKE ONE TABLET BY MOUTH TWICE DAILY for 3 days       Antivirals for Herpes Protocol Passed - 10/19/2019  7:00 PM        Passed - Patient is age 12 or older        Passed - Recent (12 mo) or future (30 days) visit within the authorizing provider's specialty     Patient has had an office visit with the authorizing provider or a provider within the authorizing providers department within the previous 12 mos or has a future within next 30 days. See \"Patient Info\" tab in inbasket, or \"Choose Columns\" in Meds & Orders section of the refill encounter.              Passed - Medication is active on med list        Passed - Normal serum creatinine on file in past 12 months     Recent Labs   Lab Test 05/07/19  1115   CR 0.76             "

## 2019-11-06 DIAGNOSIS — K22.710 BARRETT'S ESOPHAGUS WITH LOW GRADE DYSPLASIA: ICD-10-CM

## 2019-11-06 DIAGNOSIS — R60.0 BILATERAL LEG EDEMA: ICD-10-CM

## 2019-11-06 DIAGNOSIS — K21.00 GASTROESOPHAGEAL REFLUX DISEASE WITH ESOPHAGITIS: ICD-10-CM

## 2019-11-06 DIAGNOSIS — I87.2 STASIS DERMATITIS OF BOTH LEGS: ICD-10-CM

## 2019-11-06 RX ORDER — HYDROCHLOROTHIAZIDE 25 MG/1
TABLET ORAL
Qty: 30 TABLET | Refills: 0 | Status: SHIPPED | OUTPATIENT
Start: 2019-11-06 | End: 2020-01-06

## 2019-11-06 NOTE — TELEPHONE ENCOUNTER
"Medication is being filled for 1 time refill only due to:  Patient needs to be seen because due for follow up.   Note from 5/7/19 OV:   Return in about 6 months (around 11/7/2019) for Annual Wellness Visit, Routine Visit/Chronic Cares.            Aarti Black RN    Requested Prescriptions   Signed Prescriptions Disp Refills    omeprazole (PRILOSEC) 20 MG DR capsule 60 capsule 0     Sig: TAKE ONE CAPSULE BY MOUTH TWICE DAILY       PPI Protocol Passed - 11/6/2019  8:22 AM        Passed - Not on Clopidogrel (unless Pantoprazole ordered)        Passed - No diagnosis of osteoporosis on record        Passed - Recent (12 mo) or future (30 days) visit within the authorizing provider's specialty     Patient has had an office visit with the authorizing provider or a provider within the authorizing providers department within the previous 12 mos or has a future within next 30 days. See \"Patient Info\" tab in inbasket, or \"Choose Columns\" in Meds & Orders section of the refill encounter.              Passed - Medication is active on med list        Passed - Patient is age 18 or older        Passed - No active pregnacy on record        Passed - No positive pregnancy test in past 12 months       hydrochlorothiazide (HYDRODIURIL) 25 MG tablet 30 tablet 0     Sig: TAKE ONE TABLET BY MOUTH ONCE DAILY       Diuretics (Including Combos) Protocol Passed - 11/6/2019  8:22 AM        Passed - Blood pressure under 140/90 in past 12 months     BP Readings from Last 3 Encounters:   05/07/19 130/84   11/02/18 122/78   08/07/18 123/81                 Passed - Recent (12 mo) or future (30 days) visit within the authorizing provider's specialty     Patient has had an office visit with the authorizing provider or a provider within the authorizing providers department within the previous 12 mos or has a future within next 30 days. See \"Patient Info\" tab in inbasket, or \"Choose Columns\" in Meds & Orders section of the refill encounter.              " Passed - Medication is active on med list        Passed - Patient is age 18 or older        Passed - No active pregancy on record        Passed - Normal serum creatinine on file in past 12 months     Recent Labs   Lab Test 05/07/19  1115   CR 0.76              Passed - Normal serum potassium on file in past 12 months     Recent Labs   Lab Test 05/07/19  1115   POTASSIUM 3.8                    Passed - Normal serum sodium on file in past 12 months     Recent Labs   Lab Test 05/07/19  1115                 Passed - No positive pregnancy test in past 12 months

## 2019-11-06 NOTE — TELEPHONE ENCOUNTER
Hydrochlorothiazide  Last Written Prescription Date:  5/7/2019  Last Fill Quantity: 90,  # refills: 1   Last office visit: 5/7/2019 with prescribing provider:  Venkat Wiggins Office Visit:      Omeprazole  Last Written Prescription Date:  5/7/2019  Last Fill Quantity: 180,  # refills: 1   Last office visit: 5/7/2019 with prescribing provider:  Mikie Wiggins Office Visit:

## 2020-01-04 DIAGNOSIS — K21.00 GASTROESOPHAGEAL REFLUX DISEASE WITH ESOPHAGITIS: ICD-10-CM

## 2020-01-04 DIAGNOSIS — R60.0 BILATERAL LEG EDEMA: ICD-10-CM

## 2020-01-04 DIAGNOSIS — I87.2 STASIS DERMATITIS OF BOTH LEGS: ICD-10-CM

## 2020-01-04 DIAGNOSIS — N39.46 MIXED STRESS AND URGE URINARY INCONTINENCE: ICD-10-CM

## 2020-01-04 DIAGNOSIS — K22.710 BARRETT'S ESOPHAGUS WITH LOW GRADE DYSPLASIA: ICD-10-CM

## 2020-01-06 RX ORDER — HYDROCHLOROTHIAZIDE 25 MG/1
TABLET ORAL
Qty: 30 TABLET | Refills: 0 | Status: SHIPPED | OUTPATIENT
Start: 2020-01-06 | End: 2020-01-07

## 2020-01-06 RX ORDER — TOLTERODINE TARTRATE 2 MG/1
TABLET, EXTENDED RELEASE ORAL
Qty: 60 TABLET | Refills: 0 | Status: SHIPPED | OUTPATIENT
Start: 2020-01-06 | End: 2020-01-07

## 2020-01-06 NOTE — TELEPHONE ENCOUNTER
"Prescription approved per [facility] Refill Protocol.  Bernadette MCCULLOUGH RN    Last Written Prescription Date:    Last Fill Quantity: ,  # refills:    Last office visit: 5/7/2019 with prescribing provider:     Future Office Visit:   Next 5 appointments (look out 90 days)    Jan 07, 2020  2:00 PM CST  Office Visit with Marie Robledo MD  [facility] ([facility]) [address] [address] [phone]         Requested Prescriptions   Pending Prescriptions Disp Refills     omeprazole (PRILOSEC) 20 MG DR capsule [Pharmacy Med Name: Omeprazole Oral Capsule Delayed Release 20 MG] 60 capsule 0     Sig: TAKE ONE CAPSULE BY MOUTH TWICE DAILY       PPI Protocol Passed - 1/4/2020  8:58 PM        Passed - Not on Clopidogrel (unless Pantoprazole ordered)        Passed - No diagnosis of osteoporosis on record        Passed - Recent (12 mo) or future (30 days) visit within the authorizing provider's specialty     Patient has had an office visit with the authorizing provider or a provider within the authorizing providers department within the previous 12 mos or has a future within next 30 days. See \"Patient Info\" tab in inbasket, or \"Choose Columns\" in Meds & Orders section of the refill encounter.              Passed - Medication is active on med list        Passed - Patient is age 18 or older        Passed - No active pregnacy on record        Passed - No positive pregnancy test in past 12 months        hydrochlorothiazide (HYDRODIURIL) 25 MG tablet [Pharmacy Med Name: hydroCHLOROthiazide Oral Tablet 25 MG] 30 tablet 0     Sig: TAKE ONE TABLET BY MOUTH ONCE DAILY       Diuretics (Including Combos) Protocol Passed - 1/4/2020  8:58 PM        Passed - Blood pressure under 140/90 in past 12 months     BP Readings from Last 3 Encounters:   05/07/19 130/84   11/02/18 122/78   08/07/18 123/81                 Passed - Recent (12 mo) or future (30 days) visit within the authorizing provider's " "specialty     Patient has had an office visit with the authorizing provider or a provider within the authorizing providers department within the previous 12 mos or has a future within next 30 days. See \"Patient Info\" tab in inbasket, or \"Choose Columns\" in Meds & Orders section of the refill encounter.              Passed - Medication is active on med list        Passed - Patient is age 18 or older        Passed - No active pregancy on record        Passed - Normal serum creatinine on file in past 12 months     Recent Labs   Lab Test 05/07/19  1115   CR 0.76              Passed - Normal serum potassium on file in past 12 months     Recent Labs   Lab Test 05/07/19  1115   POTASSIUM 3.8                    Passed - Normal serum sodium on file in past 12 months     Recent Labs   Lab Test 05/07/19  1115                 Passed - No positive pregnancy test in past 12 months        tolterodine (DETROL) 2 MG tablet [Pharmacy Med Name: Tolterodine Tartrate Oral Tablet 2 MG] 180 tablet 0     Sig: TAKE ONE TABLET BY MOUTH TWICE DAILY       Muscarinic Antagonists (Urinary Incontinence Agents) Passed - 1/4/2020  8:58 PM        Passed - Recent (12 mo) or future (30 days) visit within the authorizing provider's specialty     Patient has had an office visit with the authorizing provider or a provider within the authorizing providers department within the previous 12 mos or has a future within next 30 days. See \"Patient Info\" tab in inbasket, or \"Choose Columns\" in Meds & Orders section of the refill encounter.              Passed - Patient does not have a diagnosis of glaucoma on the problem list     If glaucoma diagnosis is new, refer refill to physician.          Passed - Medication is active on med list        Passed - Patient is 18 years of age or older        "

## 2020-01-07 ENCOUNTER — OFFICE VISIT (OUTPATIENT)
Dept: FAMILY MEDICINE | Facility: CLINIC | Age: 75
End: 2020-01-07
Payer: COMMERCIAL

## 2020-01-07 DIAGNOSIS — N39.46 MIXED STRESS AND URGE URINARY INCONTINENCE: ICD-10-CM

## 2020-01-07 DIAGNOSIS — I10 ESSENTIAL HYPERTENSION: Primary | ICD-10-CM

## 2020-01-07 DIAGNOSIS — E78.5 HYPERLIPIDEMIA LDL GOAL <130: ICD-10-CM

## 2020-01-07 DIAGNOSIS — R60.0 BILATERAL LEG EDEMA: ICD-10-CM

## 2020-01-07 DIAGNOSIS — E66.01 MORBID OBESITY (H): ICD-10-CM

## 2020-01-07 DIAGNOSIS — K22.710 BARRETT'S ESOPHAGUS WITH LOW GRADE DYSPLASIA: ICD-10-CM

## 2020-01-07 DIAGNOSIS — A60.04 HERPES SIMPLEX VULVOVAGINITIS: ICD-10-CM

## 2020-01-07 DIAGNOSIS — I87.2 STASIS DERMATITIS OF BOTH LEGS: ICD-10-CM

## 2020-01-07 DIAGNOSIS — K21.00 GASTROESOPHAGEAL REFLUX DISEASE WITH ESOPHAGITIS: ICD-10-CM

## 2020-01-07 PROCEDURE — 99214 OFFICE O/P EST MOD 30 MIN: CPT | Performed by: FAMILY MEDICINE

## 2020-01-07 RX ORDER — VALACYCLOVIR HYDROCHLORIDE 500 MG/1
TABLET, FILM COATED ORAL
Qty: 36 TABLET | Refills: 3 | Status: SHIPPED | OUTPATIENT
Start: 2020-01-07 | End: 2020-06-30

## 2020-01-07 RX ORDER — HYDROCHLOROTHIAZIDE 25 MG/1
25 TABLET ORAL DAILY
Qty: 90 TABLET | Refills: 1 | Status: SHIPPED | OUTPATIENT
Start: 2020-01-07 | End: 2020-03-23

## 2020-01-07 RX ORDER — SIMVASTATIN 20 MG
20 TABLET ORAL AT BEDTIME
Qty: 90 TABLET | Refills: 3 | Status: CANCELLED | OUTPATIENT
Start: 2020-01-07

## 2020-01-07 RX ORDER — TOLTERODINE TARTRATE 2 MG/1
2 TABLET, EXTENDED RELEASE ORAL 2 TIMES DAILY
Qty: 180 TABLET | Refills: 1 | Status: SHIPPED | OUTPATIENT
Start: 2020-01-07 | End: 2020-03-23

## 2020-01-07 ASSESSMENT — MIFFLIN-ST. JEOR: SCORE: 1807.24

## 2020-01-07 NOTE — NURSING NOTE
"Chief Complaint   Patient presents with     Recheck Medication     initial BP (!) 159/82 (BP Location: Left arm, Cuff Size: Adult Large)   Pulse 95   Temp 97.6  F (36.4  C) (Oral)   Resp 16   Ht 1.753 m (5' 9\")   Wt 124.3 kg (274 lb)   SpO2 96%   BMI 40.46 kg/m   Estimated body mass index is 40.46 kg/m  as calculated from the following:    Height as of this encounter: 1.753 m (5' 9\").    Weight as of this encounter: 124.3 kg (274 lb).  BP completed using cuff size: large.  L  arm      Health Maintenance that is potentially due pending provider review:  NONE    n/a    Tramaine Kam ma  "

## 2020-01-07 NOTE — PROGRESS NOTES
Subjective   Hiro Carranza is a 74 year old female who presents to clinic today for the following health issues:    HPI   Recheck medication    Updates-   --Recently saw optho, who saw a little clouding on her lens, no need for intervention at this point.  --Mammogram- will schedule soon.  --Diet/exercise- doing treadmill x 10 minutes, and working up fasting and longer.      GI f/u- Stallings's- had third procedure, declared her 'healed', said to continue BID PPI- long-term.  Dr. Echols.  Unable to see procedure notes from 5/19.  She can't recall f/u.  Pt will call to ask about f/u recs.    LE edema-   H/o stasis dermatitis- trying to do compression stockings daily.  Dermatologist said to not use the steroid cream for too long, so she's back to trying to wear the stockings more.    Still on hydrochlorothiazide 25mg/d for it.  Labs UTD in 5/19.  BP recheck 144/78    Incontinence- maybe a bit worse, even on the twice daily detrol.  Kegals- she forgets to do them- did figure out 'how' to do them.  Does watch tv., buzzes past commercials, but could do them when she starts to buzz past them.    Dermatology- now seeing them yearly.  Nothing seen at last visit.  Upper lobe ear lesion was benign, likely from lying on that side all the time.  Got a pillow with a hole in the middle, which is odd, but helping some.    In her R ear, feels like she has almost a blister.  Does okay with the hearing aids in her ears, but feels a  Decreasing in size.  Has happened on/off over the years.          Patient Active Problem List   Diagnosis     Stallings's esophagus with low grade dysplasia     Mixed stress and urge urinary incontinence     Pain in shoulder     Osteopenia     Onychogryposis and onychomycosis     Hyperlipidemia LDL goal <130     Other viral warts     Genital herpes     Advance Care Planning     Gastroesophageal reflux disease with esophagitis     Other dental procedure status     Arthritis of knee, right     Acute  posthemorrhagic anemia     Physical deconditioning     Constipation     MARK (obstructive sleep apnea)(Mild AHI=5)     Calculus of gallbladder without cholecystitis without obstruction     Cholelithiases     Shortened KS interval     Morbid obesity (H)     SCC (squamous cell carcinoma), face     Bilateral leg edema     Essential hypertension     Past Surgical History:   Procedure Laterality Date     ARTHROPLASTY KNEE Right 6/10/2015    Procedure: ARTHROPLASTY KNEE;  Surgeon: Jorge Luis Snyder MD;  Location:  OR     C NONSPECIFIC PROCEDURE      ovarian cyst drainage, resection of fibroid tumors     C NONSPECIFIC PROCEDURE      L cataract     C NONSPECIFIC PROCEDURE      L knee replacement     C RAD RESEC TONSIL/PILLARS       C SHOULDER SURG PROC UNLISTED      lt shoulder arthritis- replacement     C TOTAL KNEE ARTHROPLASTY      left knee total 08     ESOPHAGOSCOPY, GASTROSCOPY, DUODENOSCOPY (EGD), COMBINED N/A 2015    Procedure: COMBINED ESOPHAGOSCOPY, GASTROSCOPY, DUODENOSCOPY (EGD), BIOPSY SINGLE OR MULTIPLE;  Surgeon: Venkatesh Aguilera MD;  Location:  GI     ESOPHAGOSCOPY, GASTROSCOPY, DUODENOSCOPY (EGD), COMBINED N/A 2018    Procedure: COMBINED ESOPHAGOSCOPY, GASTROSCOPY, DUODENOSCOPY (EGD), BIOPSY SINGLE OR MULTIPLE;  gastroscopy;  Surgeon: Venkatesh Aguilera MD;  Location:  GI     LAPAROSCOPIC CHOLECYSTECTOMY N/A 2016    Procedure: LAPAROSCOPIC CHOLECYSTECTOMY;  Surgeon: Venkatesh Ford MD;  Location:  OR     SURGICAL HISTORY OF -       R cataract       Social History     Tobacco Use     Smoking status: Former Smoker     Last attempt to quit: 1985     Years since quittin.0     Smokeless tobacco: Never Used     Tobacco comment: 20 yrs smoking '65-'85, 1 PPD   Substance Use Topics     Alcohol use: Yes     Alcohol/week: 0.0 standard drinks     Comment: Occasional 2 beers weekly. 1-2 glasses wime monthly           Current Outpatient Medications   Medication  Sig Dispense Refill     hydrochlorothiazide (HYDRODIURIL) 25 MG tablet Take 1 tablet (25 mg) by mouth daily 90 tablet 1     omeprazole (PRILOSEC) 20 MG DR capsule Take 1 capsule (20 mg) by mouth 2 times daily 180 capsule 1     polyethylene glycol 400 (BLINK TEARS) 0.25 % SOLN ophthalmic solution 1 drop       simvastatin (ZOCOR) 20 MG tablet Take 1 tablet (20 mg) by mouth At Bedtime 90 tablet 3     tolterodine (DETROL) 2 MG tablet Take 1 tablet (2 mg) by mouth 2 times daily 180 tablet 1     valACYclovir (VALTREX) 500 MG tablet TAKE ONE TABLET BY MOUTH TWICE DAILY for 3 days 36 tablet 3     Cholecalciferol (VITAMIN D3 PO) Take by mouth daily       clotrimazole (LOTRIMIN) 1 % cream APPLY TOPICALLY 2 TIMES DAILY (Patient taking differently: APPLY TOPICALLY 2 TIMES DAILY PRN) 60 g 0     clotrimazole (LOTRIMIN) 1 % external cream APPLY TOPICALLY 2 TIMES DAILY 60 g 0     DAILY MULTIVITAMIN PO DAILY       FISH  MG OR CAPS 2 tablets daily       GLUCOSAMINE-CHONDROITIN PO        Ibuprofen (ADVIL PO)        OTHER MEDICAL SUPPLIES Scar away silicone scar sheets       polyethylene glycol 0.4%- propylene glycol 0.3% (SYSTANE ULTRA) 0.4-0.3 % SOLN ophthalmic solution Place 1 drop into both eyes At Bedtime       Scar Treatment Products (MEDERMA SPF 30 EX)        sucralfate (CARAFATE) 1 GM tablet Take 1 tablet (1 g) by mouth 4 times daily 40 tablet 1     Allergies   Allergen Reactions     No Known Drug Allergies      Recent Labs   Lab Test 05/07/19  1115 08/07/18  1448 05/01/18  1025 03/01/17  1226  06/28/16  1454   LDL 86  --  88 97  --   --    HDL 76  --  65 65  --   --    TRIG 91  --  100 107  --   --    ALT  --  22  --  19  --  26   CR 0.76 0.76 0.71 0.69   < > 0.76   GFRESTIMATED 78 74 81 83   < > 75   GFRESTBLACK >90 >90 >90 >90  African American GFR Calc     < > >90   GFR Calc     POTASSIUM 3.8 3.4 3.6 4.7   < > 4.2   TSH  --   --   --  1.13  --   --     < > = values in this interval not displayed.     "  BP Readings from Last 3 Encounters:   01/07/20 (!) 144/78   05/07/19 130/84   11/02/18 122/78    Wt Readings from Last 3 Encounters:   01/07/20 124.3 kg (274 lb)   05/07/19 126.1 kg (278 lb)   11/02/18 125.6 kg (277 lb)            Reviewed and updated as needed this visit by Provider  Tobacco  Allergies  Meds  Problems  Med Hx  Surg Hx  Fam Hx         Review of Systems   ROS COMP: Constitutional, HEENT, cardiovascular, pulmonary, gi and gu systems are negative, except as otherwise noted.      Objective    BP (!) 144/78   Pulse 95   Temp 97.6  F (36.4  C) (Oral)   Resp 16   Ht 1.753 m (5' 9\")   Wt 124.3 kg (274 lb)   SpO2 96%   BMI 40.46 kg/m    Body mass index is 40.46 kg/m .  Physical Exam   GENERAL APPEARANCE: healthy, alert and no distress     EYES: PERRL, sclera clear     HENT: nose and mouth without ulcers or lesions     NECK: no adenopathy, no asymmetry, masses, or scars and thyroid normal to palpation     RESP: lungs clear to auscultation - no rales, rhonchi or wheezes     CV: regular rates and rhythm, normal S1 S2, no S3 or S4 and no murmur, click or rub      Abdomen: soft, nontender, no HSM or masses and bowel sounds normal     Ext: warm, dry, 1-2+ edema     Diagnostic Test Results:  Labs reviewed in Epic  No results found for any visits on 01/07/20.        Assessment & Plan       ICD-10-CM    1. Essential hypertension I10    2. Bilateral leg edema R60.0 hydrochlorothiazide (HYDRODIURIL) 25 MG tablet   3. Stasis dermatitis of both legs I87.2 hydrochlorothiazide (HYDRODIURIL) 25 MG tablet   4. Mixed stress and urge urinary incontinence N39.46 tolterodine (DETROL) 2 MG tablet   5. Stallings's esophagus with low grade dysplasia K22.710 omeprazole (PRILOSEC) 20 MG DR capsule   6. Gastroesophageal reflux disease with esophagitis K21.0 omeprazole (PRILOSEC) 20 MG DR capsule   7. Hyperlipidemia LDL goal <130 E78.5    8. Herpes simplex vulvovaginitis A60.04 valACYclovir (VALTREX) 500 MG tablet   9. " "Morbid obesity (H) E66.01        HTN- BP better on recheck but still boderline.  Cont BP meds for now (hydrochlorothiazide 25mg/d), but change if still elevated.  Rest of meds refilled as needed.  Labs UTD as of 5/19.  Lipids also done in 5/19, continues on simvastatin 20mg/d.    GERD/Barretts/GI f/u- Pt reports last EGD looked normal, and that Dr. Echols reported her being 'healed' from Barretts.  She does not recall f/u recs, will try and call and ask.  She continues on prilosec 20mg twice daily.  Addendum- requested records at appt, and now abstracted to chart.  F/u recs for EGD in 3 yrs.  Problem list notes updated as well.    LE edema/Stasis Dermatitis- trying to use compression stockings more often, and less of the steroid cream.    Incontinence- rec reg Kegals.  If not improving, can consider urology referral.       BMI:   Estimated body mass index is 40.46 kg/m  as calculated from the following:    Height as of this encounter: 1.753 m (5' 9\").    Weight as of this encounter: 124.3 kg (274 lb).   Weight management plan: Discussed healthy diet and exercise guidelines      Return in about 4 months (around 5/7/2020) for Physical Exam, Routine Visit/Chronic Cares/Med Check, Fasting labs at appointment.    Marie Robledo MD  [facility]    "

## 2020-01-20 VITALS
DIASTOLIC BLOOD PRESSURE: 78 MMHG | HEART RATE: 95 BPM | SYSTOLIC BLOOD PRESSURE: 144 MMHG | RESPIRATION RATE: 16 BRPM | BODY MASS INDEX: 40.58 KG/M2 | OXYGEN SATURATION: 96 % | TEMPERATURE: 97.6 F | HEIGHT: 69 IN | WEIGHT: 274 LBS

## 2020-01-20 PROBLEM — R03.0 ELEVATED BP WITHOUT DIAGNOSIS OF HYPERTENSION: Status: ACTIVE | Noted: 2020-01-20

## 2020-01-20 PROBLEM — I10 ESSENTIAL HYPERTENSION: Status: ACTIVE | Noted: 2020-01-20

## 2020-01-29 DIAGNOSIS — B37.2 INTERTRIGINOUS CANDIDIASIS: ICD-10-CM

## 2020-01-30 RX ORDER — CLOTRIMAZOLE 1 %
CREAM (GRAM) TOPICAL
Qty: 60 G | Refills: 0 | Status: SHIPPED | OUTPATIENT
Start: 2020-01-30 | End: 2020-06-01

## 2020-01-30 NOTE — TELEPHONE ENCOUNTER
Routing refill request to provider for review/approval because:  Drug not on the [facility] refill protocol   Bernadette MCCULLOUGH RN    Last Written Prescription Date:  12/27/2018  Last Fill Quantity: 60,  # refills: 0   Last office visit: 1/7/2020 with prescribing provider:     Future Office Visit:    Requested Prescriptions   Pending Prescriptions Disp Refills     clotrimazole (LOTRIMIN) 1 % external cream [Pharmacy Med Name: Clotrimazole External Cream 1 %] 60 g 0     Sig: APPLY TOPICALLY 2 TIMES DAILY       There is no refill protocol information for this order

## 2020-02-18 DIAGNOSIS — E78.5 HYPERLIPIDEMIA LDL GOAL <130: ICD-10-CM

## 2020-02-18 NOTE — TELEPHONE ENCOUNTER
"Requested Prescriptions   Pending Prescriptions Disp Refills     simvastatin (ZOCOR) 20 MG tablet 90 tablet 3     Sig: Take 1 tablet (20 mg) by mouth At Bedtime       Statins Protocol Passed - 2/18/2020  9:35 AM        Passed - LDL on file in past 12 months     Recent Labs   Lab Test 05/07/19  1115   LDL 86             Passed - No abnormal creatine kinase in past 12 months     No lab results found.             Passed - Recent (12 mo) or future (30 days) visit within the authorizing provider's specialty     Patient has had an office visit with the authorizing provider or a provider within the authorizing providers department within the previous 12 mos or has a future within next 30 days. See \"Patient Info\" tab in inbasket, or \"Choose Columns\" in Meds & Orders section of the refill encounter.              Passed - Medication is active on med list        Passed - Patient is age 18 or older        Passed - No active pregnancy on record        Passed - No positive pregnancy test in past 12 months          "

## 2020-02-18 NOTE — TELEPHONE ENCOUNTER
Last Written Prescription Date:  5/7/19  Last Fill Quantity: 90,  # refills: 3   Last office visit: 1/7/2020 with prescribing provider:   1/7/20  Future Office Visit:   Next 5 appointments (look out 90 days)    May 05, 2020 10:00 AM CDT  PHYSICAL with Marie Robledo MD  [facility] ([facility]) [address] [id number] [phone]         Requested Prescriptions   Pending Prescriptions Disp Refills     simvastatin (ZOCOR) 20 MG tablet 90 tablet 3     Sig: Take 1 tablet (20 mg) by mouth At Bedtime       There is no refill protocol information for this order

## 2020-02-19 RX ORDER — SIMVASTATIN 20 MG
20 TABLET ORAL AT BEDTIME
Qty: 90 TABLET | Refills: 0 | Status: SHIPPED | OUTPATIENT
Start: 2020-02-19 | End: 2020-04-27

## 2020-02-19 NOTE — TELEPHONE ENCOUNTER
Pharmacy change  Prescription approved per [facility] Refill Protocol.  Bernadette MCCULLOUGH RN

## 2020-03-21 DIAGNOSIS — R60.0 BILATERAL LEG EDEMA: ICD-10-CM

## 2020-03-21 DIAGNOSIS — I87.2 STASIS DERMATITIS OF BOTH LEGS: ICD-10-CM

## 2020-03-21 DIAGNOSIS — K22.710 BARRETT'S ESOPHAGUS WITH LOW GRADE DYSPLASIA: ICD-10-CM

## 2020-03-21 DIAGNOSIS — N39.46 MIXED STRESS AND URGE URINARY INCONTINENCE: ICD-10-CM

## 2020-03-21 DIAGNOSIS — K21.00 GASTROESOPHAGEAL REFLUX DISEASE WITH ESOPHAGITIS: ICD-10-CM

## 2020-03-23 RX ORDER — TOLTERODINE TARTRATE 2 MG/1
TABLET, EXTENDED RELEASE ORAL
Qty: 180 TABLET | Refills: 0 | Status: SHIPPED | OUTPATIENT
Start: 2020-03-23 | End: 2020-06-18

## 2020-03-23 RX ORDER — HYDROCHLOROTHIAZIDE 25 MG/1
TABLET ORAL
Qty: 90 TABLET | Refills: 0 | Status: SHIPPED | OUTPATIENT
Start: 2020-03-23 | End: 2020-06-30

## 2020-03-23 NOTE — TELEPHONE ENCOUNTER
Rx's sent 1/7/2020 for 6 months  Pt states she spoke with pharmacy - they don't have refills  Called pharmacy - they don't have Rx's from 1/7/2020  Sent remaining 3 month Rx's to pharmacy   Pt informed  Bernadette MCCULLOUGH RN

## 2020-03-23 NOTE — TELEPHONE ENCOUNTER
Reason for Call:  Medication or medication refill:    Do you use a [facility]?  Name of the pharmacy and phone number for the current request:      [id number] - [facility] [address]    Name of the medication requested: Tolterodine, hydrochlorothiazide, omeprazole    Other request: Hiro wants to talk to someone about her medications.    Please call back.    Can we leave a detailed message on this number? YES    Phone number patient can be reached at: Cell number on file:    Telephone Information:   Mobile [phone]   Mobile [phone]       Best Time: any    Call taken on 3/23/2020 at 8:58 AM by Richard Meng

## 2020-03-23 NOTE — TELEPHONE ENCOUNTER
"Omeprazole  Last Written Prescription Date:  01/07/2020  Last Fill Quantity: 180,  # refills: 1   Last office visit: 1/7/2020 with prescribing provider:  yes   Future Office Visit:   Next 5 appointments (look out 90 days)    May 05, 2020 10:00 AM CDT  PHYSICAL with Marie Robledo MD  [facility] ([facility]) [address] [facility] [address]  [phone]           Hydrochlorothiazide  Last Written Prescription Date:  01/07/2020  Last Fill Quantity:90 ,  # refills: 1   Last office visit: 1/7/2020 with prescribing provider:  yes   Future Office Visit:   Next 5 appointments (look out 90 days)    May 05, 2020 10:00 AM CDT  PHYSICAL with Marie Robledo MD  [facility] ([address] [address]  [phone]         Tolterodine  Last Written Prescription Date:  01/07/2020  Last Fill Quantity: 180,  # refills: 1   Last office visit: 1/7/2020 with prescribing provider:  yes   Future Office Visit:   Next 5 appointments (look out 90 days)    May 05, 2020 10:00 AM CDT  PHYSICAL with Marie Robledo MD  [facility] ([facility]) [facility] [address]  [phone]           Requested Prescriptions   Pending Prescriptions Disp Refills     tolterodine (DETROL) 2 MG tablet [Pharmacy Med Name: Tolterodine Tartrate Oral Tablet 2 MG] 60 tablet 0     Sig: TAKE ONE TABLET BY MOUTH TWICE DAILY       Muscarinic Antagonists (Urinary Incontinence Agents) Passed - 3/23/2020  9:00 AM        Passed - Recent (12 mo) or future (30 days) visit within the authorizing provider's specialty     Patient has had an office visit with the authorizing provider or a provider within the authorizing providers department within the previous 12 mos or has a future within next 30 days. See \"Patient Info\" tab in inbasket, or \"Choose Columns\" in Meds & Orders section of the refill " "encounter.              Passed - Patient does not have a diagnosis of glaucoma on the problem list     If glaucoma diagnosis is new, refer refill to physician.          Passed - Medication is active on med list        Passed - Patient is 18 years of age or older           hydrochlorothiazide (HYDRODIURIL) 25 MG tablet [Pharmacy Med Name: hydroCHLOROthiazide Oral Tablet 25 MG] 30 tablet 0     Sig: TAKE ONE TABLET BY MOUTH ONCE DAILY       Diuretics (Including Combos) Protocol Failed - 3/23/2020  9:00 AM        Failed - Blood pressure under 140/90 in past 12 months     BP Readings from Last 3 Encounters:   01/07/20 (!) 144/78   05/07/19 130/84   11/02/18 122/78                 Passed - Recent (12 mo) or future (30 days) visit within the authorizing provider's specialty     Patient has had an office visit with the authorizing provider or a provider within the authorizing providers department within the previous 12 mos or has a future within next 30 days. See \"Patient Info\" tab in inbasket, or \"Choose Columns\" in Meds & Orders section of the refill encounter.              Passed - Medication is active on med list        Passed - Patient is age 18 or older        Passed - No active pregancy on record        Passed - Normal serum creatinine on file in past 12 months     Recent Labs   Lab Test 05/07/19  1115   CR 0.76              Passed - Normal serum potassium on file in past 12 months     Recent Labs   Lab Test 05/07/19  1115   POTASSIUM 3.8                    Passed - Normal serum sodium on file in past 12 months     Recent Labs   Lab Test 05/07/19  1115                 Passed - No positive pregnancy test in past 12 months           omeprazole (PRILOSEC) 20 MG DR capsule [Pharmacy Med Name: Omeprazole Oral Capsule Delayed Release 20 MG] 60 capsule 0     Sig: TAKE ONE CAPSULE BY MOUTH TWICE DAILY       PPI Protocol Passed - 3/23/2020  9:00 AM        Passed - Not on Clopidogrel (unless Pantoprazole ordered)        " "Passed - No diagnosis of osteoporosis on record        Passed - Recent (12 mo) or future (30 days) visit within the authorizing provider's specialty     Patient has had an office visit with the authorizing provider or a provider within the authorizing providers department within the previous 12 mos or has a future within next 30 days. See \"Patient Info\" tab in inbasket, or \"Choose Columns\" in Meds & Orders section of the refill encounter.              Passed - Medication is active on med list        Passed - Patient is age 18 or older        Passed - No active pregnacy on record        Passed - No positive pregnancy test in past 12 months           "

## 2020-04-27 DIAGNOSIS — E78.5 HYPERLIPIDEMIA LDL GOAL <130: ICD-10-CM

## 2020-04-27 RX ORDER — SIMVASTATIN 20 MG
TABLET ORAL
Qty: 90 TABLET | Refills: 0 | Status: SHIPPED | OUTPATIENT
Start: 2020-04-27 | End: 2020-09-08

## 2020-04-27 NOTE — TELEPHONE ENCOUNTER
"Simvastatin Oral Tablet 20 MG   Last Written Prescription Date:  02/19/2020  Last Fill Quantity: 90,  # refills: 0   Last office visit: 1/7/2020 with prescribing provider:  TALI   Future Office Visit: 07/10/2020  Next 5 appointments (look out 90 days)    Jul 10, 2020 10:00 AM CDT  PHYSICAL with Marie Robledo MD  [facility] ([facility]) [address] [facility] [facility] [address] [phone]         Requested Prescriptions   Pending Prescriptions Disp Refills     simvastatin (ZOCOR) 20 MG tablet [Pharmacy Med Name: Simvastatin Oral Tablet 20 MG] 90 tablet 0     Sig: TAKE ONE TABLET BY MOUTH AT BEDTIME       Statins Protocol Passed - 4/27/2020  7:01 AM        Passed - LDL on file in past 12 months     Recent Labs   Lab Test 05/07/19  1115   LDL 86             Passed - No abnormal creatine kinase in past 12 months     No lab results found.             Passed - Recent (12 mo) or future (30 days) visit within the authorizing provider's specialty     Patient has had an office visit with the authorizing provider or a provider within the authorizing providers department within the previous 12 mos or has a future within next 30 days. See \"Patient Info\" tab in inbasket, or \"Choose Columns\" in Meds & Orders section of the refill encounter.              Passed - Medication is active on med list        Passed - Patient is age 18 or older        Passed - No active pregnancy on record        Passed - No positive pregnancy test in past 12 months           "

## 2020-05-29 DIAGNOSIS — B37.2 INTERTRIGINOUS CANDIDIASIS: ICD-10-CM

## 2020-05-29 NOTE — TELEPHONE ENCOUNTER
Clotrimazole External Cream 1 %   Last Written Prescription Date:  01/30/2020  Last Fill Quantity: 60G,  # refills: 0   Last office visit: 1/7/2020 with prescribing provider:  TALI   Future Office Visit: 07/10/2020 WITH TALI  Next 5 appointments (look out 90 days)    Jul 10, 2020 10:00 AM CDT  PHYSICAL with Marie Robledo MD  [facility] ([facility]) [address] [facility] [address] [phone]             Requested Prescriptions   Pending Prescriptions Disp Refills     clotrimazole (LOTRIMIN) 1 % external cream [Pharmacy Med Name: Clotrimazole External Cream 1 %] 60 g 0     Sig: APPLY TOPICALLY 2 TIMES DAILY as needed       There is no refill protocol information for this order

## 2020-06-01 RX ORDER — CLOTRIMAZOLE 1 %
CREAM (GRAM) TOPICAL
Qty: 60 G | Refills: 2 | Status: SHIPPED | OUTPATIENT
Start: 2020-06-01 | End: 2020-09-08

## 2020-06-17 DIAGNOSIS — K21.00 GASTROESOPHAGEAL REFLUX DISEASE WITH ESOPHAGITIS: ICD-10-CM

## 2020-06-17 DIAGNOSIS — K22.710 BARRETT'S ESOPHAGUS WITH LOW GRADE DYSPLASIA: ICD-10-CM

## 2020-06-17 DIAGNOSIS — N39.46 MIXED STRESS AND URGE URINARY INCONTINENCE: ICD-10-CM

## 2020-06-18 RX ORDER — TOLTERODINE TARTRATE 2 MG/1
TABLET, EXTENDED RELEASE ORAL
Qty: 60 TABLET | Refills: 0 | Status: SHIPPED | OUTPATIENT
Start: 2020-06-18 | End: 2020-06-30

## 2020-06-18 NOTE — TELEPHONE ENCOUNTER
Prescription approved per [facility].  Bernadette MCCULLOUGH RN

## 2020-06-30 ENCOUNTER — VIRTUAL VISIT (OUTPATIENT)
Dept: FAMILY MEDICINE | Facility: CLINIC | Age: 75
End: 2020-06-30
Payer: COMMERCIAL

## 2020-06-30 VITALS — HEART RATE: 84 BPM | SYSTOLIC BLOOD PRESSURE: 143 MMHG | DIASTOLIC BLOOD PRESSURE: 88 MMHG

## 2020-06-30 DIAGNOSIS — K22.710 BARRETT'S ESOPHAGUS WITH LOW GRADE DYSPLASIA: ICD-10-CM

## 2020-06-30 DIAGNOSIS — I87.2 STASIS DERMATITIS OF BOTH LEGS: ICD-10-CM

## 2020-06-30 DIAGNOSIS — R03.0 ELEVATED BP WITHOUT DIAGNOSIS OF HYPERTENSION: ICD-10-CM

## 2020-06-30 DIAGNOSIS — A60.04 HERPES SIMPLEX VULVOVAGINITIS: ICD-10-CM

## 2020-06-30 DIAGNOSIS — R60.0 BILATERAL LEG EDEMA: ICD-10-CM

## 2020-06-30 DIAGNOSIS — M85.80 OSTEOPENIA, UNSPECIFIED LOCATION: ICD-10-CM

## 2020-06-30 DIAGNOSIS — K21.00 GASTROESOPHAGEAL REFLUX DISEASE WITH ESOPHAGITIS: ICD-10-CM

## 2020-06-30 DIAGNOSIS — Z00.00 ENCOUNTER FOR MEDICARE ANNUAL WELLNESS EXAM: Primary | ICD-10-CM

## 2020-06-30 DIAGNOSIS — E78.5 HYPERLIPIDEMIA LDL GOAL <130: ICD-10-CM

## 2020-06-30 DIAGNOSIS — N39.46 MIXED STRESS AND URGE URINARY INCONTINENCE: ICD-10-CM

## 2020-06-30 PROCEDURE — 99397 PER PM REEVAL EST PAT 65+ YR: CPT | Mod: 95 | Performed by: FAMILY MEDICINE

## 2020-06-30 RX ORDER — VALACYCLOVIR HYDROCHLORIDE 500 MG/1
TABLET, FILM COATED ORAL
Qty: 36 TABLET | Refills: 3 | Status: SHIPPED | OUTPATIENT
Start: 2020-06-30 | End: 2021-08-06

## 2020-06-30 RX ORDER — TOLTERODINE TARTRATE 2 MG/1
2 TABLET, EXTENDED RELEASE ORAL 2 TIMES DAILY
Qty: 180 TABLET | Refills: 1 | Status: SHIPPED | OUTPATIENT
Start: 2020-06-30 | End: 2021-01-25

## 2020-06-30 RX ORDER — HYDROCHLOROTHIAZIDE 25 MG/1
25 TABLET ORAL DAILY
Qty: 90 TABLET | Refills: 1 | Status: SHIPPED | OUTPATIENT
Start: 2020-06-30 | End: 2021-01-12

## 2020-06-30 NOTE — PROGRESS NOTES
"Hiro Carranza is a 74 year old female who is being evaluated via a billable video visit.      The patient has been notified of following:     \"This video visit will be conducted via a call between you and your physician/provider. We have found that certain health care needs can be provided without the need for an in-person physical exam.  This service lets us provide the care you need with a video conversation.  If a prescription is necessary we can send it directly to your pharmacy.  If lab work is needed we can place an order for that and you can then stop by our lab to have the test done at a later time.    Video visits are billed at different rates depending on your insurance coverage.  Please reach out to your insurance provider with any questions.    If during the course of the call the physician/provider feels a video visit is not appropriate, you will not be charged for this service.\"    Patient has given verbal consent for Video visit? Yes  How would you like to obtain your AVS? Mail a copy  Patient would like the video invitation sent by: Text to cell phone: [phone]  Will anyone else be joining your video visit? No  {If patient encounters technical issues they should call [phone] :[id number]}    Subjective     Hiro Carranza is a 74 year old female who presents today via video visit for the following health issues:    HPI  Hyperlipidemia Follow-Up      Are you regularly taking any medication or supplement to lower your cholesterol?   Yes- Simvastatin  (Zocor) 20mg tablets    Are you having muscle aches or other side effects that you think could be caused by your cholesterol lowering medication?  No    Hypertension Follow-up      Do you check your blood pressure regularly outside of the clinic? Yes     Are you following a low salt diet? Yes    Are your blood pressures ever more than 140 on the top number (systolic) OR more   than 90 on the bottom number (diastolic), for example 140/90? No      How many " "servings of fruits and vegetables do you eat daily?  0-1    On average, how many sweetened beverages do you drink each day (Examples: soda, juice, sweet tea, etc.  Do NOT count diet or artificially sweetened beverages)?   0    How many days per week do you exercise enough to make your heart beat faster? 3 or less    How many minutes a day do you exercise enough to make your heart beat faster? 9 or less    How many days per week do you miss taking your medication? 0         Video Start Time: {video visit start/end time for provider to select:087458}    {additonal problems for provider to add (Optional):806252}    {HIST REVIEW/ LINKS 2 (Optional):245098}    Reviewed and updated as needed this visit by Provider         Review of Systems   {ROS COMP (Optional):320806}      Objective             Physical Exam     {video visit exam brief selected:165653::\"GENERAL: Healthy, alert and no distress\",\"EYES: Eyes grossly normal to inspection.  No discharge or erythema, or obvious scleral/conjunctival abnormalities.\",\"RESP: No audible wheeze, cough, or visible cyanosis.  No visible retractions or increased work of breathing.  \",\"SKIN: Visible skin clear. No significant rash, abnormal pigmentation or lesions.\",\"NEURO: Cranial nerves grossly intact.  Mentation and speech appropriate for age.\",\"PSYCH: Mentation appears normal, affect normal/bright, judgement and insight intact, normal speech and appearance well-groomed.\"}      {Diagnostic Test Results (Optional):311758::\"Diagnostic Test Results:\",\"Labs reviewed in Epic\"}        {PROVIDER CHARTING PREFERENCE:475196}      Video-Visit Details    Type of service:  Video Visit    Video End Time:{video visit start/end time for provider to select:598853}    Originating Location (pt. Location): {video visit patient location:003310::\"Home\"}    Distant Location (provider location):  [facility]     Platform used for Video Visit: {Virtual Visit Platforms:022927::\"AmWell\"}    No " follow-ups on file.       {signature options:834355}

## 2020-06-30 NOTE — PROGRESS NOTES
"Hiro Carranza is a 74 year old female who is being evaluated via a billable video visit.      The patient has been notified of following:     \"This video visit will be conducted via a call between you and your physician/provider. We have found that certain health care needs can be provided without the need for an in-person physical exam.  This service lets us provide the care you need with a video conversation.  If a prescription is necessary we can send it directly to your pharmacy.  If lab work is needed we can place an order for that and you can then stop by our lab to have the test done at a later time.    Video visits are billed at different rates depending on your insurance coverage.  Please reach out to your insurance provider with any questions.    If during the course of the call the physician/provider feels a video visit is not appropriate, you will not be charged for this service.\"    Patient has given verbal consent for Video visit? Yes  How would you like to obtain your AVS? Mail a copy  Patient would like the video invitation sent by: Text to cell phone: [phone]  Will anyone else be joining your video visit? No        Subjective     Hiro Carranza is a 74 year old female who presents today via video visit for the following health issues:    HPI  Annual Wellness Visit    Are you in the first 12 months of your Medicare Part B coverage?  No    Physical Health:    In general, how would you rate your overall physical health? good    Outside of work, how many days during the week do you exercise?1 day/week to none    Outside of work, approximately how many minutes a day do you exercise?less than 15 minutes    If you drink alcohol do you typically have >3 drinks per day or >7 drinks per week? No    Do you usually eat at least 4 servings of fruit and vegetables a day, include whole grains & fiber and avoid regularly eating high fat or \"junk\" foods? NO    Do you have any problems taking medications " regularly? No    Do you have any side effects from medications? not applicable    Needs assistance for the following daily activities: no assistance needed    Which of the following safety concerns are present in your home?  throw rugs , may need holman rails    Hearing impairment: No    In the past 6 months, have you been bothered by leaking of urine? yes    LMP  (Approximate) n/a   Breastfeeding No   Weight: Unable to obtain due to video visit  Height: Unable to obtain due to video visit  BMI: Unable to obtain due to video visit  Blood Pressure: Provided by patient 143/88  Temp - 97.9F    Mental Health:    In general, how would you rate your overall mental or emotional health? good  PHQ-2 Score:      Do you feel safe in your environment? Yes    Have you ever done Advance Care Planning? (For example, a Health Directive, POLST, or a discussion with a medical provider or your loved ones about your wishes)? Yes, advance care planning is on file.    Fall risk:  Fallen 2 or more times in the past year?: No  Any fall with injury in the past year?: No    Cognitive Screenin) Repeat 3 items (Leader, Season, Table)    2) Clock draw: NORMAL  3) 3 item recall: Recalls 3 objects  Results: 3 items recalled: COGNITIVE IMPAIRMENT LESS LIKELY    Mini-CogTM Copyright S [employer]. Licensed by the author for use in [facility]; reprinted with permission (triny@.[facility]). All rights reserved.      Do you have sleep apnea, excessive snoring or daytime drowsiness?: no    Current providers sharing in care for this patient include:   Patient Care Team:  Marie Robledo MD as PCP - General  Marie Robledo MD as Assigned PCP       Video Start Time: 10:17 AM      Doing fairly well- staying mostly at home due to COVID 19- just getting out for shopping and picking up meals.   goes to gun clubs, so he has more exposures.  Not getting doing much for walking.  [place]  She has a treadmill, hasn't  been doing that much either.    --BP was borderline today.  Will try and check more - 1-2x/wk, and call if SBP >145    --GERD- we were able to obtain records from GI since pt's last appt- reviewed recs with pt- f/u endoscopy in 4/22- 3 yr f/u.    --Incontinence- about the same sx's, maybe a bit better control.  She is doing some kegals- bit more than in the past.    --Osteopenia-   Dexa f/u- from 6/19- reviewed in depth with pt.  Findings- Bilateral hip osteopenia.  FRAX- major osteoporotic 10-yr risk 15% (significant if >20%)  Hip risk 2% (significant if >3%).  Calcium intake- daily cereal with milk, and often has glass of milk.    Does some cheese and yogurt fairly often.  Vit D- likely getting ~2000 units/day.    --LE edema- doing okay on the hydrochlorothiazide.  The cream for her legs- clotrimazole- needs that periodically.    --Labs- Overdue for yearly labs- will try and get in, [place] of [facility].      Patient Active Problem List   Diagnosis     Stallings's esophagus with low grade dysplasia     Mixed stress and urge urinary incontinence     Pain in shoulder     Osteopenia     Onychogryposis and onychomycosis     Hyperlipidemia LDL goal <130     Other viral warts     Genital herpes     Advance Care Planning     Gastroesophageal reflux disease with esophagitis     Other dental procedure status     Arthritis of knee, right     Acute posthemorrhagic anemia     Physical deconditioning     Constipation     MARK (obstructive sleep apnea)(Mild AHI=5)     Calculus of gallbladder without cholecystitis without obstruction     Cholelithiases     Shortened [place] interval     Morbid obesity (H)     SCC (squamous cell carcinoma), face     Bilateral leg edema     Essential hypertension     Past Surgical History:   Procedure Laterality Date     ARTHROPLASTY KNEE Right 6/10/2015    Procedure: ARTHROPLASTY KNEE;  Surgeon: Jorge Luis Snyder MD;  Location:  OR     C NONSPECIFIC PROCEDURE  2004     ovarian cyst drainage, resection of fibroid tumors     C NONSPECIFIC PROCEDURE      L cataract     C NONSPECIFIC PROCEDURE      L knee replacement     C RAD RESEC TONSIL/PILLARS       C SHOULDER SURG PROC UNLISTED      lt shoulder arthritis- replacement     C TOTAL KNEE ARTHROPLASTY      left knee total 08     ESOPHAGOSCOPY, GASTROSCOPY, DUODENOSCOPY (EGD), COMBINED N/A 2015    Procedure: COMBINED ESOPHAGOSCOPY, GASTROSCOPY, DUODENOSCOPY (EGD), BIOPSY SINGLE OR MULTIPLE;  Surgeon: Venkatesh Aguilera MD;  Location:  GI     ESOPHAGOSCOPY, GASTROSCOPY, DUODENOSCOPY (EGD), COMBINED N/A 2018    Procedure: COMBINED ESOPHAGOSCOPY, GASTROSCOPY, DUODENOSCOPY (EGD), BIOPSY SINGLE OR MULTIPLE;  gastroscopy;  Surgeon: Venkatesh Aguilera MD;  Location:  GI     LAPAROSCOPIC CHOLECYSTECTOMY N/A 2016    Procedure: LAPAROSCOPIC CHOLECYSTECTOMY;  Surgeon: Venkatesh Ford MD;  Location:  OR     SURGICAL HISTORY OF -       R cataract       Social History     Tobacco Use     Smoking status: Former Smoker     Last attempt to quit: 1985     Years since quittin.5     Smokeless tobacco: Never Used     Tobacco comment: 20 yrs smoking '65-'85, 1 PPD   Substance Use Topics     Alcohol use: Yes     Alcohol/week: 0.0 standard drinks     Comment: Occasional 2 beers weekly. 1-2 glasses wime monthly     Family History   Problem Relation Age of Onset     C.A.D. Father         triple bypass in his late 60's     Circulatory Father      Lipids Father      Obesity Father      C.A.D. Maternal Grandfather         death from MI in early 60s     Obesity Maternal Grandfather      C.A.D. Paternal Grandfather         MI in early 70s     Obesity Paternal Grandfather      Hypertension Mother      Arthritis Mother      Lipids Mother      Musculoskeletal Disorder Mother         double knee replacement     Obesity Mother      Cerebrovascular Disease Maternal Grandmother      Obesity Maternal Grandmother      Obesity  Paternal Grandmother          Current Outpatient Medications   Medication Sig Dispense Refill     Cholecalciferol (VITAMIN D3 PO) Take by mouth daily       clotrimazole (LOTRIMIN) 1 % external cream APPLY TOPICALLY 2 TIMES DAILY as needed 60 g 2     DAILY MULTIVITAMIN PO DAILY       FISH  MG OR CAPS 2 tablets daily       GLUCOSAMINE-CHONDROITIN PO        hydrochlorothiazide (HYDRODIURIL) 25 MG tablet Take 1 tablet (25 mg) by mouth daily 90 tablet 1     Ibuprofen (ADVIL PO)        omeprazole (PRILOSEC) 20 MG DR capsule Take 1 capsule (20 mg) by mouth 2 times daily 180 capsule 1     OTHER MEDICAL SUPPLIES Scar away silicone scar sheets       polyethylene glycol 0.4%- propylene glycol 0.3% (SYSTANE ULTRA) 0.4-0.3 % SOLN ophthalmic solution Place 1 drop into both eyes At Bedtime       polyethylene glycol 400 (BLINK TEARS) 0.25 % SOLN ophthalmic solution 1 drop       Scar Treatment Products (MEDERMA SPF 30 EX)        simvastatin (ZOCOR) 20 MG tablet TAKE ONE TABLET BY MOUTH AT BEDTIME  90 tablet 0     sucralfate (CARAFATE) 1 GM tablet Take 1 tablet (1 g) by mouth 4 times daily 40 tablet 1     tolterodine (DETROL) 2 MG tablet Take 1 tablet (2 mg) by mouth 2 times daily 180 tablet 1     valACYclovir (VALTREX) 500 MG tablet TAKE ONE TABLET BY MOUTH TWICE DAILY for 3 days 36 tablet 3     Allergies   Allergen Reactions     No Known Drug Allergies      Recent Labs   Lab Test 07/01/20  1007 05/07/19  1115 08/07/18  1448 05/01/18  1025 03/01/17  1226  06/28/16  1454   LDL 90 86  --  88 97  --   --    HDL 59 76  --  65 65  --   --    TRIG 97 91  --  100 107  --   --    ALT  --   --  22  --  19  --  26   CR 0.70 0.76 0.76 0.71 0.69   < > 0.76   GFRESTIMATED 85 78 74 81 83   < > 75   GFRESTBLACK >90 >90 >90 >90 >90  African American GFR Calc     < > >90   GFR Calc     POTASSIUM 3.6 3.8 3.4 3.6 4.7   < > 4.2   TSH  --   --   --   --  1.13  --   --     < > = values in this interval not displayed.      BP  Readings from Last 3 Encounters:   06/30/20 (!) 143/88   01/07/20 (!) 144/78   05/07/19 130/84    Wt Readings from Last 3 Encounters:   01/07/20 124.3 kg (274 lb)   05/07/19 126.1 kg (278 lb)   11/02/18 125.6 kg (277 lb)                    Reviewed and updated as needed this visit by Provider  Tobacco  Allergies  Meds  Problems  Med Hx  Surg Hx  Fam Hx         Review of Systems   Constitutional, HEENT, cardiovascular, pulmonary, GI, , musculoskeletal, neuro, skin, endocrine and psych systems are negative, except as otherwise noted.      Objective             Physical Exam     GENERAL: Healthy, alert and no distress  EYES: Eyes grossly normal to inspection.  No discharge or erythema, or obvious scleral/conjunctival abnormalities.  RESP: No audible wheeze, cough, or visible cyanosis.  No visible retractions or increased work of breathing.    SKIN: Visible skin clear. No significant rash, abnormal pigmentation or lesions.  NEURO: Cranial nerves grossly intact.  Mentation and speech appropriate for age.  PSYCH: Mentation appears normal, affect normal/bright, judgement and insight intact, normal speech and appearance well-groomed.      Diagnostic Test Results:  Labs reviewed in Epic        Assessment & Plan       ICD-10-CM    1. Encounter for Medicare annual wellness exam  Z00.00    2. Bilateral leg edema  R60.0 hydrochlorothiazide (HYDRODIURIL) 25 MG tablet     **Basic metabolic panel FUTURE anytime     Albumin Random Urine Quantitative with Creat Ratio   3. Stasis dermatitis of both legs  I87.2 hydrochlorothiazide (HYDRODIURIL) 25 MG tablet   4. Osteopenia, unspecified location  M85.80    5. Stallings's esophagus with low grade dysplasia  K22.710 omeprazole (PRILOSEC) 20 MG DR capsule   6. Gastroesophageal reflux disease with esophagitis  K21.0 omeprazole (PRILOSEC) 20 MG DR capsule   7. Hyperlipidemia LDL goal <130  E78.5 Lipid panel reflex to direct LDL Fasting   8. Mixed stress and urge urinary incontinence   "N39.46 tolterodine (DETROL) 2 MG tablet   9. Herpes simplex vulvovaginitis  A60.04 valACYclovir (VALTREX) 500 MG tablet   10. Elevated BP without diagnosis of hypertension  R03.0 **Basic metabolic panel FUTURE anytime     Albumin Random Urine Quantitative with Creat Ratio        Pt doing well overall, no major concerns, but addressed chronic issues as well as medicare wellness issues.    --Leg edema, borderline BP's a bit high/borderline.  On hydrochlorothiazide more for leg swelling than BP's.   has home BP monitor- she'll start checking 1-2x/wk, and make appt if SBPs>140 consistently.  Work on getting more activity as well.    --Dexa- discussed 6/19 dexa results in depth- osteopenia, does not need rx meds.  Getting good calcium/d.  Will try and get more walking in.    --Incontinence- bit better with prn kegals- will try and do more consistently, with starting as she's clicking through commercials.  Cont detrol as well.    --Barretts/GERD- on q3yr f/u, due in 4/22, cont PPI, refills sent.    --Lipids- pt will try and get in for     Rest of meds are stable, no se's.  Refill sent.    She'll try and come in for fasting labs soon- future labs ordered.    Return in about 6 months (around 12/30/2020) for Routine Visit/Chronic Cares/Med Check, sooner if SBPs >140.      BMI:   Estimated body mass index is 40.46 kg/m  as calculated from the following:    Height as of 1/7/20: 1.753 m (5' 9\").    Weight as of 1/7/20: 124.3 kg (274 lb).   Weight management plan: Discussed healthy diet and exercise guidelines      Marie Robledo MD  [facility]        Video-Visit Details    Type of service:  Video Visit    Video Start Time: 10:17 AM  Video End Time:10:40 AM    Originating Location (pt. Location): Home    Distant Location (provider location):  [facility]     Platform used for Video Visit: Doximity    Return in about 6 months (around 12/30/2020) for Routine Visit/Chronic Cares/Med Check, " sooner if SBPs >140.       Marie Robledo MD

## 2020-06-30 NOTE — PATIENT INSTRUCTIONS
Patient Education   Personalized Prevention Plan  You are due for the preventive services outlined below.  Your care team is available to assist you in scheduling these services.  If you have already completed any of these items, please share that information with your care team to update in your medical record.  Health Maintenance Due   Topic Date Due     Pneumococcal Vaccine (2 of 2 - PPSV23) 07/01/2016     Annual Wellness Visit  05/07/2020

## 2020-07-01 DIAGNOSIS — E78.5 HYPERLIPIDEMIA LDL GOAL <130: ICD-10-CM

## 2020-07-01 DIAGNOSIS — R03.0 ELEVATED BP WITHOUT DIAGNOSIS OF HYPERTENSION: ICD-10-CM

## 2020-07-01 DIAGNOSIS — R60.0 BILATERAL LEG EDEMA: ICD-10-CM

## 2020-07-01 LAB
ANION GAP SERPL CALCULATED.3IONS-SCNC: 6 MMOL/L (ref 3–14)
BUN SERPL-MCNC: 15 MG/DL (ref 7–30)
CALCIUM SERPL-MCNC: 9.4 MG/DL (ref 8.5–10.1)
CHLORIDE SERPL-SCNC: 106 MMOL/L (ref 94–109)
CHOLEST SERPL-MCNC: 168 MG/DL
CO2 SERPL-SCNC: 26 MMOL/L (ref 20–32)
CREAT SERPL-MCNC: 0.7 MG/DL (ref 0.52–1.04)
CREAT UR-MCNC: 94 MG/DL
GFR SERPL CREATININE-BSD FRML MDRD: 85 ML/MIN/{1.73_M2}
GLUCOSE SERPL-MCNC: 88 MG/DL (ref 70–99)
HDLC SERPL-MCNC: 59 MG/DL
LDLC SERPL CALC-MCNC: 90 MG/DL
MICROALBUMIN UR-MCNC: <5 MG/L
MICROALBUMIN/CREAT UR: NORMAL MG/G CR (ref 0–25)
NONHDLC SERPL-MCNC: 109 MG/DL
POTASSIUM SERPL-SCNC: 3.6 MMOL/L (ref 3.4–5.3)
SODIUM SERPL-SCNC: 138 MMOL/L (ref 133–144)
TRIGL SERPL-MCNC: 97 MG/DL

## 2020-07-01 PROCEDURE — 80061 LIPID PANEL: CPT | Performed by: FAMILY MEDICINE

## 2020-07-01 PROCEDURE — 80048 BASIC METABOLIC PNL TOTAL CA: CPT | Performed by: FAMILY MEDICINE

## 2020-07-01 PROCEDURE — 82043 UR ALBUMIN QUANTITATIVE: CPT | Performed by: FAMILY MEDICINE

## 2020-07-01 PROCEDURE — 36415 COLL VENOUS BLD VENIPUNCTURE: CPT | Performed by: FAMILY MEDICINE

## 2020-07-13 NOTE — RESULT ENCOUNTER NOTE
Please send letter below with copy of results.  Thanks! CW    Here are your lab results from your recent visit...  -Your basic metabolic panel (which includes electrolyte levels, blood sugar level and kidney function tests) looks great.  -Your microalbumin level (which is the urine test that can signal signs of early chronic kidney disease if elevated to >30) is very low which is good.  -Your cholesterol panel looks great with a low LDL (the bad cholesterol) and an okay HDL (the good cholesterol), though it's down to 59 from 60-70s the last couple times.  The HDL is more related to activity/exercise- perhaps you've been moving less lately?     Please let me know if you have any questions.  Best,   Mitchell Robledo MD

## 2020-09-08 ENCOUNTER — OFFICE VISIT (OUTPATIENT)
Dept: FAMILY MEDICINE | Facility: CLINIC | Age: 75
End: 2020-09-08
Payer: COMMERCIAL

## 2020-09-08 VITALS
OXYGEN SATURATION: 95 % | TEMPERATURE: 98 F | DIASTOLIC BLOOD PRESSURE: 80 MMHG | HEART RATE: 87 BPM | RESPIRATION RATE: 16 BRPM | SYSTOLIC BLOOD PRESSURE: 123 MMHG | WEIGHT: 272 LBS | HEIGHT: 67 IN | BODY MASS INDEX: 42.69 KG/M2

## 2020-09-08 DIAGNOSIS — N39.46 MIXED STRESS AND URGE URINARY INCONTINENCE: ICD-10-CM

## 2020-09-08 DIAGNOSIS — E78.5 HYPERLIPIDEMIA LDL GOAL <130: ICD-10-CM

## 2020-09-08 DIAGNOSIS — R60.0 BILATERAL LEG EDEMA: ICD-10-CM

## 2020-09-08 DIAGNOSIS — Z00.00 ROUTINE GENERAL MEDICAL EXAMINATION AT A HEALTH CARE FACILITY: Primary | ICD-10-CM

## 2020-09-08 DIAGNOSIS — I10 ESSENTIAL HYPERTENSION: ICD-10-CM

## 2020-09-08 DIAGNOSIS — B37.2 INTERTRIGINOUS CANDIDIASIS: ICD-10-CM

## 2020-09-08 DIAGNOSIS — K22.710 BARRETT'S ESOPHAGUS WITH LOW GRADE DYSPLASIA: ICD-10-CM

## 2020-09-08 PROCEDURE — 99397 PER PM REEVAL EST PAT 65+ YR: CPT | Performed by: FAMILY MEDICINE

## 2020-09-08 RX ORDER — SIMVASTATIN 20 MG
TABLET ORAL
Qty: 90 TABLET | Refills: 3 | Status: SHIPPED | OUTPATIENT
Start: 2020-09-08 | End: 2022-08-05

## 2020-09-08 RX ORDER — CLOTRIMAZOLE 1 %
CREAM (GRAM) TOPICAL
Qty: 60 G | Refills: 2 | Status: SHIPPED | OUTPATIENT
Start: 2020-09-08 | End: 2022-01-04

## 2020-09-08 ASSESSMENT — MIFFLIN-ST. JEOR: SCORE: 1761.41

## 2020-09-08 NOTE — PROGRESS NOTES
SUBJECTIVE:   Hiro Carranza is a 75 year old female who presents for Preventive Visit.    Are you in the first 12 months of your Medicare coverage?  No    HPI  Do you feel safe in your environment? Yes    Have you ever done Advance Care Planning? (For example, a Health Directive, POLST, or a discussion with a medical provider or your loved ones about your wishes): Yes, advance care planning is on file.       last week was in hospital for heart attacks- more stents placed.  Nerve-wracking, but he's doing okay now.    Pt's home BPs-  143/88, 136/84, 134/77, 147/88, 155/92,   Bp today looks good.  123/80.  BP recheck here 132/72.    Diet/exercise-   She's fallen away from the treadmill exercise- maybe the past month.  Prior to that, she was working up to 20 min/day.  After a lapse, she started back 10 min/day.  Watches tv while she does it.  Records them.  Watches [email or web address] the next morning- could link that   Feels good when she does it.  Otherwise sitting a lot, unless doing things around the house.    F/u last virtual visit on 6/30/20-   Labs done on 7/1/20- all looked really good- normal BMP, microalb, lipids.    Incontinence- has noticing that it's been better since she's doing the Kegal's right.  Hasn't been regular, but doing them more.  She's also continuing on the detrol- on it for yrs.    Leg edema- wearing the compression stockings more regularly, and legs feel better, not getting as swollen.  Got them from the dermatologist.  Medium compression stockings- from medical supply store.  Has band of dots, which helps them not role down.    She had a bump on her ear- asked the dermatologist.  Derm said it was from always sleeping on that side.  Went online - found a pillow with a hole in it- and it works!  Bump is resolving.    Clotrimazole in groin and legs- thinks she's talked to dermatology about it- thinks they're okay with it.  Usually doesn't use it 2x/day- 1x/day after shower.    Fall risk      click delete button to remove this line now  Cognitive Screening   1) Repeat 3 items (Leader, Season, Table)    2) Clock draw:   3) 3 item recall: Recalls 3 objects  Results: 3 items recalled: COGNITIVE IMPAIRMENT LESS LIKELY    Mini-CogTM Copyright RALPH Ramsay. Licensed by the author for use in [facility]; reprinted with permission (triny@[facility]). All rights reserved.      Do you have sleep apnea, excessive snoring or daytime drowsiness?: no    Reviewed and updated as needed this visit by clinical staff  Tobacco  Allergies  Meds  Problems  Med Hx  Surg Hx  Fam Hx         Reviewed and updated as needed this visit by Provider  Tobacco  Allergies  Meds  Problems  Med Hx  Surg Hx  Fam Hx        Social History     Tobacco Use     Smoking status: Former Smoker     Last attempt to quit: 1985     Years since quittin.7     Smokeless tobacco: Never Used     Tobacco comment: 20 yrs smoking '65-85, 1 PPD   Substance Use Topics     Alcohol use: Yes     Alcohol/week: 0.0 standard drinks     Comment: Occasional 2 beers weekly. 1-2 glasses wime monthly     If you drink alcohol do you typically have >3 drinks per day or >7 drinks per week? No    Alcohol Use 2020   Prescreen: >3 drinks/day or >7 drinks/week? -   Prescreen: >3 drinks/day or >7 drinks/week? No         Current providers sharing in care for this patient include:   Patient Care Team:  Marie Robledo MD as PCP - General  Marie Robledo MD as Assigned PCP    The following health maintenance items are reviewed in Epic and correct as of today:  Health Maintenance   Topic Date Due     PNEUMOCOCCAL IMMUNIZATION 65+ LOW/MEDIUM RISK (2 of 2 - PPSV23) 2016     INFLUENZA VACCINE (1) 2020     FALL RISK ASSESSMENT  2021     MEDICARE ANNUAL WELLNESS VISIT  2021     MAMMO SCREENING  2022     COLORECTAL CANCER SCREENING  2023     LIPID  2025     ADVANCE CARE PLANNING  2025      DTAP/TDAP/TD IMMUNIZATION (3 - Td) 05/01/2028     DEXA  Completed     HEPATITIS C SCREENING  Completed     PHQ-2  Completed     ZOSTER IMMUNIZATION  Completed     IPV IMMUNIZATION  Aged Out     MENINGITIS IMMUNIZATION  Aged Out     HEPATITIS B IMMUNIZATION  Aged Out         Lab work is in process  Labs reviewed in EPIC  BP Readings from Last 3 Encounters:   09/08/20 123/80   06/30/20 (!) 143/88   01/07/20 (!) 144/78    Wt Readings from Last 3 Encounters:   09/08/20 123.4 kg (272 lb)   01/07/20 124.3 kg (274 lb)   05/07/19 126.1 kg (278 lb)                  Patient Active Problem List   Diagnosis     Stallings's esophagus with low grade dysplasia     Mixed stress and urge urinary incontinence     Pain in shoulder     Osteopenia     Onychogryposis and onychomycosis     Hyperlipidemia LDL goal <130     Other viral warts     Genital herpes     Advance Care Planning     Gastroesophageal reflux disease with esophagitis     Other dental procedure status     Arthritis of knee, right     Acute posthemorrhagic anemia     Physical deconditioning     Constipation     MARK (obstructive sleep apnea)(Mild AHI=5)     Calculus of gallbladder without cholecystitis without obstruction     Cholelithiases     Shortened CO interval     Morbid obesity (H)     SCC (squamous cell carcinoma), face     Bilateral leg edema     Essential hypertension     Past Surgical History:   Procedure Laterality Date     ARTHROPLASTY KNEE Right 6/10/2015    Procedure: ARTHROPLASTY KNEE;  Surgeon: Jorge Luis Snyder MD;  Location: [facility] OR     C NONSPECIFIC PROCEDURE  2004    ovarian cyst drainage, resection of fibroid tumors     C NONSPECIFIC PROCEDURE  2005    L cataract     C NONSPECIFIC PROCEDURE  6/08    L knee replacement     C RAD RESEC TONSIL/PILLARS  1953     C SHOULDER SURG PROC UNLISTED  2009    lt shoulder arthritis- replacement     C TOTAL KNEE ARTHROPLASTY      left knee total 08     ESOPHAGOSCOPY, GASTROSCOPY, DUODENOSCOPY (EGD), COMBINED N/A  2015    Procedure: COMBINED ESOPHAGOSCOPY, GASTROSCOPY, DUODENOSCOPY (EGD), BIOPSY SINGLE OR MULTIPLE;  Surgeon: Venkatesh Aguilera MD;  Location:  GI     ESOPHAGOSCOPY, GASTROSCOPY, DUODENOSCOPY (EGD), COMBINED N/A 2018    Procedure: COMBINED ESOPHAGOSCOPY, GASTROSCOPY, DUODENOSCOPY (EGD), BIOPSY SINGLE OR MULTIPLE;  gastroscopy;  Surgeon: Venkatesh Aguilera MD;  Location:  GI     LAPAROSCOPIC CHOLECYSTECTOMY N/A 2016    Procedure: LAPAROSCOPIC CHOLECYSTECTOMY;  Surgeon: Venkatesh Ford MD;  Location:  OR     SURGICAL HISTORY OF -       R cataract       Social History     Tobacco Use     Smoking status: Former Smoker     Last attempt to quit: 1985     Years since quittin.7     Smokeless tobacco: Never Used     Tobacco comment: 20 yrs smoking '65-, 1 PPD   Substance Use Topics     Alcohol use: Yes     Alcohol/week: 0.0 standard drinks     Comment: Occasional 2 beers weekly. 1-2 glasses wime monthly     Family History   Problem Relation Age of Onset     C.A.D. Father         triple bypass in his late 60's     Circulatory Father      Lipids Father      Obesity Father      C.A.D. Maternal Grandfather         death from MI in early 60s     Obesity Maternal Grandfather      C.A.D. Paternal Grandfather         MI in early 70s     Obesity Paternal Grandfather      Hypertension Mother      Arthritis Mother      Lipids Mother      Musculoskeletal Disorder Mother         double knee replacement     Obesity Mother      Cerebrovascular Disease Maternal Grandmother      Obesity Maternal Grandmother      Obesity Paternal Grandmother          Current Outpatient Medications   Medication Sig Dispense Refill     clotrimazole (LOTRIMIN) 1 % external cream APPLY TOPICALLY 2 TIMES DAILY as needed 60 g 2     simvastatin (ZOCOR) 20 MG tablet TAKE ONE TABLET BY MOUTH AT BEDTIME 90 tablet 3     Cholecalciferol (VITAMIN D3 PO) Take by mouth daily       DAILY MULTIVITAMIN PO DAILY       FISH  MG OR CAPS  "2 tablets daily       GLUCOSAMINE-CHONDROITIN PO        hydrochlorothiazide (HYDRODIURIL) 25 MG tablet Take 1 tablet (25 mg) by mouth daily 90 tablet 1     Ibuprofen (ADVIL PO)        omeprazole (PRILOSEC) 20 MG DR capsule Take 1 capsule (20 mg) by mouth 2 times daily 180 capsule 1     OTHER MEDICAL SUPPLIES Scar away silicone scar sheets       polyethylene glycol 0.4%- propylene glycol 0.3% (SYSTANE ULTRA) 0.4-0.3 % SOLN ophthalmic solution Place 1 drop into both eyes At Bedtime       polyethylene glycol 400 (BLINK TEARS) 0.25 % SOLN ophthalmic solution 1 drop       Scar Treatment Products (MEDERMA SPF 30 EX)        sucralfate (CARAFATE) 1 GM tablet Take 1 tablet (1 g) by mouth 4 times daily 40 tablet 1     tolterodine (DETROL) 2 MG tablet Take 1 tablet (2 mg) by mouth 2 times daily 180 tablet 1     valACYclovir (VALTREX) 500 MG tablet TAKE ONE TABLET BY MOUTH TWICE DAILY for 3 days 36 tablet 3     Allergies   Allergen Reactions     No Known Drug Allergies      Recent Labs   Lab Test 07/01/20  1007 05/07/19  1115 08/07/18  1448 05/01/18  1025 03/01/17  1226  06/28/16  1454   LDL 90 86  --  88 97  --   --    HDL 59 76  --  65 65  --   --    TRIG 97 91  --  100 107  --   --    ALT  --   --  22  --  19  --  26   CR 0.70 0.76 0.76 0.71 0.69   < > 0.76   GFRESTIMATED 85 78 74 81 83   < > 75   GFRESTBLACK >90 >90 >90 >90 >90  African American GFR Calc     < > >90   GFR Calc     POTASSIUM 3.6 3.8 3.4 3.6 4.7   < > 4.2   TSH  --   --   --   --  1.13  --   --     < > = values in this interval not displayed.          Review of Systems  Constitutional, HEENT, cardiovascular, pulmonary, gi and gu systems are negative, except as otherwise noted.    OBJECTIVE:   /80 (BP Location: Right arm, Cuff Size: Adult Large)   Pulse 87   Temp 98  F (36.7  C) (Oral)   Resp 16   Ht 1.702 m (5' 7\")   Wt 123.4 kg (272 lb)   SpO2 95%   BMI 42.60 kg/m   Estimated body mass index is 42.6 kg/m  as calculated from the " "following:    Height as of this encounter: 1.702 m (5' 7\").    Weight as of this encounter: 123.4 kg (272 lb).  Physical Exam  GENERAL APPEARANCE: healthy, alert and no distress  EYES: Eyes grossly normal to inspection, PERRL and conjunctivae and sclerae normal  HENT: ear canals and TM's normal, nose and mouth without ulcers or lesions, oropharynx clear and oral mucous membranes moist  NECK: no adenopathy, no asymmetry, masses, or scars and thyroid normal to palpation  RESP: lungs clear to auscultation - no rales, rhonchi or wheezes  BREAST: normal without masses, tenderness or nipple discharge and no palpable axillary masses or adenopathy  CV: regular rate and rhythm, normal S1 S2, no S3 or S4, no murmur, click or rub, no peripheral edema and peripheral pulses strong  ABDOMEN: soft, nontender, no hepatosplenomegaly, no masses and bowel sounds normal  MS: no musculoskeletal defects are noted and gait is age appropriate without ataxia  SKIN: no suspicious lesions or rashes  NEURO: Normal strength and tone, sensory exam grossly normal, mentation intact and speech normal  PSYCH: mentation appears normal and affect normal/bright    Diagnostic Test Results:  Labs reviewed in Epic    ASSESSMENT / PLAN:       ICD-10-CM    1. Routine general medical examination at a health care facility  Z00.00    2. Encounter for Medicare annual wellness exam  Z00.00    3. Hyperlipidemia LDL goal <130  E78.5 simvastatin (ZOCOR) 20 MG tablet   4. Intertriginous candidiasis  B37.2 clotrimazole (LOTRIMIN) 1 % external cream   5. Stallings's esophagus with low grade dysplasia  K22.710    6. Mixed stress and urge urinary incontinence  N39.46    7. Essential hypertension  I10    8. Bilateral leg edema  R60.0      CPE- Discussed diet, calcium and exercise.  Thin prep pap was not done.  Eye and dental care UTD or recommended f/u.  No immunizations needed today- she'll try and get flu shot next month.  See orders below for tests ordered and screening " "needed.      HTN/LE edema- on hydrochlorothiazide for BP and leg edema.  BP higher at home checks, but looks good today.  She's also been wearing compression stockings lately which helps a bit.  Will cont hydrochlorothiazide 25mg/d for now.    Lipids- 7/1/20 lipids look great, no se's, will cont on simvastatin 20mg/d.    GI f/u- f/u clear for upper GI- 3 yrs from last EGD.  Colonoscopy done in 6/18, tubular adenoma on pathology- no formal recs.  5yr f/u usually recommended (& this will be prior to her turning 80 yrs) -  updated.    Incontinence- Kegals - doing them correctly now, and thinks it's helping a bit.  Will try and do them more consistently and see if it helps more.  Still on detrol as well.    Clotrimazole rx - pt would like refills- rx sent.    Return in about 4 months (around 1/8/2021) for Annual Wellness Visit, Routine Visit/Chronic Cares/Med Check, Blood Pressure Recheck- bring monitor.       COUNSELING:  Reviewed preventive health counseling, as reflected in patient instructions    Estimated body mass index is 42.6 kg/m  as calculated from the following:    Height as of this encounter: 1.702 m (5' 7\").    Weight as of this encounter: 123.4 kg (272 lb).    Weight management plan: Discussed healthy diet and exercise guidelines    She reports that she quit smoking about 35 years ago. She has never used smokeless tobacco.      Appropriate preventive services were discussed with this patient, including applicable screening as appropriate for cardiovascular disease, diabetes, osteopenia/osteoporosis, and glaucoma.  As appropriate for age/gender, discussed screening for colorectal cancer, prostate cancer, breast cancer, and cervical cancer. Checklist reviewing preventive services available has been given to the patient.    Reviewed patients plan of care and provided an AVS. The Basic Care Plan (routine screening as documented in Health Maintenance) for Hiro meets the Care Plan requirement. This Care Plan " has been established and reviewed with the Patient.    Counseling Resources:  ATP IV Guidelines  Pooled Cohorts Equation Calculator  Breast Cancer Risk Calculator  Breast Cancer: Medication to Reduce Risk  FRAX Risk Assessment  ICSI Preventive Guidelines  Dietary Guidelines for Americans, 2010  USDA's MyPlate  ASA Prophylaxis  Lung CA Screening    Marie Robledo MD  [facility]    Identified Health Risks:

## 2020-09-08 NOTE — NURSING NOTE
"Chief Complaint   Patient presents with     Physical     Medicare Visit     initial /80 (BP Location: Right arm, Cuff Size: Adult Large)   Pulse 87   Temp 98  F (36.7  C) (Oral)   Resp 16   Ht 1.702 m (5' 7\")   Wt 123.4 kg (272 lb)   SpO2 95%   BMI 42.60 kg/m   Estimated body mass index is 42.6 kg/m  as calculated from the following:    Height as of this encounter: 1.702 m (5' 7\").    Weight as of this encounter: 123.4 kg (272 lb).  BP completed using cuff size: large.  L  R arm      Health Maintenance that is potentially due pending provider review:  NONE    n/a    Tramaine Kam ma  "

## 2020-09-08 NOTE — PATIENT INSTRUCTIONS
Preventive Health Recommendations    See your health care provider every year to    Review health changes.     Discuss preventive care.      Review your medicines if your doctor has prescribed any.      You no longer need a yearly Pap test unless you've had an abnormal Pap test in the past 10 years. If you have vaginal symptoms, such as bleeding or discharge, be sure to talk with your provider about a Pap test.      Every 1 to 2 years, have a mammogram.  If you are over 69, talk with your health care provider about whether or not you want to continue having screening mammograms.      Every 10 years, have a colonoscopy. Or, have a yearly FIT test (stool test). These exams will check for colon cancer.       Have a cholesterol test every 5 years, or more often if your doctor advises it.       Have a diabetes test (fasting glucose) every three years. If you are at risk for diabetes, you should have this test more often.       At age 65, have a bone density scan (DEXA) to check for osteoporosis (brittle bone disease).    Shots:    Get a flu shot each year.    Get a tetanus shot every 10 years.    Talk to your doctor about your pneumonia vaccines. There are now two you should receive - Pneumovax (PPSV 23) and Prevnar (PCV 13).    Talk to your pharmacist about the shingles vaccine.    Talk to your doctor about the hepatitis B vaccine.    Nutrition:     Eat at least 5 servings of fruits and vegetables each day.      Eat whole-grain bread, whole-wheat pasta and brown rice instead of white grains and rice.      Get adequate about Calcium and Vitamin D.     Lifestyle    Exercise at least 150 minutes a week (30 minutes a day, 5 days a week). This will help you control your weight and prevent disease.      Limit alcohol to one drink per day.      No smoking.       Wear sunscreen to prevent skin cancer.       See your dentist twice a year for an exam and cleaning.      See your eye doctor every 1 to 2 years to screen for  conditions such as glaucoma, macular degeneration, cataracts, etc.    Personalized Prevention Plan  You are due for the preventive services outlined below.  Your care team is available to assist you in scheduling these services.  If you have already completed any of these items, please share that information with your care team to update in your medical record.    Health Maintenance Due   Topic Date Due     Pneumococcal Vaccine (2 of 2 - PPSV23) 07/01/2016     Flu Vaccine (1) 09/01/2020     Patient Education   Personalized Prevention Plan  You are due for the preventive services outlined below.  Your care team is available to assist you in scheduling these services.  If you have already completed any of these items, please share that information with your care team to update in your medical record.  Health Maintenance Due   Topic Date Due     Pneumococcal Vaccine (2 of 2 - PPSV23) 07/01/2016     Flu Vaccine (1) 09/01/2020

## 2021-01-12 ENCOUNTER — OFFICE VISIT (OUTPATIENT)
Dept: FAMILY MEDICINE | Facility: CLINIC | Age: 76
End: 2021-01-12
Payer: COMMERCIAL

## 2021-01-12 VITALS
TEMPERATURE: 98 F | HEIGHT: 67 IN | BODY MASS INDEX: 42.06 KG/M2 | HEART RATE: 90 BPM | RESPIRATION RATE: 16 BRPM | DIASTOLIC BLOOD PRESSURE: 81 MMHG | OXYGEN SATURATION: 100 % | SYSTOLIC BLOOD PRESSURE: 122 MMHG | WEIGHT: 268 LBS

## 2021-01-12 DIAGNOSIS — I10 ESSENTIAL HYPERTENSION: ICD-10-CM

## 2021-01-12 DIAGNOSIS — N39.46 MIXED STRESS AND URGE URINARY INCONTINENCE: Primary | ICD-10-CM

## 2021-01-12 DIAGNOSIS — R60.0 BILATERAL LEG EDEMA: ICD-10-CM

## 2021-01-12 DIAGNOSIS — I87.2 STASIS DERMATITIS OF BOTH LEGS: ICD-10-CM

## 2021-01-12 PROCEDURE — 99214 OFFICE O/P EST MOD 30 MIN: CPT | Performed by: FAMILY MEDICINE

## 2021-01-12 RX ORDER — HYDROCHLOROTHIAZIDE 25 MG/1
25 TABLET ORAL DAILY
Qty: 90 TABLET | Refills: 1 | Status: SHIPPED | OUTPATIENT
Start: 2021-01-12 | End: 2021-11-08

## 2021-01-12 ASSESSMENT — MIFFLIN-ST. JEOR: SCORE: 1743.27

## 2021-01-12 NOTE — PROGRESS NOTES
Assessment & Plan       ICD-10-CM    1. Mixed stress and urge urinary incontinence  N39.46 UROLOGY ADULT REFERRAL   2. Bilateral leg edema  R60.0 hydrochlorothiazide (HYDRODIURIL) 25 MG tablet   3. Stasis dermatitis of both legs  I87.2 hydrochlorothiazide (HYDRODIURIL) 25 MG tablet   4. Essential hypertension  I10 hydrochlorothiazide (HYDRODIURIL) 25 MG tablet      --LE edema/HTN/Incontinence-  -Sx's of edema improved with compression stockings and hydrochlorothiazide,and BPs also improved on the hydrochlorothiazide.  Labs WNL in 7/20.  -Incontinence - mostly stress, infrequent urge incontinence.    She has also been on detrol for many yrs, reviewed notes from 2010/22, but unable to see notes addressing issue before/around time of initiation.  She's been unable to stop flow of urine to make sure Kegals are being done correctly.    Rec f/u with Dr. Mcgarry, urology at [facility], for her thoughts/recs.    Return in about 6 months (around 7/12/2021) for Physical Exam/Wellness visit.                             Return in about 6 months (around 7/12/2021) for Physical Exam/Wellness visit.    Marie Robledo MD  [facility]     Hiro Bloom is a 75 year old who presents to clinic today for the following health issues --    HPI       Hypertension Follow-up - on hydrochlorothiazide - first more for LE edema, but also for BP control.    Do you check your blood pressure regularly outside of the clinic? Yes     Are you following a low salt diet? Yes    Are your blood pressures ever more than 140 on the top number (systolic) OR more   than 90 on the bottom number (diastolic), for example 140/90? Yes    How many servings of fruits and vegetables do you eat daily?  2-3    On average, how many sweetened beverages do you drink each day (Examples: soda, juice, sweet tea, etc.  Do NOT count diet or artificially sweetened beverages)?   0    How many days per week do you exercise enough to make  "your heart beat faster? 4    How many minutes a day do you exercise enough to make your heart beat faster? 10 - 19    How many days per week do you miss taking your medication? 0    LE edema- better with hydrochlorothiazide and compression stockings.      Reviewed vaccines-   Had pneumonia-23 vaccine about a month prior to her 65 birthday.  Will try and clear the reminder from her HM.      Incontinence-   Tried doing Kegals, but not consistent enough with it- trying, but difficult.  Has tried, but isn't able to stop her urine mid stream.  Issues with sneezing, and sometimes leaking with feeling of urgency.  Usually just a little bit leaks out.  Has tried tolterodine- thinks it helps, but still has sx's.  No known se's.  Tolterodine rx sent in since 1/08 from our chart- likely on longer?        Review of Systems   Constitutional, HEENT, cardiovascular, pulmonary, gi and gu systems are negative, except as otherwise noted.      Objective    /81   Pulse 90   Temp 98  F (36.7  C) (Tympanic)   Resp 16   Ht 1.702 m (5' 7\")   Wt 121.6 kg (268 lb)   SpO2 100%   BMI 41.97 kg/m    Body mass index is 41.97 kg/m .  Physical Exam   GENERAL APPEARANCE: healthy, alert and no distress     EYES: PERRL, sclera clear     NECK: no adenopathy, no asymmetry, masses, or scars and thyroid normal to palpation     RESP: lungs clear to auscultation - no rales, rhonchi or wheezes     CV: regular rates and rhythm, normal S1 S2, no S3 or S4 and no murmur, click or rub      Abdomen: soft, nontender, no HSM or masses and bowel sounds normal     Ext: warm, dry, 1+ edema bilaterally in LE    Orders Only on 07/01/2020   Component Date Value Ref Range Status     Sodium 07/01/2020 138  133 - 144 mmol/L Final     Potassium 07/01/2020 3.6  3.4 - 5.3 mmol/L Final     Chloride 07/01/2020 106  94 - 109 mmol/L Final     Carbon Dioxide 07/01/2020 26  20 - 32 mmol/L Final     Anion Gap 07/01/2020 6  3 - 14 mmol/L Final     Glucose 07/01/2020 88  70 " - 99 mg/dL Final     Urea Nitrogen 07/01/2020 15  7 - 30 mg/dL Final     Creatinine 07/01/2020 0.70  0.52 - 1.04 mg/dL Final     GFR Estimate 07/01/2020 85  >60 mL/min/[1.73_m2] Final    Comment: Non  GFR Calc  Starting 12/18/2018, serum creatinine based estimated GFR (eGFR) will be   calculated using the Chronic Kidney Disease Epidemiology Collaboration   (CKD-EPI) equation.       GFR Estimate If Black 07/01/2020 >90  >60 mL/min/[1.73_m2] Final    Comment:  GFR Calc  Starting 12/18/2018, serum creatinine based estimated GFR (eGFR) will be   calculated using the Chronic Kidney Disease Epidemiology Collaboration   (CKD-EPI) equation.       Calcium 07/01/2020 9.4  8.5 - 10.1 mg/dL Final     Cholesterol 07/01/2020 168  <200 mg/dL Final     Triglycerides 07/01/2020 97  <150 mg/dL Final     HDL Cholesterol 07/01/2020 59  >49 mg/dL Final     LDL Cholesterol Calculated 07/01/2020 90  <100 mg/dL Final    Desirable:       <100 mg/dl     Non HDL Cholesterol 07/01/2020 109  <130 mg/dL Final     Creatinine Urine 07/01/2020 94  mg/dL Final     Albumin Urine mg/L 07/01/2020 <5  mg/L Final     Albumin Urine mg/g Cr 07/01/2020 Unable to calculate due to low value  0 - 25 mg/g Cr Final

## 2021-01-14 NOTE — TELEPHONE ENCOUNTER
MEDICAL RECORDS REQUEST   [facility]  [address] [place] [address]  PHONE: [phone]  Fax: [phone]        FUTURE VISIT INFORMATION                                                   Hiro Carranza, : 1945 scheduled for future visit at [facility]    APPOINTMENT INFORMATION:    Date: 2/3/2021 11:30AM    Provider:  Malgorzata PIÑA    Reason for Visit/Diagnosis: incontinence     REFERRAL INFORMATION:    Referring provider:  N/A    Specialty: N/A    Referring providers clinic:      Clinic contact number:  N/A    RECORDS REQUESTED FOR VISIT                                                     NOTES  STATUS/DETAILS   OFFICE NOTE from referring provider  yes   OFFICE NOTE from other specialist  no   DISCHARGE SUMMARY from hospital  no   DISCHARGE REPORT from the ER  no   OPERATIVE REPORT  no   MEDICATION LIST  no     PRE-VISIT CHECKLIST      Record collection complete Yes   Appointment appropriately scheduled           (right time/right provider) Yes   MyChart activation Yes   Questionnaire complete If no, please explain: In process      Completed by: Renita Patel

## 2021-01-15 ENCOUNTER — HEALTH MAINTENANCE LETTER (OUTPATIENT)
Age: 76
End: 2021-01-15

## 2021-01-27 ENCOUNTER — PRE VISIT (OUTPATIENT)
Dept: UROLOGY | Facility: CLINIC | Age: 76
End: 2021-01-27

## 2021-01-27 NOTE — TELEPHONE ENCOUNTER
Reason for Visit: Consult    Diagnosis: mixed incontinence    Orders/Procedures/Records: in system    Contact Patient: n/a    Rooming Requirements: normal      Silvana Mcconnell LPN  01/27/21  11:52 AM

## 2021-02-03 ENCOUNTER — VIRTUAL VISIT (OUTPATIENT)
Dept: UROLOGY | Facility: CLINIC | Age: 76
End: 2021-02-03
Payer: COMMERCIAL

## 2021-02-03 ENCOUNTER — PRE VISIT (OUTPATIENT)
Dept: UROLOGY | Facility: CLINIC | Age: 76
End: 2021-02-03

## 2021-02-03 DIAGNOSIS — N39.46 MIXED STRESS AND URGE URINARY INCONTINENCE: ICD-10-CM

## 2021-02-03 PROCEDURE — 99204 OFFICE O/P NEW MOD 45 MIN: CPT | Mod: 95 | Performed by: UROLOGY

## 2021-02-03 RX ORDER — MIRABEGRON 25 MG/1
25 TABLET, FILM COATED, EXTENDED RELEASE ORAL DAILY
Qty: 30 TABLET | Refills: 11 | Status: SHIPPED | OUTPATIENT
Start: 2021-02-03 | End: 2022-02-04

## 2021-02-03 NOTE — PROGRESS NOTES
Hiro Bloom is a 75 year old who is being evaluated via a billable video visit.      How would you like to obtain your AVS? Mail a Copy  If the video visit is dropped, the invitation should be resent by: Text to cell phone: [phone]  Will anyone else be joining your video visit? No      Video Start Time: 11:58 AM  Video-Visit Details    Type of service:  Video Visit    Video End Time:12:10 PM    Originating Location (pt. Location): Home    Distant Location (provider location):  [facility]     Platform used for Video Visit: Notifo

## 2021-02-03 NOTE — PATIENT INSTRUCTIONS
-vaginal Estrogen cream Rx  -Myrbetriq 25 mg add this to the oxybutynin  -referral to pelvic floor PT (she has tried to do on her own)  -f/u to review progress in 3 months and undergo cystoscopy

## 2021-02-03 NOTE — LETTER
2/3/2021       RE: Hiro Carranza  [address]  [facility] [id number]     Dear Colleague,    Thank you for referring your patient, Hiro Carranza, to the [facility] [place] [facility]. Please see a copy of my visit note below.    Hiro Bloom is a 75 year old who is being evaluated via a billable video visit.      How would you like to obtain your AVS? Mail a Copy  If the video visit is dropped, the invitation should be resent by: Text to cell phone: [phone]  Will anyone else be joining your video visit? No      Video Start Time: 11:58 AM  Video-Visit Details    Type of service:  Video Visit    Video End Time:12:10 PM    Originating Location (pt. Location): Home    Distant Location (provider location):  [facility] [place]     Platform used for Video Visit: Bilneur    CC:  Urinary incontinence    HPI:  Hiro Carranza is a 75 year old female asked to be seen in consultation by Dr. Robledo for the above.  This problem has been going on for many years and has been getting worse.  It is associated with urge incontinence but some stress incontinence as well.  She has been using tolterodine but it has not been working as well.  The patient voids q1-2 hours, nocturia X 2.  She drinks mainly water and diet coke and coffee.  She denies any dysuria,  hematuria, hesitancy, intermittency, feeling of incomplete emptying, or any recent hx of UTI's or stones.    The patient has some constipation with BM's qod.  She is not sexually active and denies any dyspareunia or pelvic pain.   She denies any vaginal bulge.  She has no neurological or balance problems.     Obstetric Hx:  She is .   Menopause around , HRT:  none    Past Medical History:   Diagnosis Date     Arthritis      Stallings esophagus 2008    omeprazole, f/u bx's in  (Dr. 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    cards EKG overread- benign unless pt develops palpitations       Past Surgical History:   Procedure Laterality Date     ARTHROPLASTY KNEE Right 6/10/2015    Procedure: ARTHROPLASTY KNEE;  Surgeon: Jorge Luis Snyder MD;  Location:  OR      RAD RESEC TONSIL/PILLARS  1953     C SHOULDER SURG PROC UNLISTED  2009    lt shoulder arthritis- replacement     C TOTAL KNEE ARTHROPLASTY      left knee total 08     ESOPHAGOSCOPY, GASTROSCOPY, DUODENOSCOPY (EGD), COMBINED N/A 5/21/2015    Procedure: COMBINED ESOPHAGOSCOPY, GASTROSCOPY, DUODENOSCOPY (EGD), BIOPSY SINGLE OR MULTIPLE;  Surgeon: Venkatesh Aguilera MD;  Location:  GI     ESOPHAGOSCOPY, GASTROSCOPY, DUODENOSCOPY (EGD), COMBINED N/A 6/21/2018    Procedure: COMBINED ESOPHAGOSCOPY, GASTROSCOPY, DUODENOSCOPY (EGD), BIOPSY SINGLE OR MULTIPLE;  gastroscopy;  Surgeon: Venkatesh Aguilera MD;  Location:  GI     LAPAROSCOPIC CHOLECYSTECTOMY N/A 8/4/2016    Procedure: LAPAROSCOPIC CHOLECYSTECTOMY;  Surgeon: Venkatesh Ford MD;  Location:  OR     SURGICAL HISTORY OF -   2008    R cataract     [facility] NONSPECIFIC PROCEDURE  2004    ovarian cyst drainage, resection of fibroid tumors     [facility] NONSPECIFIC PROCEDURE  2005    L cataract     [facility] NONSPECIFIC PROCEDURE  6/08    L knee replacement       Meds, Allergies, FHx and SHx reviewed per nurse's intake note.    ROS is negative on a 14 point scale except for some arthritis and knee pain.  All other positive and pertinent information is mentioned in the HPI.    PEx:   not currently breastfeeding.  Data Unavailable, There is no height or weight on file to calculate BMI., 0 lbs 0 oz  Gen appearance:  Well groomed  HEENT:  EOMI, AT  NC  Psych:  Normal Affect  Neuro:  A/O X 3  Skin:  Well perfused  Resp:  No increased respiratory effort  Vasc:  RRR  lymph:  No LE edema  Abd:  Soft/NT, ND, no palpable masses  Musk:  Full ROM in extremities  :  deferred    UA: UA RESULTS:  Recent Labs   Lab Test 07/01/15  1127   COLOR Yellow   APPEARANCE Clear   URINEGLC Negative   URINEBILI Negative   URINEKETONE Negative   SG 1.010   UBLD Negative   URINEPH 7.0   PROTEIN Negative   UROBILINOGEN 0.2   NITRITE Negative   LEUKEST Negative         ASSESSMENT and PLAN:  This is a 75 year old female with overactive bladder, mixed incontinence (urge and stress) as well as atrophic vaginitis.  Different management options were discussed with the patient including observation, PT, other medication, estrogen cream.  -vaginal Estrogen cream Rx  -Myrbetriq 25 mg add this to the oxybutynin  -referral to pelvic floor PT (she has tried to do on her own)  -f/u to review progress in 3 months and undergo cystoscopy    Thank you for allowing me to participate in Ms. Carranza's care.  I will keep you updated on her progress.    Dali Mcgarry MD    45 min spent on the date of the encounter in chart review, patient visit, review of tests, documentation and/or discussion with other providers about the issues documented above.

## 2021-02-03 NOTE — PROGRESS NOTES
CC:  Urinary incontinence    HPI:  Hiro Carranza is a 75 year old female asked to be seen in consultation by Dr. Robledo for the above.  This problem has been going on for many years and has been getting worse.  It is associated with urge incontinence but some stress incontinence as well.  She has been using tolterodine but it has not been working as well.  The patient voids q1-2 hours, nocturia X 2.  She drinks mainly water and diet coke and coffee.  She denies any dysuria,  hematuria, hesitancy, intermittency, feeling of incomplete emptying, or any recent hx of UTI's or stones.    The patient has some constipation with BM's qod.  She is not sexually active and denies any dyspareunia or pelvic pain.   She denies any vaginal bulge.  She has no neurological or balance problems.     Obstetric Hx:  She is .   Menopause around , HRT:  none    Past Medical History:   Diagnosis Date     Arthritis      Stallings esophagus 2008    omeprazole, f/u bx's in  (Dr. Aguilera), q3yr f/u, no dysplasia     Essential hypertension      GERD (gastroesophageal reflux disease)     omeprazole     Hyperlipidemia LDL goal < 160     simvastatin      Incontinence     detrol helping (stress & urge incontinence)     Incontinence of urine      Osteopenia 2009     SCC (squamous cell carcinoma), face 2018     Short NC-normal QRS complex syndrome 08    cards EKG overread- benign unless pt develops palpitations       Past Surgical History:   Procedure Laterality Date     ARTHROPLASTY KNEE Right 6/10/2015    Procedure: ARTHROPLASTY KNEE;  Surgeon: Jorge Luis Snyder MD;  Location:  OR     [facility] RESEC TONSIL/PILLARS       C SHOULDER SURG PROC UNLISTED      lt shoulder arthritis- replacement     C TOTAL KNEE ARTHROPLASTY      left knee total      ESOPHAGOSCOPY, GASTROSCOPY, DUODENOSCOPY (EGD), COMBINED N/A 2015    Procedure: COMBINED ESOPHAGOSCOPY, GASTROSCOPY, DUODENOSCOPY (EGD), BIOPSY SINGLE OR  MULTIPLE;  Surgeon: Venkatesh Aguilera MD;  Location:  GI     ESOPHAGOSCOPY, GASTROSCOPY, DUODENOSCOPY (EGD), COMBINED N/A 6/21/2018    Procedure: COMBINED ESOPHAGOSCOPY, GASTROSCOPY, DUODENOSCOPY (EGD), BIOPSY SINGLE OR MULTIPLE;  gastroscopy;  Surgeon: Venkatesh Aguilera MD;  Location:  GI     LAPAROSCOPIC CHOLECYSTECTOMY N/A 8/4/2016    Procedure: LAPAROSCOPIC CHOLECYSTECTOMY;  Surgeon: Venkatesh Ford MD;  Location:  OR     SURGICAL HISTORY OF -   2008    R cataract     [facility] NONSPECIFIC PROCEDURE  2004    ovarian cyst drainage, resection of fibroid tumors     [facility] NONSPECIFIC PROCEDURE  2005    L cataract     [facility] NONSPECIFIC PROCEDURE  6/08    L knee replacement       Meds, Allergies, FHx and SHx reviewed per nurse's intake note.    ROS is negative on a 14 point scale except for some arthritis and knee pain.  All other positive and pertinent information is mentioned in the HPI.    PEx:   not currently breastfeeding.  Data Unavailable, There is no height or weight on file to calculate BMI., 0 lbs 0 oz  Gen appearance:  Well groomed  HEENT:  EOMI, AT NC  Psych:  Normal Affect  Neuro:  A/O X 3  Skin:  Well perfused  Resp:  No increased respiratory effort  Vasc:  RRR  lymph:  No LE edema  Abd:  Soft/NT, ND, no palpable masses  Musk:  Full ROM in extremities  :  deferred    UA: UA RESULTS:  Recent Labs   Lab Test 07/01/15  1127   COLOR Yellow   APPEARANCE Clear   URINEGLC Negative   URINEBILI Negative   URINEKETONE Negative   SG 1.010   UBLD Negative   URINEPH 7.0   PROTEIN Negative   UROBILINOGEN 0.2   NITRITE Negative   LEUKEST Negative         ASSESSMENT and PLAN:  This is a 75 year old female with overactive bladder, mixed incontinence (urge and stress) as well as atrophic vaginitis.  Different management options were discussed with the patient including observation, PT, other medication, estrogen cream.  -vaginal Estrogen cream Rx  -Myrbetriq 25 mg add this to the oxybutynin  -referral to pelvic floor PT (she  has tried to do on her own)  -f/u to review progress in 3 months and undergo cystoscopy    Thank you for allowing me to participate in Ms. Carranza's care.  I will keep you updated on her progress.    Dali Mcgarry MD    45 min spent on the date of the encounter in chart review, patient visit, review of tests, documentation and/or discussion with other providers about the issues documented above.

## 2021-02-12 ENCOUNTER — IMMUNIZATION (OUTPATIENT)
Dept: PEDIATRICS | Facility: CLINIC | Age: 76
End: 2021-02-12
Payer: COMMERCIAL

## 2021-02-12 PROCEDURE — 91300 PR COVID VAC PFIZER DIL RECON 30 MCG/0.3 ML IM: CPT

## 2021-02-12 PROCEDURE — 0001A PR COVID VAC PFIZER DIL RECON 30 MCG/0.3 ML IM: CPT

## 2021-03-05 ENCOUNTER — IMMUNIZATION (OUTPATIENT)
Dept: PEDIATRICS | Facility: CLINIC | Age: 76
End: 2021-03-05
Attending: INTERNAL MEDICINE
Payer: COMMERCIAL

## 2021-03-05 PROCEDURE — 91300 PR COVID VAC PFIZER DIL RECON 30 MCG/0.3 ML IM: CPT

## 2021-03-05 PROCEDURE — 0002A PR COVID VAC PFIZER DIL RECON 30 MCG/0.3 ML IM: CPT

## 2021-04-06 ENCOUNTER — THERAPY VISIT (OUTPATIENT)
Dept: PHYSICAL THERAPY | Facility: CLINIC | Age: 76
End: 2021-04-06
Payer: COMMERCIAL

## 2021-04-06 DIAGNOSIS — N81.89 PELVIC FLOOR WEAKNESS IN FEMALE: ICD-10-CM

## 2021-04-06 DIAGNOSIS — N39.46 MIXED STRESS AND URGE URINARY INCONTINENCE: ICD-10-CM

## 2021-04-06 PROCEDURE — 97161 PT EVAL LOW COMPLEX 20 MIN: CPT | Mod: GP | Performed by: PHYSICAL THERAPIST

## 2021-04-06 PROCEDURE — 97110 THERAPEUTIC EXERCISES: CPT | Mod: GP | Performed by: PHYSICAL THERAPIST

## 2021-04-06 PROCEDURE — 97112 NEUROMUSCULAR REEDUCATION: CPT | Mod: GP | Performed by: PHYSICAL THERAPIST

## 2021-04-06 PROCEDURE — 97535 SELF CARE MNGMENT TRAINING: CPT | Mod: GP | Performed by: PHYSICAL THERAPIST

## 2021-04-06 NOTE — PROGRESS NOTES
Physical Therapy Initial Evaluation  Subjective:    Patient Health History  Hiro Bloom Lack being seen for Not sure what it's called..       Problem occurred: Related to urgent urination issues.   Pain is reported as 1/10 on pain scale.  General health as reported by patient is good.  Pertinent medical history includes: changes in bowel/bladder, calf pain-swelling-warmth, history of fractures, high blood pressure, menopausal, migraines/headaches, overweight, osteoarthritis, pain at night/rest, sleep disorder/apnea, smoking and other. Other medical history details: Upper GI precancerous cells, Gall bladder removal,.     Medical allergies: none.   Surgeries include:  Orthopedic surgery, cancer surgery and other. Other surgery history details: Tonsillectomy, ovarian cyst, uterine something.    Current medications:  High blood pressure medication, hormone replacement therapy and other. Other medications details: acid reflux, urination urgency, high cholesterol.    Current occupation is Retired.   Primary job tasks include:  Computer work and prolonged sitting.   Other job/home tasks details: Cleaning, laundry, household finances.                Therapist Generated HPI Evaluation  Problem details: MD order 2/3/21.    Hiro Bloom has been having urgency/ frequency and leaks for many years, the need for it has increased lately. She uses 2 pads/ day. She mentions she is at least having 2 leaks/ day- moderate leaks all during day time. She does mentions she has leaks with sneeze, coughing and urgency. She has not done PT before. She has no abdomen issues, constipation, bowel incontinence. She is not sexually active. She mentioned this to her provider and was sent to PT for further management. .         Type of problem:  Pelvic dysfunction and incontinence.    This is a chronic condition.    Where condition occurred: for unknown reasons.  Patient reports pain:  N/a.    Pain is worse during the day.  Since onset symptoms are  gradually worsening.  Symptoms are exacerbated by coughing, stress, laughing and sneezing (urgency)    Special tests included:  Other.    Barriers include:  None as reported by patient.                        Objective:  System                                 Pelvic Dysfunction Evaluation:        Flexibility:  normal      Abdominal Wall:  normal        Pelvic Clock Exam:  normal                External Assessment:    Skin Condition:  Normal    Bearing Down/Coughing:  Normal  Tissue Symmetry:  Normal  Introitus:  Normal  Muscle Contraction/Perineal Mobility:  Substitution and elevation and urogential triangle descent  Internal Assessment:  Internal assessment pelvic: laycock 2/6/6/5. Poor isoaltion with increased gluteal / adbominal substitution demonstrated with PFM contraction.   Sensory Exam:  Normal  Contraction/Grade:  Weak squeeze, 2 second hold (2)          Additional History:    Number of Pregnancies: 0  Number of Live Births: 0                       General     ROS    Assessment/Plan:    Patient is a 75 year old female with pelvic complaints.    Patient has the following significant findings with corresponding treatment plan.                Diagnosis 1:  PFM weakness- Mixed UI  Decreased strength - therapeutic exercise, therapeutic activities and home program  Decreased function - therapeutic activities and home program    Therapy Evaluation Codes:   1) History comprised of:   Personal factors that impact the plan of care:      Time since onset of symptoms.    Comorbidity factors that impact the plan of care are:      check hPI.     Medications impacting care: check HPI.  2) Examination of Body Systems comprised of:   Body structures and functions that impact the plan of care:      Pelvis.   Activity limitations that impact the plan of care are:      Stress incontinence, Urgency and Urge incontinence.  3) Clinical presentation characteristics are:   Stable/Uncomplicated.  4) Decision-Making    Low complexity  using standardized patient assessment instrument and/or measureable assessment of functional outcome.  Cumulative Therapy Evaluation is: Low complexity.    Previous and current functional limitations:  (See Goal Flow Sheet for this information)    Short term and Long term goals: (See Goal Flow Sheet for this information)     Communication ability:  Patient appears to be able to clearly communicate and understand verbal and written communication and follow directions correctly.  Treatment Explanation - The following has been discussed with the patient:   RX ordered/plan of care  Anticipated outcomes  Possible risks and side effects  This patient would benefit from PT intervention to resume normal activities.   Rehab potential is good.    Frequency:  1 X week, once daily  Duration:  for 3 weeks tapering to 2 X a month over 2 months  Discharge Plan:  Achieve all LTG.  Independent in home treatment program.  Reach maximal therapeutic benefit.    Please refer to the daily flowsheet for treatment today, total treatment time and time spent performing 1:1 timed codes.

## 2021-04-13 ENCOUNTER — THERAPY VISIT (OUTPATIENT)
Dept: PHYSICAL THERAPY | Facility: CLINIC | Age: 76
End: 2021-04-13
Payer: COMMERCIAL

## 2021-04-13 DIAGNOSIS — N81.89 PELVIC FLOOR WEAKNESS IN FEMALE: ICD-10-CM

## 2021-04-13 PROCEDURE — 97112 NEUROMUSCULAR REEDUCATION: CPT | Mod: GP | Performed by: PHYSICAL THERAPIST

## 2021-04-13 PROCEDURE — 97110 THERAPEUTIC EXERCISES: CPT | Mod: GP | Performed by: PHYSICAL THERAPIST

## 2021-04-19 ENCOUNTER — THERAPY VISIT (OUTPATIENT)
Dept: PHYSICAL THERAPY | Facility: CLINIC | Age: 76
End: 2021-04-19
Payer: COMMERCIAL

## 2021-04-19 DIAGNOSIS — N81.89 PELVIC FLOOR WEAKNESS IN FEMALE: ICD-10-CM

## 2021-04-19 PROCEDURE — 97112 NEUROMUSCULAR REEDUCATION: CPT | Mod: GP | Performed by: PHYSICAL THERAPIST

## 2021-04-19 PROCEDURE — 97110 THERAPEUTIC EXERCISES: CPT | Mod: GP | Performed by: PHYSICAL THERAPIST

## 2021-04-27 ENCOUNTER — THERAPY VISIT (OUTPATIENT)
Dept: PHYSICAL THERAPY | Facility: CLINIC | Age: 76
End: 2021-04-27
Payer: COMMERCIAL

## 2021-04-27 DIAGNOSIS — N81.89 PELVIC FLOOR WEAKNESS IN FEMALE: ICD-10-CM

## 2021-04-27 PROCEDURE — 97112 NEUROMUSCULAR REEDUCATION: CPT | Mod: GP | Performed by: PHYSICAL THERAPIST

## 2021-04-27 PROCEDURE — 97110 THERAPEUTIC EXERCISES: CPT | Mod: GP | Performed by: PHYSICAL THERAPIST

## 2021-05-08 ENCOUNTER — NURSE TRIAGE (OUTPATIENT)
Dept: NURSING | Facility: CLINIC | Age: 76
End: 2021-05-08
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
## 2021-05-10 ENCOUNTER — TRANSFERRED RECORDS (OUTPATIENT)
Dept: HEALTH INFORMATION MANAGEMENT | Facility: CLINIC | Age: 76
End: 2021-05-10

## 2021-05-14 ENCOUNTER — TELEPHONE (OUTPATIENT)
Dept: FAMILY MEDICINE | Facility: CLINIC | Age: 76
End: 2021-05-14

## 2021-05-14 ENCOUNTER — OFFICE VISIT (OUTPATIENT)
Dept: FAMILY MEDICINE | Facility: CLINIC | Age: 76
End: 2021-05-14
Payer: COMMERCIAL

## 2021-05-14 VITALS
SYSTOLIC BLOOD PRESSURE: 112 MMHG | TEMPERATURE: 98.7 F | RESPIRATION RATE: 16 BRPM | HEART RATE: 102 BPM | WEIGHT: 275 LBS | DIASTOLIC BLOOD PRESSURE: 78 MMHG | BODY MASS INDEX: 43.16 KG/M2 | HEIGHT: 67 IN | OXYGEN SATURATION: 96 %

## 2021-05-14 DIAGNOSIS — S80.10XA: Primary | ICD-10-CM

## 2021-05-14 DIAGNOSIS — I87.2 EDEMA OF BOTH LOWER EXTREMITIES DUE TO PERIPHERAL VENOUS INSUFFICIENCY: ICD-10-CM

## 2021-05-14 LAB
ANION GAP SERPL CALCULATED.3IONS-SCNC: 6 MMOL/L (ref 3–14)
BUN SERPL-MCNC: 16 MG/DL (ref 7–30)
CALCIUM SERPL-MCNC: 9 MG/DL (ref 8.5–10.1)
CHLORIDE SERPL-SCNC: 101 MMOL/L (ref 94–109)
CO2 SERPL-SCNC: 30 MMOL/L (ref 20–32)
CREAT SERPL-MCNC: 0.7 MG/DL (ref 0.52–1.04)
GFR SERPL CREATININE-BSD FRML MDRD: 84 ML/MIN/{1.73_M2}
GLUCOSE SERPL-MCNC: 84 MG/DL (ref 70–99)
POTASSIUM SERPL-SCNC: 3.6 MMOL/L (ref 3.4–5.3)
SODIUM SERPL-SCNC: 137 MMOL/L (ref 133–144)

## 2021-05-14 PROCEDURE — 80048 BASIC METABOLIC PNL TOTAL CA: CPT | Performed by: FAMILY MEDICINE

## 2021-05-14 PROCEDURE — 99214 OFFICE O/P EST MOD 30 MIN: CPT | Performed by: FAMILY MEDICINE

## 2021-05-14 PROCEDURE — 36415 COLL VENOUS BLD VENIPUNCTURE: CPT | Performed by: FAMILY MEDICINE

## 2021-05-14 RX ORDER — FUROSEMIDE 20 MG
20 TABLET ORAL DAILY
Qty: 30 TABLET | Refills: 0 | Status: SHIPPED | OUTPATIENT
Start: 2021-05-14 | End: 2021-06-02

## 2021-05-14 ASSESSMENT — MIFFLIN-ST. JEOR: SCORE: 1775.02
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
## 2021-05-17 ENCOUNTER — TRANSFERRED RECORDS (OUTPATIENT)
Dept: HEALTH INFORMATION MANAGEMENT | Facility: CLINIC | Age: 76
End: 2021-05-17

## 2021-05-17 ENCOUNTER — TELEPHONE (OUTPATIENT)
Dept: FAMILY MEDICINE | Facility: CLINIC | Age: 76
End: 2021-05-17

## 2021-05-17 NOTE — TELEPHONE ENCOUNTER
Pt calling to schedule a visit on Thursday with CW or JF for a 3 day f/u after seeing ER in [place] for wound on back of leg.  Scheduled her in on Thursday per her request.    Called [place] ER and latest ER notes will be faxed to Mabel [facility] fax when complete.  ER note is from today so not complete yet.     Namita Love RN
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
## 2021-05-20 ENCOUNTER — OFFICE VISIT (OUTPATIENT)
Dept: FAMILY MEDICINE | Facility: CLINIC | Age: 76
End: 2021-05-20
Payer: COMMERCIAL

## 2021-05-20 VITALS
DIASTOLIC BLOOD PRESSURE: 84 MMHG | SYSTOLIC BLOOD PRESSURE: 156 MMHG | RESPIRATION RATE: 22 BRPM | OXYGEN SATURATION: 97 % | HEIGHT: 67 IN | WEIGHT: 271.5 LBS | HEART RATE: 103 BPM | TEMPERATURE: 99.8 F | BODY MASS INDEX: 42.61 KG/M2

## 2021-05-20 DIAGNOSIS — L97.919 STASIS ULCER OF RIGHT LOWER EXTREMITY (H): ICD-10-CM

## 2021-05-20 DIAGNOSIS — R60.0 BILATERAL LEG EDEMA: Primary | ICD-10-CM

## 2021-05-20 DIAGNOSIS — I83.019 STASIS ULCER OF RIGHT LOWER EXTREMITY (H): ICD-10-CM

## 2021-05-20 PROCEDURE — 99214 OFFICE O/P EST MOD 30 MIN: CPT | Performed by: FAMILY MEDICINE

## 2021-05-20 RX ORDER — CEPHALEXIN 500 MG/1
500 CAPSULE ORAL 4 TIMES DAILY
Qty: 28 CAPSULE | Refills: 0 | Status: SHIPPED | OUTPATIENT
Start: 2021-05-20 | End: 2021-05-27

## 2021-05-20 RX ORDER — TIZANIDINE 2 MG/1
TABLET ORAL
COMMUNITY
Start: 2021-05-10 | End: 2021-09-16

## 2021-05-20 RX ORDER — FUROSEMIDE 20 MG
20 TABLET ORAL DAILY
COMMUNITY
End: 2021-05-28

## 2021-05-20 RX ORDER — CEPHALEXIN 500 MG/1
CAPSULE ORAL PRN
COMMUNITY
Start: 2021-05-17

## 2021-05-20 ASSESSMENT — MIFFLIN-ST. JEOR: SCORE: 1759.15

## 2021-05-21 ENCOUNTER — TELEPHONE (OUTPATIENT)
Dept: FAMILY MEDICINE | Facility: CLINIC | Age: 76
End: 2021-05-21

## 2021-05-21 NOTE — TELEPHONE ENCOUNTER
JF,    Pt says the Tegaderm has a lot of pooled blood in it today that she noticed.  Wanting to know if she should change the Tegaderm and use the other one she has or use another type of more absorbent bandage.    Please advise.  Thanks,  Namita Love RN
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
## 2021-05-28 ENCOUNTER — OFFICE VISIT (OUTPATIENT)
Dept: FAMILY MEDICINE | Facility: CLINIC | Age: 76
End: 2021-05-28
Payer: COMMERCIAL

## 2021-05-28 ENCOUNTER — TELEPHONE (OUTPATIENT)
Dept: FAMILY MEDICINE | Facility: CLINIC | Age: 76
End: 2021-05-28

## 2021-05-28 ENCOUNTER — TELEPHONE (OUTPATIENT)
Dept: OTHER | Facility: CLINIC | Age: 76
End: 2021-05-28

## 2021-05-28 ENCOUNTER — ANCILLARY PROCEDURE (OUTPATIENT)
Dept: ULTRASOUND IMAGING | Facility: CLINIC | Age: 76
End: 2021-05-28
Attending: FAMILY MEDICINE
Payer: COMMERCIAL

## 2021-05-28 VITALS
BODY MASS INDEX: 42.38 KG/M2 | OXYGEN SATURATION: 96 % | RESPIRATION RATE: 17 BRPM | DIASTOLIC BLOOD PRESSURE: 72 MMHG | WEIGHT: 270 LBS | HEIGHT: 67 IN | HEART RATE: 113 BPM | SYSTOLIC BLOOD PRESSURE: 138 MMHG | TEMPERATURE: 96.8 F
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
## 2021-05-28 DIAGNOSIS — R60.0 LOWER EXTREMITY EDEMA: ICD-10-CM

## 2021-05-28 DIAGNOSIS — S81.801D OPEN WOUND OF RIGHT LOWER EXTREMITY, SUBSEQUENT ENCOUNTER: ICD-10-CM

## 2021-05-28 DIAGNOSIS — S81.801D OPEN WOUND OF RIGHT LOWER EXTREMITY, SUBSEQUENT ENCOUNTER: Primary | ICD-10-CM

## 2021-05-28 DIAGNOSIS — R20.2 PARESTHESIA: ICD-10-CM

## 2021-05-28 LAB
ALBUMIN SERPL-MCNC: 3.2 G/DL (ref 3.4–5)
ALP SERPL-CCNC: 138 U/L (ref 40–150)
ALT SERPL W P-5'-P-CCNC: 17 U/L (ref 0–50)
ANION GAP SERPL CALCULATED.3IONS-SCNC: 8 MMOL/L (ref 3–14)
AST SERPL W P-5'-P-CCNC: 20 U/L (ref 0–45)
BASOPHILS # BLD AUTO: 0 10E9/L (ref 0–0.2)
BASOPHILS NFR BLD AUTO: 0.2 %
BILIRUB SERPL-MCNC: 0.5 MG/DL (ref 0.2–1.3)
BUN SERPL-MCNC: 15 MG/DL (ref 7–30)
CALCIUM SERPL-MCNC: 9.4 MG/DL (ref 8.5–10.1)
CHLORIDE SERPL-SCNC: 100 MMOL/L (ref 94–109)
CK SERPL-CCNC: 62 U/L (ref 30–225)
CO2 SERPL-SCNC: 30 MMOL/L (ref 20–32)
CREAT SERPL-MCNC: 0.68 MG/DL (ref 0.52–1.04)
DIFFERENTIAL METHOD BLD: NORMAL
EOSINOPHIL # BLD AUTO: 0.1 10E9/L (ref 0–0.7)
EOSINOPHIL NFR BLD AUTO: 1.3 %
ERYTHROCYTE [DISTWIDTH] IN BLOOD BY AUTOMATED COUNT: 14.5 % (ref 10–15)
GFR SERPL CREATININE-BSD FRML MDRD: 85 ML/MIN/{1.73_M2}
GLUCOSE SERPL-MCNC: 78 MG/DL (ref 70–99)
HCT VFR BLD AUTO: 38.6 % (ref 35–47)
HGB BLD-MCNC: 12.3 G/DL (ref 11.7–15.7)
LYMPHOCYTES # BLD AUTO: 1.7 10E9/L (ref 0.8–5.3)
LYMPHOCYTES NFR BLD AUTO: 19.3 %
MCH RBC QN AUTO: 29.8 PG (ref 26.5–33)
MCHC RBC AUTO-ENTMCNC: 31.9 G/DL (ref 31.5–36.5)
MCV RBC AUTO: 94 FL (ref 78–100)
MONOCYTES # BLD AUTO: 0.8 10E9/L (ref 0–1.3)
MONOCYTES NFR BLD AUTO: 8.5 %
NEUTROPHILS # BLD AUTO: 6.3 10E9/L (ref 1.6–8.3)
NEUTROPHILS NFR BLD AUTO: 70.7 %
PLATELET # BLD AUTO: 382 10E9/L (ref 150–450)
POTASSIUM SERPL-SCNC: 3.5 MMOL/L (ref 3.4–5.3)
PROT SERPL-MCNC: 7.9 G/DL (ref 6.8–8.8)
RBC # BLD AUTO: 4.13 10E12/L (ref 3.8–5.2)
SODIUM SERPL-SCNC: 138 MMOL/L (ref 133–144)
WBC # BLD AUTO: 9 10E9/L (ref 4–11)

## 2021-05-28 PROCEDURE — 80053 COMPREHEN METABOLIC PANEL: CPT | Performed by: FAMILY MEDICINE

## 2021-05-28 PROCEDURE — 85025 COMPLETE CBC W/AUTO DIFF WBC: CPT | Performed by: FAMILY MEDICINE

## 2021-05-28 PROCEDURE — 82550 ASSAY OF CK (CPK): CPT | Performed by: FAMILY MEDICINE

## 2021-05-28 PROCEDURE — 36415 COLL VENOUS BLD VENIPUNCTURE: CPT | Performed by: FAMILY MEDICINE

## 2021-05-28 PROCEDURE — 93971 EXTREMITY STUDY: CPT | Mod: RT | Performed by: RADIOLOGY

## 2021-05-28 PROCEDURE — 99215 OFFICE O/P EST HI 40 MIN: CPT | Performed by: FAMILY MEDICINE

## 2021-05-28 ASSESSMENT — MIFFLIN-ST. JEOR: SCORE: 1752.34

## 2021-05-28 NOTE — TELEPHONE ENCOUNTER
Was asked by CW to schedule pt for US  If AM - [place]   If PM - [place]  Called radiology   [facility] has no AM openings  Scheduled pt for US today at [place] arrive 315pm, US at 330pm    Will also route to triage to f/u with pt Tuesday or Wednesday to make sure made a vascular appt    Bernadette MCCULLOUGH RN
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
## 2021-05-31 NOTE — RESULT ENCOUNTER NOTE
Stat right LE ultrasound- discussed with radiology, reported no clot, but pt does have a large ~15cm hematoma in upper calf.     Called and discussed results with pt.  Reassuring that the ultrasound exam and lab help r/o DVT, cellulitis, or compartment syndrome.  Sx's likely due to the large hematoma, making the edema and wound worse.  Rec keeping up with the current wound care, keep legs up above heart as much as possible, and f/u with the referral to vascular medicine.  Pt agrees with plan.  CW

## 2021-06-01 DIAGNOSIS — S80.10XA: ICD-10-CM

## 2021-06-01 DIAGNOSIS — I87.2 EDEMA OF BOTH LOWER EXTREMITIES DUE TO PERIPHERAL VENOUS INSUFFICIENCY: ICD-10-CM

## 2021-06-01 RX ORDER — CEPHALEXIN 500 MG/1
CAPSULE ORAL
Status: CANCELLED | OUTPATIENT
Start: 2021-06-01

## 2021-06-01 NOTE — TELEPHONE ENCOUNTER
Reason for Call:  Medication or medication refill:    Do you use a [facility]?  Name of the pharmacy and phone number for the current request:  [id number] - [place], [place] - [address]    Name of the medication requested: furosemide (LASIX) 20 MG tablet    Other request: cephALEXin (KEFLEX) 500 MG capsule would also like a refill of this medication if possible?    Can we leave a detailed message on this number? Not Applicable    Phone number patient can be reached at: Other phone number: FAX: [phone]    Best Time: ANY    Call taken on 6/1/2021 at 11:13 AM by Kaylynn Perdue

## 2021-06-02 RX ORDER — FUROSEMIDE 20 MG
20 TABLET ORAL DAILY
Qty: 20 TABLET | Refills: 0 | Status: SHIPPED | OUTPATIENT
Start: 2021-06-02 | End: 2021-09-16

## 2021-06-02 NOTE — TELEPHONE ENCOUNTER
Glad to hear the area is softening a bit.       She can finish the abx course, but I would not extend the antibiotics as it is very unlikely to be helpful.      The lasix is meant to be short term as well, and it's hard to know if it's helping at this point.    Will send in #20, but I would have her try off for 2-3 days, and can restart if swelling worsens again, but the swelling is almost surely due to the blood collection in her calf (and blocking fluid reuptake).    She can ask at her 6/12/21 vascular appt if they think she should still be on the lasix or not (but it would be good to try off for at least a bit prior to that appt).    Thanks!  CW

## 2021-06-02 NOTE — TELEPHONE ENCOUNTER
CW,   Spoke with pt about Keflex and Lasix refill requests     Lasix:   States swelling is still pretty big to calf   Does look a little better she said   Is starting to soften too - not as brick hard anymore     Keflex:   Denies fever  Wonders if needs more abx since knee replacement to leg swelling   Will complete course today (has 4 pills left to take)    Please advise   Thanks,  Bernadette MCCULLOUGH RN

## 2021-07-07 PROBLEM — N81.89 PELVIC FLOOR WEAKNESS IN FEMALE: Status: RESOLVED | Noted: 2021-04-06 | Resolved: 2021-07-07

## 2021-07-07 NOTE — PROGRESS NOTES
Discharge Note    Progress reporting period is from last progress note on   to Apr 19, 2021.    Hiro failed to follow up and current status is unknown.  Please see information below for last relevant information on current status.  Patient seen for 3 visits.    SUBJECTIVE  Subjective changes noted by patient:  [place] Bloom mentions she is having minimal leaks last week- 2-3 episodes just small leaks mentioned.   .  Current pain level is  .     Previous pain level was   .   Changes in function:  Yes (See Goal flowsheet attached for changes in current functional level)  Adverse reaction to treatment or activity: None    OBJECTIVE  Changes noted in objective findings: Laycock 2+/10/10/10, Improved PFm siolation dmeonstrated with stregnth of contraction improved. Patient wants to stick with smae  exercises for next week.      ASSESSMENT/PLAN  Diagnosis: PFM weakness- Mixed UI   Updated problem list and treatment plan:     STG/LTGs have been met or progress has been made towards goals:  Yes, please see goal flowsheet for most current information  Assessment of Progress: current status is unknown.    Last current status: Pt is progressing as expected   Self Management Plans:  HEP  I have re-evaluated this patient and find that the nature, scope, duration and intensity of the therapy is appropriate for the medical condition of the patient.  Hiro continues to require the following intervention to meet STG and LTG's:  HEP.    Recommendations:  Discharge with current home program.  Patient to follow up with MD as needed.    Please refer to the daily flowsheet for treatment today, total treatment time and time spent performing 1:1 timed codes.

## 2021-07-12 ENCOUNTER — PRE VISIT (OUTPATIENT)
Dept: UROLOGY | Facility: CLINIC | Age: 76
End: 2021-07-12

## 2021-07-12 NOTE — TELEPHONE ENCOUNTER
Reason for Visit: Cystoscopy    Diagnosis: Mixed stress and urge urinary incontinence    Orders/Procedures/Records: in system    Contact Patient: n/a    Rooming Requirements: UA dip prior to getting ready for cystoscopy. If positive for Leuks and/or Nitrites, will not do cystoscopy. If positive, send urine for official UA / UC.      Robbie Ervin  07/12/21  10:08 AM

## 2021-07-14 ENCOUNTER — OFFICE VISIT (OUTPATIENT)
Dept: UROLOGY | Facility: CLINIC | Age: 76
End: 2021-07-14
Payer: COMMERCIAL

## 2021-07-14 VITALS
WEIGHT: 276 LBS | BODY MASS INDEX: 43.32 KG/M2 | HEIGHT: 67 IN | SYSTOLIC BLOOD PRESSURE: 142 MMHG | HEART RATE: 91 BPM | DIASTOLIC BLOOD PRESSURE: 82 MMHG

## 2021-07-14 DIAGNOSIS — N95.2 ATROPHIC VAGINITIS: ICD-10-CM

## 2021-07-14 DIAGNOSIS — N39.3 STRESS INCONTINENCE: ICD-10-CM

## 2021-07-14 DIAGNOSIS — N39.41 URGE INCONTINENCE: ICD-10-CM

## 2021-07-14 DIAGNOSIS — N39.46 MIXED STRESS AND URGE URINARY INCONTINENCE: Primary | ICD-10-CM

## 2021-07-14 PROCEDURE — 52000 CYSTOURETHROSCOPY: CPT | Performed by: UROLOGY

## 2021-07-14 RX ORDER — LIDOCAINE HYDROCHLORIDE 20 MG/ML
JELLY TOPICAL ONCE
Status: COMPLETED | OUTPATIENT
Start: 2021-07-14 | End: 2021-07-14

## 2021-07-14 RX ORDER — TOLTERODINE 4 MG/1
4 CAPSULE, EXTENDED RELEASE ORAL DAILY
Qty: 30 CAPSULE | Refills: 11 | Status: SHIPPED | OUTPATIENT
Start: 2021-07-14 | End: 2022-08-05

## 2021-07-14 RX ADMIN — LIDOCAINE HYDROCHLORIDE: 20 JELLY TOPICAL at 11:18

## 2021-07-14 ASSESSMENT — MIFFLIN-ST. JEOR: SCORE: 1779.56

## 2021-07-14 ASSESSMENT — PAIN SCALES - GENERAL: PAINLEVEL: NO PAIN (0)
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
## 2021-07-14 NOTE — NURSING NOTE
"Chief Complaint   Patient presents with     Cystoscopy       Blood pressure (!) 142/82, pulse 91, height 1.702 m (5' 7\"), weight 125.2 kg (276 lb), not currently breastfeeding. Body mass index is 43.23 kg/m .    Patient Active Problem List   Diagnosis     Stallings's esophagus with low grade dysplasia     Mixed stress and urge urinary incontinence     Pain in shoulder     Osteopenia     Onychogryposis and onychomycosis     Hyperlipidemia LDL goal <130     Other viral warts     Genital herpes     Advance Care Planning     Gastroesophageal reflux disease with esophagitis     Other dental procedure status     Arthritis of knee, right     Acute posthemorrhagic anemia     Physical deconditioning     Constipation     MARK (obstructive sleep apnea)(Mild AHI=5)     Calculus of gallbladder without cholecystitis without obstruction     Cholelithiases     Shortened TX interval     Morbid obesity (H)     SCC (squamous cell carcinoma), face     Bilateral leg edema     Essential hypertension       Allergies   Allergen Reactions     No Known Drug Allergies        Current Outpatient Medications   Medication Sig Dispense Refill     cephALEXin (KEFLEX) 500 MG capsule TAKE 1 CAPSULE BY MOUTH FOUR TIMES DAILY UNTIL GONE.       Cholecalciferol (VITAMIN D3 PO) Take by mouth daily       clotrimazole (LOTRIMIN) 1 % external cream APPLY TOPICALLY 2 TIMES DAILY as needed 60 g 2     DAILY MULTIVITAMIN PO DAILY       FISH  MG OR CAPS 2 tablets daily       GLUCOSAMINE-CHONDROITIN PO        hydrochlorothiazide (HYDRODIURIL) 25 MG tablet Take 1 tablet (25 mg) by mouth daily 90 tablet 1     Ibuprofen (ADVIL PO)        mirabegron (MYRBETRIQ) 25 MG 24 hr tablet Take 1 tablet (25 mg) by mouth daily 30 tablet 11     omeprazole (PRILOSEC) 20 MG DR capsule TAKE ONE CAPSULE BY MOUTH TWICE DAILY  180 capsule 0     polyethylene glycol 400 (BLINK TEARS) 0.25 % SOLN ophthalmic solution 1 drop       simvastatin (ZOCOR) 20 MG tablet TAKE ONE TABLET BY " MOUTH AT BEDTIME 90 tablet 3     tolterodine (DETROL) 2 MG tablet TAKE ONE TABLET BY MOUTH TWICE DAILY  180 tablet 0     valACYclovir (VALTREX) 500 MG tablet TAKE ONE TABLET BY MOUTH TWICE DAILY for 3 days 36 tablet 3     COMPOUNDED NON-CONTROLLED SUBSTANCE (CMPD RX) - PHARMACY TO MIX COMPOUNDED MEDICATION Estriol 1 mg/g Apply small amount to finger and apply to inside vagina daily for 2 weeks then twice weekly Route: vaginally (Patient not taking: Reported on 2021) 30 g 11     furosemide (LASIX) 20 MG tablet Take 1 tablet (20 mg) by mouth daily (Patient not taking: Reported on 2021) 20 tablet 0     tiZANidine (ZANAFLEX) 2 MG tablet TAKE ONE TABLET BY MOUTH EVERY 6 TO 8 HOURS AS NEEDED FOR MUSCLE RELAXANT (Patient not taking: Reported on 2021)         Social History     Tobacco Use     Smoking status: Former Smoker     Quit date: 1985     Years since quittin.5     Smokeless tobacco: Never Used     Tobacco comment: 20 yrs smoking '65-85, 1 PPD   Substance Use Topics     Alcohol use: Yes     Alcohol/week: 0.0 standard drinks     Comment: Occasional 2 beers weekly. 1-2 glasses wime monthly     Drug use: No       Invasive Procedure Safety Checklist:    Procedure: Cystoscopy    Action: Complete sections and checkboxes as appropriate.    Pre-procedure:  1. Patient ID Verified with 2 identifiers (Salma and  or MRN) : YES    2. Procedure and site verified with patient/designee (when able) : YES    3. Accurate consent documentation in medical record : YES    4. H&P (or appropriate assessment) documented in medical record : N/A  H&P must be up to 30 days prior to procedure an updated within 24 hours of                 Procedure as applicable.     5. Relevant diagnostic and radiology test results appropriately labeled and displayed as applicable : YES    6. Blood products, implants, devices, and/or special equipment available for the procedure as applicable : YES    7. Procedure site(s) marked with  provider initials [Exclusions: none] : NO    8. Marking not required. Reason : Yes  Procedure does not require site marking    Time Out:     Time-Out performed immediately prior to starting procedure, including verbal and active participation of all team members addressing: YES    1. Correct patient identity.  2. Confirmed that the correct side and site are marked.  3. An accurate procedure to be done.  4. Agreement on the procedure to be done.  5. Correct patient position.  6. Relevant images and results are properly labeled and appropriately displayed.  7. The need to administer antibiotics or fluids for irrigation purposes during the procedure as applicable.  8. Safety precautions based on patient history or medication use.    During Procedure: Verification of correct person, site, and procedure occurs any time the responsibility for care of the patient is transferred to another member of the care team.    The following medication was given:     MEDICATION:  Lidocaine without epinephrine 2% jelly  ROUTE: urethral   SITE: urethral   DOSE: 10 mL  LOT #: [id number]  : [employer]  EXPIRATION DATE: 1/23  NDC#: [address]-1   Was there drug waste? No    Prior to med admin, verified patient identity using patient's name and date of birth.  Due to med administration, patient instructed to remain in clinic for 15 minutes  afterwards, and to report any adverse reaction to me immediately.    Drug Amount Wasted:  None.  Vial/Syringe: MARY ALICE Silva  7/14/2021  11:09 AM
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
## 2021-07-14 NOTE — PATIENT INSTRUCTIONS
"-add oxybutynin  -continue myrbetriq  -resume estrogen cream  -resume PT when able.  -f/u in 6 months.  AFTER YOUR CYSTOSCOPY        You have just completed a cystoscopy, or \"cysto\", which allowed your physician to learn more about your bladder (or to remove a stent placed after surgery). We suggest that you continue to avoid caffeine, fruit juice, and alcohol for the next 24 hours, however, you are encouraged to return to your normal activities.         A few things that are considered normal after your cystoscopy:     * Small amount of bleeding (or spotting) that clears within the next 24 hours     * Slight burning sensation with urination     * Sensation to of needing to avoid more frequently     * The feeling of \"air\" in your urine     * Mild discomfort that is relieved with Tylenol        Please contact our office promptly if you:     * Develop a fever above 101 degrees     * Are unable to urinate     * Develop bright red blood that does not stop     * Severe pain or swelling         Please contact our office with any concerns or questions @[email or web address].  "

## 2021-07-19 LAB
ALBUMIN UR-MCNC: NEGATIVE MG/DL
APPEARANCE UR: CLEAR
BILIRUB UR QL STRIP: NEGATIVE
COLOR UR AUTO: YELLOW
GLUCOSE UR STRIP-MCNC: NEGATIVE MG/DL
HGB UR QL STRIP: NEGATIVE
KETONES UR STRIP-MCNC: NEGATIVE MG/DL
LEUKOCYTE ESTERASE UR QL STRIP: NEGATIVE
NITRATE UR QL: NEGATIVE
PH UR STRIP: 6 [PH] (ref 5–8)
SP GR UR STRIP: 1.02 (ref 1–1.03)
UROBILINOGEN UR STRIP-ACNC: 0.2 E.U./DL

## 2021-08-10 ENCOUNTER — TELEPHONE (OUTPATIENT)
Dept: OTHER | Facility: CLINIC | Age: 76
End: 2021-08-10

## 2021-08-10 ENCOUNTER — OFFICE VISIT (OUTPATIENT)
Dept: OTHER | Facility: CLINIC | Age: 76
End: 2021-08-10
Attending: INTERNAL MEDICINE
Payer: COMMERCIAL

## 2021-08-10 VITALS
HEIGHT: 67 IN | WEIGHT: 261 LBS | HEART RATE: 58 BPM | SYSTOLIC BLOOD PRESSURE: 120 MMHG | DIASTOLIC BLOOD PRESSURE: 68 MMHG | BODY MASS INDEX: 40.97 KG/M2 | RESPIRATION RATE: 16 BRPM | OXYGEN SATURATION: 96 %

## 2021-08-10 DIAGNOSIS — I73.9 PAD (PERIPHERAL ARTERY DISEASE) (H): Primary | ICD-10-CM

## 2021-08-10 DIAGNOSIS — R20.2 PARESTHESIA: ICD-10-CM
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
## 2021-08-10 DIAGNOSIS — R60.0 LOWER EXTREMITY EDEMA: ICD-10-CM

## 2021-08-10 DIAGNOSIS — S81.801D OPEN WOUND OF RIGHT LOWER EXTREMITY, SUBSEQUENT ENCOUNTER: ICD-10-CM

## 2021-08-10 PROCEDURE — 99204 OFFICE O/P NEW MOD 45 MIN: CPT | Performed by: INTERNAL MEDICINE

## 2021-08-10 PROCEDURE — G0463 HOSPITAL OUTPT CLINIC VISIT: HCPCS

## 2021-08-10 ASSESSMENT — MIFFLIN-ST. JEOR: SCORE: 1711.52

## 2021-08-10 NOTE — PROGRESS NOTES
[facility]        Patient is here for a consult to discuss Lymphedema. The patient states he/she does not wear compressions. Swelling is observed in both lower extremities.

## 2021-08-10 NOTE — TELEPHONE ENCOUNTER
Follow up to:  08/10/21      Please arrange for:      BLE Venous competency US - @ [facility] if possible    MARI with exercise and toe pressures - at [facility]    In clinic visit with Dr. Duque following the MARI (same day please).      Silvana Leonardo RN BSN  [facility]  [phone]
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
## 2021-08-11 NOTE — TELEPHONE ENCOUNTER
Patient scheduled in [place] (Patient preferences)     9/9/2021 Venous comp  9/16/2021 MARI and Appt with Dr. Dunia PABLO

## 2021-09-04 ENCOUNTER — HEALTH MAINTENANCE LETTER (OUTPATIENT)
Age: 76
End: 2021-09-04

## 2021-09-09 ENCOUNTER — ANCILLARY PROCEDURE (OUTPATIENT)
Dept: ULTRASOUND IMAGING | Facility: CLINIC | Age: 76
End: 2021-09-09
Attending: INTERNAL MEDICINE
Payer: COMMERCIAL

## 2021-09-09 DIAGNOSIS — R20.2 PARESTHESIA: ICD-10-CM

## 2021-09-09 DIAGNOSIS — R60.0 LOWER EXTREMITY EDEMA: ICD-10-CM

## 2021-09-09 PROCEDURE — 93970 EXTREMITY STUDY: CPT | Performed by: SURGERY

## 2021-09-16 ENCOUNTER — OFFICE VISIT (OUTPATIENT)
Dept: OTHER | Facility: CLINIC | Age: 76
End: 2021-09-16
Attending: INTERNAL MEDICINE
Payer: COMMERCIAL

## 2021-09-16 ENCOUNTER — HOSPITAL ENCOUNTER (OUTPATIENT)
Dept: ULTRASOUND IMAGING | Facility: CLINIC | Age: 76
End: 2021-09-16
Attending: INTERNAL MEDICINE
Payer: COMMERCIAL

## 2021-09-16 VITALS
HEART RATE: 93 BPM | RESPIRATION RATE: 18 BRPM | OXYGEN SATURATION: 99 % | HEIGHT: 67 IN | SYSTOLIC BLOOD PRESSURE: 144 MMHG | DIASTOLIC BLOOD PRESSURE: 90 MMHG | BODY MASS INDEX: 41.44 KG/M2 | WEIGHT: 264 LBS

## 2021-09-16 DIAGNOSIS — S81.801D OPEN WOUND OF RIGHT LOWER EXTREMITY, SUBSEQUENT ENCOUNTER: ICD-10-CM

## 2021-09-16 DIAGNOSIS — I73.9 PAD (PERIPHERAL ARTERY DISEASE) (H): ICD-10-CM

## 2021-09-16 DIAGNOSIS — I10 ESSENTIAL HYPERTENSION: ICD-10-CM

## 2021-09-16 DIAGNOSIS — R60.0 LOWER EXTREMITY EDEMA: ICD-10-CM

## 2021-09-16 DIAGNOSIS — E78.5 HYPERLIPIDEMIA LDL GOAL <130: Primary | ICD-10-CM

## 2021-09-16 DIAGNOSIS — R20.2 PARESTHESIA: ICD-10-CM
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
## 2021-09-16 PROCEDURE — 99214 OFFICE O/P EST MOD 30 MIN: CPT | Performed by: INTERNAL MEDICINE

## 2021-09-16 PROCEDURE — 93924 LWR XTR VASC STDY BILAT: CPT

## 2021-09-16 PROCEDURE — G0463 HOSPITAL OUTPT CLINIC VISIT: HCPCS

## 2021-09-16 RX ORDER — LANOLIN ALCOHOL/MO/W.PET/CERES
4 CREAM (GRAM) TOPICAL
COMMUNITY
End: 2022-10-27

## 2021-09-16 RX ORDER — ROSUVASTATIN CALCIUM 20 MG/1
20 TABLET, COATED ORAL DAILY
Qty: 30 TABLET | Refills: 3 | Status: SHIPPED | OUTPATIENT
Start: 2021-09-16 | End: 2022-03-10

## 2021-09-16 RX ORDER — LOSARTAN POTASSIUM 25 MG/1
25 TABLET ORAL DAILY
Qty: 30 TABLET | Refills: 3 | Status: SHIPPED | OUTPATIENT
Start: 2021-09-16 | End: 2022-01-28

## 2021-09-16 ASSESSMENT — MIFFLIN-ST. JEOR: SCORE: 1720.13
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
## 2021-09-16 NOTE — PROGRESS NOTES
"[facility]        Patient is here for a follow up  to discuss about BLE Venous Comp results    /89 (BP Location: Other (Comment), Patient Position: Chair, Cuff Size: Adult Regular)   Pulse 93   Resp 18   Ht 5' 7\" (1.702 m)   Wt 264 lb (119.7 kg)   SpO2 99%   BMI 41.35 kg/m      The provider has been notified that the patient has no concerns.     Questions patient would like addressed today are: N/A.    Refills are needed: No    Has homecare services and agency name:  [facility]      "

## 2021-10-30 ENCOUNTER — HEALTH MAINTENANCE LETTER (OUTPATIENT)
Age: 76
End: 2021-10-30

## 2021-12-17 ENCOUNTER — TELEPHONE (OUTPATIENT)
Dept: OTHER | Facility: CLINIC | Age: 76
End: 2021-12-17
Payer: COMMERCIAL

## 2021-12-17 NOTE — TELEPHONE ENCOUNTER
TISHM for patient for patient to call back and scheduled appt with Dr. Duque- she received a letter in the mail.

## 2021-12-22 ENCOUNTER — PRE VISIT (OUTPATIENT)
Dept: UROLOGY | Facility: CLINIC | Age: 76
End: 2021-12-22
Payer: COMMERCIAL

## 2021-12-22 NOTE — TELEPHONE ENCOUNTER
Reason for Visit: Follow-up    Diagnosis: Mixed stress and urge urinary incontinence, Urge incontinence, Stress incontinence, Atrophic vaginitis    Orders/Procedures/Records: in system    Contact Patient: n/a    Rooming Requirements: Normal      [facility]vin  12/22/21  12:47 PM

## 2021-12-29 ENCOUNTER — OFFICE VISIT (OUTPATIENT)
Dept: OTHER | Facility: CLINIC | Age: 76
End: 2021-12-29
Attending: INTERNAL MEDICINE
Payer: COMMERCIAL

## 2021-12-29 VITALS
HEART RATE: 105 BPM | SYSTOLIC BLOOD PRESSURE: 134 MMHG | HEIGHT: 67 IN | BODY MASS INDEX: 42.22 KG/M2 | WEIGHT: 269 LBS | DIASTOLIC BLOOD PRESSURE: 82 MMHG | RESPIRATION RATE: 18 BRPM | OXYGEN SATURATION: 98 %

## 2021-12-29 DIAGNOSIS — R20.2 PARESTHESIA: ICD-10-CM

## 2021-12-29 DIAGNOSIS — R60.0 LOWER EXTREMITY EDEMA: ICD-10-CM

## 2021-12-29 DIAGNOSIS — S81.801D OPEN WOUND OF RIGHT LOWER EXTREMITY, SUBSEQUENT ENCOUNTER: ICD-10-CM
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
## 2021-12-29 DIAGNOSIS — E78.5 HYPERLIPIDEMIA LDL GOAL <130: ICD-10-CM

## 2021-12-29 DIAGNOSIS — I10 ESSENTIAL HYPERTENSION: ICD-10-CM

## 2021-12-29 PROCEDURE — 99214 OFFICE O/P EST MOD 30 MIN: CPT | Performed by: INTERNAL MEDICINE

## 2021-12-29 PROCEDURE — G0463 HOSPITAL OUTPT CLINIC VISIT: HCPCS

## 2021-12-29 ASSESSMENT — MIFFLIN-ST. JEOR: SCORE: 1742.81

## 2021-12-29 NOTE — PROGRESS NOTES
[facility] VASCULAR [facility]  VASCULAR MEDICINE FOLLOW-UP VISIT      PRIMARY HEALTH CARE PROVIDER:  Marie Robledo    REASON FOR VISIT: Follow-up lesions insufficiency      HPI: Hiro Carranza is a very pleasant 76 year old follow-up on venous insufficiency, patient wearing her compression stockings, patient is completely asymptomatic with some residual swelling especially at the upper leg, nothing around the ankle, no spontaneous bleeding no spontaneous ulceration, no restless leg symptoms      PAST MEDICAL HISTORY  Past Medical History:   Diagnosis Date     Arthritis      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    cards EKG overread- benign unless pt develops palpitations       PAST SURGICAL HISTORY:  Past Surgical History:   Procedure Laterality Date     ARTHROPLASTY KNEE Right 6/10/2015    Procedure: ARTHROPLASTY KNEE;  Surgeon: Jorge Luis Snyder MD;  Location: [facility]     ESOPHAGOSCOPY, GASTROSCOPY, DUODENOSCOPY (EGD), COMBINED N/A 5/21/2015    Procedure: COMBINED ESOPHAGOSCOPY, GASTROSCOPY, DUODENOSCOPY (EGD), BIOPSY SINGLE OR MULTIPLE;  Surgeon: Venkatesh Aguilera MD;  Location:  GI     ESOPHAGOSCOPY, GASTROSCOPY, DUODENOSCOPY (EGD), COMBINED N/A 6/21/2018    Procedure: COMBINED ESOPHAGOSCOPY, GASTROSCOPY, DUODENOSCOPY (EGD), BIOPSY SINGLE OR MULTIPLE;  gastroscopy;  Surgeon: Venkatesh Aguilera MD;  Location:  GI     LAPAROSCOPIC CHOLECYSTECTOMY N/A 8/4/2016    Procedure: LAPAROSCOPIC CHOLECYSTECTOMY;  Surgeon: Venkatesh Ford MD;  Location:  OR     SURGICAL HISTORY OF -   2008    R  cataract     [facility] NONSPECIFIC PROCEDURE  2004    ovarian cyst drainage, resection of fibroid tumors     [facility] NONSPECIFIC PROCEDURE  2005    L cataract     [facility] NONSPECIFIC PROCEDURE  6/08    L knee replacement     [facility] RAD RESEC TONSIL/PILLARS  1953     [facility] SHOULDER SURG PROC UNLISTED  2009    lt shoulder arthritis- replacement     [facility] TOTAL KNEE ARTHROPLASTY      left knee total 08         CURRENT MEDICATIONS  cephALEXin (KEFLEX) 500 MG capsule, TAKE 1 CAPSULE BY MOUTH FOUR TIMES DAILY UNTIL GONE.  Cholecalciferol (VITAMIN D3 PO), Take by mouth daily  clotrimazole (LOTRIMIN) 1 % external cream, APPLY TOPICALLY 2 TIMES DAILY as needed  COMPOUNDED NON-CONTROLLED SUBSTANCE (CMPD RX) - PHARMACY TO MIX COMPOUNDED MEDICATION, Estriol 1 mg/g Apply small amount to finger and apply to inside vagina daily for 2 weeks then twice weekly Route: vaginally  DAILY MULTIVITAMIN PO, DAILY  FISH  MG OR CAPS, 2 tablets daily  GLUCOSAMINE-CHONDROITIN PO,   hydrochlorothiazide (HYDRODIURIL) 25 MG tablet, TAKE ONE TABLET BY MOUTH ONCE DAILY   Ibuprofen (ADVIL PO),   losartan (COZAAR) 25 MG tablet, Take 1 tablet (25 mg) by mouth daily  melatonin 3 MG tablet, Take 4 mg by mouth nightly as needed for sleep  mirabegron (MYRBETRIQ) 25 MG 24 hr tablet, Take 1 tablet (25 mg) by mouth daily  omeprazole (PRILOSEC) 20 MG DR capsule, TAKE ONE CAPSULE BY MOUTH TWICE DAILY   polyethylene glycol 400 (BLINK TEARS) 0.25 % SOLN ophthalmic solution, 1 drop  rosuvastatin (CRESTOR) 20 MG tablet, Take 1 tablet (20 mg) by mouth daily  simvastatin (ZOCOR) 20 MG tablet, TAKE ONE TABLET BY MOUTH AT BEDTIME  tolterodine ER (DETROL LA) 4 MG 24 hr capsule, Take 1 capsule (4 mg) by mouth daily  valACYclovir (VALTREX) 500 MG tablet, TAKE ONE TABLET BY MOUTH TWICE DAILY for 3 days    No current facility-administered medications on file prior to visit.      ALLERGIES     Allergies   Allergen Reactions     No Known Drug Allergies        FAMILY HISTORY  Family History    Problem Relation Age of Onset     PAT Father         triple bypass in his late 60's     Circulatory Father      Lipids Father      Obesity Father      C.A.TIANNA. Maternal Grandfather         death from MI in early 60s     Obesity Maternal Grandfather      C.A.TIANNA. Paternal Grandfather         MI in early 70s     Obesity Paternal Grandfather      Hypertension Mother      Arthritis Mother      Lipids Mother      Musculoskeletal Disorder Mother         double knee replacement     Obesity Mother      Cerebrovascular Disease Maternal Grandmother      Obesity Maternal Grandmother      Obesity Paternal Grandmother        SOCIAL HISTORY  Social History     Socioeconomic History     Marital status:      Spouse name: Gene     Number of children: 0     Years of education: 17     Highest education level: Not on file   Occupational History     Employer: [employer]     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 1-2 glasses wime monthly     Drug use: No     Sexual activity: Yes     Partners: Male   Other Topics Concern     Parent/sibling w/ CABG, MI or angioplasty before 65F 55M? No      Service No     Blood Transfusions No     Caffeine Concern No     Occupational Exposure No     Hobby Hazards No     Sleep Concern No     Stress Concern No     Weight Concern Yes     Special Diet No     Back Care No     Exercise No     Bike Helmet Not Asked     Comment: NA     Seat Belt Yes     Self-Exams Yes   Social History Narrative          Social Documentation:        Balanced Diet: NO- could be better- work on lean meats and fruits/vegies    Calcium intake: 1-2 and  "supplement per day    Caffeine: 2-6 per day    Exercise:  type of activity   0 times per week    Sunscreen: Yes    Seatbelts:  Yes    Self Breast Exam:  No    Self Testicular Exam: No - na    Physical/Emotional/Sexual Abuse: No     Do you feel safe in your environment? Yes        Cholesterol screen up to date: Yes - much improved on simvastatin    Lab Test   7/15/09   6/5/09                  0906      1020         CHOL       178       269*         HDL        63        67           LDL        100       186*         TRIG       74        82           CHOLHDLRA* 2.8       4.0           Eye Exam up to date: Yes    Dental Exam up to date: Yes    Pap smear up to date: Yes, '08                                                                                                          Mammogram up to date: Yes (12/09)    Dexa Scan up to date: Yes- 2006 (mild osteopenia)    Colonoscopy up to date: Yes- 2008    Immunizations up to date: Yes    Glucose screen if over 40:  Yes, do today     Social Determinants of Health     Financial Resource Strain: Not on file   Food Insecurity: Not on file   Transportation Needs: Not on file   Physical Activity: Not on file   Stress: Not on file   Social Connections: Not on file   Intimate Partner Violence: Not on file   Housing Stability: Not on file       ROS:   Complete ROS negative other than what is stated in HPI.     EXAM:  /82 (BP Location: Other (Comment), Patient Position: Chair, Cuff Size: Adult Regular)   Pulse 105   Resp 18   Ht 1.702 m (5' 7\")   Wt 122 kg (269 lb)   SpO2 98%   BMI 42.13 kg/m    In general, the patient is a pleasant female in no apparent distress.    HEENT: NC/AT.  PERRLA.  EOMI.  Sclerae white, not injected.    Neck: No adenopathy.  Carotids +2/2 bilaterally without bruits.   Heart: RRR. Normal S1, S2 splits physiologically. No murmur, rub, click, or gallop.   Lungs: CTA.  No ronchi, wheezes, rales.    Abdomen: Soft, nontender, nondistended. "   Extremities: No clubbing, cyanosis, or edema.  No wounds.     Labs:  LIPID RESULTS:  Lab Results   Component Value Date    CHOL 168 07/01/2020    HDL 59 07/01/2020    LDL 90 07/01/2020    TRIG 97 07/01/2020    CHOLHDLRATIO 2.6 12/03/2014       LIVER ENZYME RESULTS:  Lab Results   Component Value Date    AST 20 05/28/2021    ALT 17 05/28/2021       CBC RESULTS:  Lab Results   Component Value Date    WBC 9.0 05/28/2021    RBC 4.13 05/28/2021    HGB 12.3 05/28/2021    HCT 38.6 05/28/2021    MCV 94 05/28/2021    MCH 29.8 05/28/2021    MCHC 31.9 05/28/2021    RDW 14.5 05/28/2021     05/28/2021       BMP RESULTS:  Lab Results   Component Value Date     05/28/2021    POTASSIUM 3.5 05/28/2021    CHLORIDE 100 05/28/2021    CO2 30 05/28/2021    ANIONGAP 8 05/28/2021    GLC 78 05/28/2021    BUN 15 05/28/2021    CR 0.68 05/28/2021    GFRESTIMATED 85 05/28/2021    GFRESTBLACK >90 05/28/2021    ELENA 9.4 05/28/2021        A1C RESULTS:  No results found for: A1C    THYROID RESULTS:  Lab Results   Component Value Date    TSH 1.13 03/01/2017             Assessment and Plan:  1-venous insufficiency, asymptomatic, patient is wearing compression stockings, continue to wear compression stockings  2-leg elevation  3-follow-up in 6 months as needed        Ethan Duque MD       25 minutes spent on the date of the encounter doing chart review, history and exam, documentation, and further activities as noted above.

## 2021-12-29 NOTE — PROGRESS NOTES
"[facility]        Patient is here for a follow up  to discuss about hyperlipidemia      BP (!) 173/91 (BP Location: Other (Comment), Patient Position: Chair, Cuff Size: Adult Regular)   Pulse 105   Resp 18   Ht 5' 7\" (1.702 m)   Wt 269 lb (122 kg)   SpO2 98%   BMI 42.13 kg/m      The provider has been notified that the patient has no concerns.     Questions patient would like addressed today are: N/A.    Refills are needed: No    Has homecare services and agency name:  [facility]      "

## 2022-01-03 DIAGNOSIS — B37.2 INTERTRIGINOUS CANDIDIASIS: ICD-10-CM

## 2022-01-03 DIAGNOSIS — A60.04 HERPES SIMPLEX VULVOVAGINITIS: ICD-10-CM

## 2022-01-04 RX ORDER — VALACYCLOVIR HYDROCHLORIDE 500 MG/1
TABLET, FILM COATED ORAL
Qty: 36 TABLET | Refills: 0 | Status: SHIPPED | OUTPATIENT
Start: 2022-01-04 | End: 2022-04-04

## 2022-01-04 NOTE — TELEPHONE ENCOUNTER
CW,    Clotrimazole:    Routing refill request to provider for review/approval because:  Drug not on the [facility] refill protocol   Last OV 5/28/21 - open wound RLE.    Last physical 9/8/2020.  Thanks,  Aarti Black RN

## 2022-01-05 ENCOUNTER — VIRTUAL VISIT (OUTPATIENT)
Dept: UROLOGY | Facility: CLINIC | Age: 77
End: 2022-01-05
Payer: COMMERCIAL

## 2022-01-05 DIAGNOSIS — N39.3 STRESS INCONTINENCE: ICD-10-CM

## 2022-01-05 DIAGNOSIS — R39.11 URINARY HESITANCY: ICD-10-CM

## 2022-01-05 DIAGNOSIS — N39.41 URGE INCONTINENCE: Primary | ICD-10-CM

## 2022-01-05 DIAGNOSIS — R39.15 URINARY URGENCY: ICD-10-CM

## 2022-01-05 DIAGNOSIS — N95.2 ATROPHIC VAGINITIS: ICD-10-CM

## 2022-01-05 PROCEDURE — 99215 OFFICE O/P EST HI 40 MIN: CPT | Mod: 95 | Performed by: UROLOGY

## 2022-01-05 RX ORDER — CLOTRIMAZOLE 1 %
CREAM (GRAM) TOPICAL
Qty: 60 G | Refills: 0 | Status: SHIPPED | OUTPATIENT
Start: 2022-01-05 | End: 2022-07-26

## 2022-01-05 NOTE — PROGRESS NOTES
Hiro Bloom is a 76 year old who is being evaluated via a billable video visit.      How would you like to obtain your AVS? Mail a copy  If the video visit is dropped, the invitation should be resent by: Text to cell phone: [phone]  Will anyone else be joining your video visit? No      Video Start Time: 9:48 AM  Video-Visit Details    Type of service:  Video Visit    Video End Time:10:03 AM    Originating Location (pt. Location): Home    Distant Location (provider location):  [facility]     Platform used for Video Visit: ShowMe
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
## 2022-01-05 NOTE — PATIENT INSTRUCTIONS
-stop detrol 4 mg for now  -continue myrbetriq  -resume estrogen cream  -resume PT    -start PTNS in [place]  -f/u in 6 months.
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
## 2022-01-06 ENCOUNTER — MYC MEDICAL ADVICE (OUTPATIENT)
Dept: UROLOGY | Facility: CLINIC | Age: 77
End: 2022-01-06
Payer: COMMERCIAL

## 2022-01-06 ENCOUNTER — TELEPHONE (OUTPATIENT)
Dept: UROLOGY | Facility: CLINIC | Age: 77
End: 2022-01-06
Payer: COMMERCIAL

## 2022-01-06 NOTE — TELEPHONE ENCOUNTER
Financial Counselor Review:    Procedure to be performed (include CPT code):Yes, 75245  PTNS (percutaneous tibial nerve stimulation) completed during a nurse visit weekly for 12 weeks then every 4 weeks after for maintenance    Diagnosis code (include ICD-10 code): Urinary urgency R39.15 and urge incontinence N39.41    Coverage and patient financial responsibility information:YES    Please send update to  adult urology pool.    Renetta Barakat RN, BSN

## 2022-01-10 NOTE — TELEPHONE ENCOUNTER
PB DOS: TBD  Type of Procedure: PTNS (percutaneous tibial nerve stimulation)  CPT Codes: 03521  ICD10 Codes: Urinary urgency R39.15 and urge incontinence N39.41  Surgeon/Ordering provider: Dr. Mcgarry  Pre-cert/Authorization completed:  No PA Required  Payer: [employer] medicare  Spoke to [facility] PA list and medicare CMS  Ref. # / Auth #   Valid Dates:     Please advise patient to contact their insurance for coverage and benefits.

## 2022-01-10 NOTE — TELEPHONE ENCOUNTER
Called and spoke to patient who is aware of note below from financial counseling team. Cpt codes provided to patient who will contact her insurance company to discuss coverage. Informed patient to return call to clinic if and when she is ready to schedule the first nurse visit for PTNS. Patient verbalized understanding and was comfortable with plan.    Renetta Barakat RN, BSN

## 2022-01-11 NOTE — CONFIDENTIAL NOTE
"Called and spoke to patient who reports that she discussed the coverage with her insurance. Patient stated, \"It could be covered under outpatient hospital care services.\" Informed patient that we are considered an outpatient facility but we are not a hospital. Patient stated, \"The  did a lengthy review.\" Patient requested to schedule nurse visit for PTNS. Nurse only appointment scheduled for 1/18/22 at 10:30am. Patient aware of clinic address and to check in on the 2nd floor at desk D.    Renetta Barakat RN, BSN    "

## 2022-01-11 NOTE — CONFIDENTIAL NOTE
Returned call to patient who was unavailable at this time. Family member who answered phone requested for writer to return call in about 15 minutes.    Renetta Barakat RN, BSN

## 2022-01-18 ENCOUNTER — ALLIED HEALTH/NURSE VISIT (OUTPATIENT)
Dept: NURSING | Facility: CLINIC | Age: 77
End: 2022-01-18
Payer: COMMERCIAL

## 2022-01-18 DIAGNOSIS — R39.15 URINARY URGENCY: Primary | ICD-10-CM

## 2022-01-18 DIAGNOSIS — N39.41 URGE INCONTINENCE OF URINE: ICD-10-CM

## 2022-01-18 PROCEDURE — 64566 NEUROELTRD STIM POST TIBIAL: CPT | Performed by: UROLOGY

## 2022-01-18 NOTE — NURSING NOTE
EileenMerle Carranza comes into clinic today at the request of Dr. Mcgarry Ordering Provider for PTNS for diagnosis of urinary urgency and urge incontinence.    PTNS Treatment    Session: 1  Patient Goals and Progress:    Decreased Urgency: Yes   Decreased Frequency: Yes   No accidents: Yes   Sleep through night: Yes   No leaks during intercourse: NOT APPLICABLE     Related Health and Social Factors:  Yes   Caffeine: two 12oz cups of coffee Per Day  Alcohol: 1 glass of wine or 1 beer Per week  Daytime Voids: 8 Times Per Day  Nighttime Voids: 2-3 Times Per Night  Urgency:  (0= none, 4= severe)  4  Incontinence: Episodes  3 Per Day    Ankles Used: left  Treatment Settin  Feeling/Response:   Toe Flex: Yes   Foot Sensation: Yes    This service provided today was under the supervising provider of the day Dr. Cooley, who was available if needed.    Renetta Barakat RN

## 2022-01-25 ENCOUNTER — ALLIED HEALTH/NURSE VISIT (OUTPATIENT)
Dept: NURSING | Facility: CLINIC | Age: 77
End: 2022-01-25
Payer: COMMERCIAL

## 2022-01-25 DIAGNOSIS — N39.41 URGE INCONTINENCE OF URINE: ICD-10-CM

## 2022-01-25 DIAGNOSIS — R39.15 URINARY URGENCY: Primary | ICD-10-CM

## 2022-01-25 PROCEDURE — 64566 NEUROELTRD STIM POST TIBIAL: CPT | Performed by: UROLOGY

## 2022-01-25 NOTE — NURSING NOTE
Hiro Carranza comes into clinic today at the request of Dr. Mcgarry Ordering Provider for PTNS for diagnosis of urinary urgency and urge incontinence.    PTNS Treatment    Session: 2  Ankles Used: left  Treatment Settin  Feeling/Response: yes  Toe Flex: yes  Foot Sensation: yes    This service provided today was under the supervising provider of the day Dr. Cooley, who was available if needed.    Renetta Barakat RN

## 2022-01-28 DIAGNOSIS — I10 ESSENTIAL HYPERTENSION: ICD-10-CM

## 2022-01-28 RX ORDER — LOSARTAN POTASSIUM 25 MG/1
25 TABLET ORAL DAILY
Qty: 90 TABLET | Refills: 1 | Status: SHIPPED | OUTPATIENT
Start: 2022-01-28 | End: 2022-07-25

## 2022-01-28 NOTE — TELEPHONE ENCOUNTER
"losartan (COZAAR) 25 MG tablet  Last Written Prescription Date:  9/16/21  Last Fill Quantity: 30,  # refills: 3    Last office visit: 12/29/21  Follow up due: 6 month    Requested Prescriptions   Pending Prescriptions Disp Refills     losartan (COZAAR) 25 MG tablet 90 tablet 1     Sig: Take 1 tablet (25 mg) by mouth daily       Angiotensin-II Receptors Passed - 1/28/2022 11:19 AM        Passed - Last blood pressure under 140/90 in past 12 months     BP Readings from Last 3 Encounters:   12/29/21 134/82   09/16/21 (!) 144/90   08/10/21 120/68                 Passed - Recent (12 mo) or future (30 days) visit within the authorizing provider's specialty     Patient has had an office visit with the authorizing provider or a provider within the authorizing providers department within the previous 12 mos or has a future within next 30 days. See \"Patient Info\" tab in inbasket, or \"Choose Columns\" in Meds & Orders section of the refill encounter.              Passed - Medication is active on med list        Passed - Patient is age 18 or older        Passed - No active pregnancy on record        Passed - Normal serum creatinine on file in past 12 months     Recent Labs   Lab Test 05/28/21  1057   CR 0.68       Ok to refill medication if creatinine is low          Passed - Normal serum potassium on file in past 12 months     Recent Labs   Lab Test 05/28/21  1057   POTASSIUM 3.5                    Passed - No positive pregnancy test in past 12 months           Prescription approved per [facility] Refill Protocol.  Silvana Leonardo RN BSN  [facility]  [phone]                    .  "

## 2022-02-01 ENCOUNTER — ALLIED HEALTH/NURSE VISIT (OUTPATIENT)
Dept: NURSING | Facility: CLINIC | Age: 77
End: 2022-02-01
Payer: COMMERCIAL

## 2022-02-01 DIAGNOSIS — R39.15 URINARY URGENCY: Primary | ICD-10-CM

## 2022-02-01 DIAGNOSIS — N39.46 MIXED STRESS AND URGE URINARY INCONTINENCE: ICD-10-CM

## 2022-02-01 DIAGNOSIS — N39.41 URGE INCONTINENCE OF URINE: ICD-10-CM

## 2022-02-01 PROCEDURE — 64566 NEUROELTRD STIM POST TIBIAL: CPT | Performed by: UROLOGY

## 2022-02-01 NOTE — NURSING NOTE
Hiro Carranza comes into clinic today at the request of Dr. Mcgarry Ordering Provider for PTNS for diagnosis of urinary urgency and urge incontinence.    PTNS Treatment    Session: 3  Ankles Used: left  Treatment Settin  Feeling/Response: yes  Toe Flex: no  Foot Sensation: yes      This service provided today was under the supervising provider of the day Dr. Cooley, who was available if needed.    Renetta Barakat RN

## 2022-02-04 RX ORDER — MIRABEGRON 25 MG/1
25 TABLET, FILM COATED, EXTENDED RELEASE ORAL DAILY
Qty: 30 TABLET | Refills: 11 | Status: SHIPPED | OUTPATIENT
Start: 2022-02-04 | End: 2023-02-13

## 2022-02-04 NOTE — TELEPHONE ENCOUNTER
Myrbetriq Oral Tablet Extended Release 24 Hour 25 MG  Last Written Prescription Date:  2/3/2021  Last Fill Quantity: 30,   # refills: 11  Last Office Visit :  1/5/2022  Future Office visit:  None  30 Tabs, 11 Refills sent to pharm 2/4/2022      Alessandra Kramer RN  Central Triage Red Flags/Med Refills

## 2022-02-08 ENCOUNTER — ALLIED HEALTH/NURSE VISIT (OUTPATIENT)
Dept: NURSING | Facility: CLINIC | Age: 77
End: 2022-02-08
Payer: COMMERCIAL

## 2022-02-08 DIAGNOSIS — R39.15 URINARY URGENCY: Primary | ICD-10-CM

## 2022-02-08 DIAGNOSIS — N39.41 URGE INCONTINENCE OF URINE: ICD-10-CM

## 2022-02-08 PROCEDURE — 64566 NEUROELTRD STIM POST TIBIAL: CPT | Performed by: UROLOGY

## 2022-02-08 NOTE — NURSING NOTE
Hiro Carranza comes into clinic today at the request of Dr. Mcgarry Ordering Provider for PTNS for diagnosis of urinary urgency and urge incontinence.    PTNS Treatment    Session: 4  Ankles Used: left  Treatment Settin  Feeling/Response: yes  Toe Flex: no  Foot Sensation: yes    This service provided today was under the supervising provider of the day Dr. Cooley, who was available if needed.    Renetta Barakat RN

## 2022-02-15 ENCOUNTER — ALLIED HEALTH/NURSE VISIT (OUTPATIENT)
Dept: NURSING | Facility: CLINIC | Age: 77
End: 2022-02-15
Payer: COMMERCIAL

## 2022-02-15 DIAGNOSIS — N39.41 URGE INCONTINENCE OF URINE: ICD-10-CM

## 2022-02-15 DIAGNOSIS — R39.15 URINARY URGENCY: Primary | ICD-10-CM

## 2022-02-15 PROCEDURE — 64566 NEUROELTRD STIM POST TIBIAL: CPT

## 2022-02-15 NOTE — NURSING NOTE
Hiro Carranza comes into clinic today at the request of Dr. Mcgarry Ordering Provider for PTNS for diagnosis of urinary urgency and urge incontinence.    PTNS Treatment    Session: 5  Ankles Used: left  Treatment Settin  Feeling/Response: yes  Toe Flex: yes  Foot Sensation: yes    This service provided today was under the supervising provider of the day Dr. Cooley, who was available if needed.    Renetta Barakat RN

## 2022-02-23 ENCOUNTER — ALLIED HEALTH/NURSE VISIT (OUTPATIENT)
Dept: NURSING | Facility: CLINIC | Age: 77
End: 2022-02-23
Payer: COMMERCIAL

## 2022-02-23 DIAGNOSIS — N39.41 URGE INCONTINENCE OF URINE: ICD-10-CM

## 2022-02-23 DIAGNOSIS — R39.15 URINARY URGENCY: Primary | ICD-10-CM

## 2022-02-23 PROCEDURE — 64566 NEUROELTRD STIM POST TIBIAL: CPT

## 2022-02-23 NOTE — NURSING NOTE
Hiro Carranza comes into clinic today at the request of Dr. Mcgarry Ordering Provider for PTNS for diagnosis of urinary urgency and urge incontinence.    PTNS Treatment    Session: 6  Ankles Used: left  Treatment Settin  Feeling/Response: yes  Toe Flex: yes  Foot Sensation: yes    This service provided today was under the supervising provider of the day Dr. Reis, who was available if needed.    Renetta Barakat RN

## 2022-03-01 ENCOUNTER — ALLIED HEALTH/NURSE VISIT (OUTPATIENT)
Dept: NURSING | Facility: CLINIC | Age: 77
End: 2022-03-01
Payer: COMMERCIAL

## 2022-03-01 DIAGNOSIS — R39.15 URINARY URGENCY: Primary | ICD-10-CM

## 2022-03-01 DIAGNOSIS — N39.41 URGE INCONTINENCE OF URINE: ICD-10-CM

## 2022-03-01 PROCEDURE — 64566 NEUROELTRD STIM POST TIBIAL: CPT | Performed by: UROLOGY

## 2022-03-01 NOTE — NURSING NOTE
Hiro Carranza comes into clinic today at the request of Dr. Mcgarry Ordering Provider for PTNS for diagnosis of urinary urgency and urge incontinence.    PTNS Treatment    Session: 7  Ankles Used: left  Treatment Settin  Feeling/Response: yes  Toe Flex: yes  Foot Sensation: yes      This service provided today was under the supervising provider of the day Dr. Cooley, who was available if needed.    Renetta Barakat RN

## 2022-03-08 ENCOUNTER — ALLIED HEALTH/NURSE VISIT (OUTPATIENT)
Dept: NURSING | Facility: CLINIC | Age: 77
End: 2022-03-08
Payer: COMMERCIAL

## 2022-03-08 DIAGNOSIS — N39.41 URGE INCONTINENCE OF URINE: ICD-10-CM

## 2022-03-08 DIAGNOSIS — R39.15 URINARY URGENCY: Primary | ICD-10-CM

## 2022-03-08 PROCEDURE — 64566 NEUROELTRD STIM POST TIBIAL: CPT | Performed by: UROLOGY

## 2022-03-08 NOTE — NURSING NOTE
Hiro Carranza comes into clinic today at the request of Dr. Mcgarry Ordering Provider for PTNS for diagnosis of urinary urgency and urge incontinence.     PTNS Treatment    Session: 8  Ankles Used: left  Treatment Settin  Feeling/Response: yes  Toe Flex: yes  Foot Sensation: yes      This service provided today was under the supervising provider of the day Dr. Cooley, who was available if needed.    Renetta Barakat RN

## 2022-03-10 ENCOUNTER — TELEPHONE (OUTPATIENT)
Dept: OTHER | Facility: CLINIC | Age: 77
End: 2022-03-10
Payer: COMMERCIAL

## 2022-03-10 DIAGNOSIS — E78.5 HYPERLIPIDEMIA LDL GOAL <130: ICD-10-CM

## 2022-03-10 RX ORDER — ROSUVASTATIN CALCIUM 20 MG/1
20 TABLET, COATED ORAL DAILY
Qty: 30 TABLET | Refills: 9 | Status: SHIPPED | OUTPATIENT
Start: 2022-03-10 | End: 2022-12-16

## 2022-03-10 NOTE — TELEPHONE ENCOUNTER
Patient left a voicemail regarding her pharmacy in [place], ([facility]), being unable to refill her statin, and that she was running out.    Number she provided for a call back in voicemail:    [phone]    Thang Kim I   [address] [address] [phone]

## 2022-03-10 NOTE — TELEPHONE ENCOUNTER
Hi Jaelyn-       Your pregnancy test was negative.  Serina SKINNER I called Hiro Bloom and she states that she is out of refills for Crestor.  FRED from Dr. Duque to refill Crestor 20 mg- sent to patients pharmacy.     Alba PHILIPPE, RN    [facility]  Office: [phone]  Fax: [phone]

## 2022-03-15 ENCOUNTER — ALLIED HEALTH/NURSE VISIT (OUTPATIENT)
Dept: NURSING | Facility: CLINIC | Age: 77
End: 2022-03-15
Payer: COMMERCIAL

## 2022-03-15 DIAGNOSIS — R39.15 URINARY URGENCY: Primary | ICD-10-CM

## 2022-03-15 DIAGNOSIS — N39.41 URGE INCONTINENCE OF URINE: ICD-10-CM

## 2022-03-15 PROCEDURE — 64566 NEUROELTRD STIM POST TIBIAL: CPT

## 2022-03-15 NOTE — NURSING NOTE
Hiro Carranza comes into clinic today at the request of Dr. Mcgarry. Ordering Provider for PTNS for diagnosis of urinary urgency and urge incontinence.      PTNS Treatment    Session: 9  Ankles Used: left  Treatment Settin  Feeling/Response: yes  Toe Flex: yes  Foot Sensation: yes    This service provided today was under the supervising provider of the day Dr. Cooley, who was available if needed.    Shandra Ospina RN

## 2022-03-22 ENCOUNTER — ALLIED HEALTH/NURSE VISIT (OUTPATIENT)
Dept: NURSING | Facility: CLINIC | Age: 77
End: 2022-03-22
Payer: COMMERCIAL

## 2022-03-22 DIAGNOSIS — N39.41 URGE INCONTINENCE OF URINE: ICD-10-CM

## 2022-03-22 DIAGNOSIS — R39.15 URINARY URGENCY: Primary | ICD-10-CM

## 2022-03-22 PROCEDURE — 99207 PR NO CHARGE NURSE ONLY: CPT

## 2022-03-22 NOTE — NURSING NOTE
Hiro Carranza comes into clinic today at the request of Dr. Mcgarry Ordering Provider for PTNS for diagnosis of urinary urgency and urge incontinence.    PTNS Treatment    Session: 9  Ankles Used: left  Treatment Settin  Feeling/Response: yes  Toe Flex: yes  Foot Sensation: yes    This service provided today was under the supervising provider of the day Dr. Murillo, who was available if needed.    Renetta Barakat RN

## 2022-03-29 ENCOUNTER — ALLIED HEALTH/NURSE VISIT (OUTPATIENT)
Dept: NURSING | Facility: CLINIC | Age: 77
End: 2022-03-29
Payer: COMMERCIAL

## 2022-03-29 DIAGNOSIS — R39.15 URINARY URGENCY: Primary | ICD-10-CM

## 2022-03-29 DIAGNOSIS — N39.41 URGE INCONTINENCE OF URINE: ICD-10-CM

## 2022-03-29 PROCEDURE — 64566 NEUROELTRD STIM POST TIBIAL: CPT

## 2022-03-29 NOTE — NURSING NOTE
Hiro Carranza comes into clinic today at the request of Dr. Mcgarry Ordering Provider for PTNS for diagnosis of urinary urgency and urge incontinence.    PTNS Treatment    Session: 11  Ankles Used: left  Treatment Setting: 3  Feeling/Response: yes  Toe Flex: yes  Foot Sensation: yes      This service provided today was under the supervising provider of the day Dr. Cooley, who was available if needed.    Renetta Barakat RN

## 2022-04-05 ENCOUNTER — ALLIED HEALTH/NURSE VISIT (OUTPATIENT)
Dept: NURSING | Facility: CLINIC | Age: 77
End: 2022-04-05
Payer: COMMERCIAL

## 2022-04-05 DIAGNOSIS — R39.15 URINARY URGENCY: Primary | ICD-10-CM

## 2022-04-05 DIAGNOSIS — N39.41 URGE INCONTINENCE OF URINE: ICD-10-CM

## 2022-04-05 PROCEDURE — 64566 NEUROELTRD STIM POST TIBIAL: CPT

## 2022-04-05 NOTE — NURSING NOTE
Hiro Carranza comes into clinic today at the request of Dr. Mcgarry Ordering Provider for PTNS for diagnosis of urinary urgency and urge incontinence.    PTNS Treatment    Session: 12  Ankles Used: left  Treatment Settin  Feeling/Response: yes  Toe Flex: yes  Foot Sensation: yes    This service provided today was under the supervising provider of the day Dr. Cooley, who was available if needed.    Renetta Barakat RN

## 2022-05-03 ENCOUNTER — ALLIED HEALTH/NURSE VISIT (OUTPATIENT)
Dept: NURSING | Facility: CLINIC | Age: 77
End: 2022-05-03
Payer: COMMERCIAL

## 2022-05-03 DIAGNOSIS — N39.41 URGE INCONTINENCE OF URINE: ICD-10-CM

## 2022-05-03 DIAGNOSIS — R39.15 URINARY URGENCY: Primary | ICD-10-CM

## 2022-05-03 PROCEDURE — 64566 NEUROELTRD STIM POST TIBIAL: CPT

## 2022-05-03 NOTE — NURSING NOTE
Hiro Carranza comes into clinic today at the request of Dr. Mcgarry Ordering Provider for PTNS for diagnosis of urinary urgency and urge incontinence.    PTNS Treatment    Session: 4 week maintenance  Ankles Used: left  Treatment Settin  Feeling/Response: yes  Toe Flex: yes  Foot Sensation: yes      This service provided today was under the supervising provider of the day Dr. Cooley, who was available if needed.    Renetta Barakat RN

## 2022-05-31 ENCOUNTER — TELEPHONE (OUTPATIENT)
Dept: UROLOGY | Facility: CLINIC | Age: 77
End: 2022-05-31

## 2022-05-31 NOTE — TELEPHONE ENCOUNTER
Called and spoke to patient. Nurse visit scheduled for 6/1/22 at 9:30am.     Renetta Barakat RN, BSN

## 2022-05-31 NOTE — TELEPHONE ENCOUNTER
[facility] Call Center    Phone Message    May a detailed message be left on voicemail: yes     Reason for Call: Pt is requesting a call back to reschedule her nurse only appointment.  Thanks.    Action Taken: Message routed to:  Adult Clinics: Urology p [id number]    Travel Screening: Not Applicable

## 2022-06-01 ENCOUNTER — ALLIED HEALTH/NURSE VISIT (OUTPATIENT)
Dept: NURSING | Facility: CLINIC | Age: 77
End: 2022-06-01
Payer: COMMERCIAL

## 2022-06-01 DIAGNOSIS — N39.41 URGE INCONTINENCE OF URINE: ICD-10-CM

## 2022-06-01 DIAGNOSIS — R39.15 URINARY URGENCY: Primary | ICD-10-CM

## 2022-06-01 PROCEDURE — 64566 NEUROELTRD STIM POST TIBIAL: CPT

## 2022-06-01 NOTE — NURSING NOTE
Hiro Carranza comes into clinic today at the request of Dr. Mcgarry Ordering Provider for PTNS for diagnosis of urinary urgency and urge incontinence.    PTNS Treatment    Session: 4 week maintenance  Ankles Used: left  Treatment Setting: 3  Feeling/Response: yes  Toe Flex: yes  Foot Sensation: yes    This service provided today was under the supervising provider of the day Dr. Reis, who was available if needed.    Renetta Barakat RN

## 2022-06-27 ENCOUNTER — ALLIED HEALTH/NURSE VISIT (OUTPATIENT)
Dept: NURSING | Facility: CLINIC | Age: 77
End: 2022-06-27
Payer: COMMERCIAL

## 2022-06-27 DIAGNOSIS — R39.15 URINARY URGENCY: Primary | ICD-10-CM

## 2022-06-27 DIAGNOSIS — N39.41 URGE INCONTINENCE OF URINE: ICD-10-CM

## 2022-06-27 PROCEDURE — 64566 NEUROELTRD STIM POST TIBIAL: CPT

## 2022-06-27 NOTE — NURSING NOTE
Eileen Tere comes into clinic today at the request of Dr. Mcgarry Ordering Provider for PTNS for diagnosis of urinary urgency and urge incontinence.    PTNS Treatment    Session: 4 week maintenance  Ankles Used: left  Treatment Settin  Feeling/Response: yes  Toe Flex: yes  Foot Sensation: yes    This service provided today was under the supervising provider of the day Dr. Mcgarry, who was available if needed.    Renetta Barakta RN

## 2022-07-11 ENCOUNTER — VIRTUAL VISIT (OUTPATIENT)
Dept: UROLOGY | Facility: CLINIC | Age: 77
End: 2022-07-11
Payer: COMMERCIAL

## 2022-07-11 DIAGNOSIS — N39.41 URGE INCONTINENCE: Primary | ICD-10-CM

## 2022-07-11 PROCEDURE — 99207 PR NO CHARGE LOS: CPT | Performed by: UROLOGY

## 2022-07-24 DIAGNOSIS — B37.2 INTERTRIGINOUS CANDIDIASIS: ICD-10-CM

## 2022-07-25 DIAGNOSIS — I10 ESSENTIAL HYPERTENSION: ICD-10-CM

## 2022-07-25 NOTE — TELEPHONE ENCOUNTER
losartan (COZAAR) 25 MG tablet  Last Written Prescription Date:  1/28/22  Last Fill Quantity: 90,  # refills: 1     Last office visit: 12/29/2021   Follow up recommended:  Six months    [employer]et message sent to patient to request that she makes an appointment with one of Dr. Duque's colleagues.    30 day Rx loaded for review/signature.  Appointment required for further refills.

## 2022-07-25 NOTE — TELEPHONE ENCOUNTER
Routing refill request to provider for review/approval because:  Drug not on the [facility] refill protocol   Please authorize if appropriate.  Thanks,  Namita Love RN

## 2022-07-26 ENCOUNTER — TRANSFERRED RECORDS (OUTPATIENT)
Dept: HEALTH INFORMATION MANAGEMENT | Facility: CLINIC | Age: 77
End: 2022-07-26

## 2022-07-26 RX ORDER — CLOTRIMAZOLE 1 %
CREAM (GRAM) TOPICAL
Qty: 60 G | Refills: 0 | Status: SHIPPED | OUTPATIENT
Start: 2022-07-26 | End: 2022-08-24

## 2022-07-26 RX ORDER — LOSARTAN POTASSIUM 25 MG/1
25 TABLET ORAL DAILY
Qty: 30 TABLET | Refills: 0 | Status: SHIPPED | OUTPATIENT
Start: 2022-07-26 | End: 2022-08-24

## 2022-07-29 ENCOUNTER — TELEPHONE (OUTPATIENT)
Dept: UROLOGY | Facility: CLINIC | Age: 77
End: 2022-07-29

## 2022-07-29 NOTE — TELEPHONE ENCOUNTER
Renetta Barakat, RN  P [facility] Urology Adult [place]  7/27/22 nurse visit had to be cancelled due to supply for PTNS not available. Call patient to reschedule when supplies available.     Renetta Barakat RN, BSN     Received the above message. Supplies received in clinic. Called and spoke to patient. Nurse visit scheduled for 8/1/22 at 10:30am.    Renetta Barakat RN, BSN

## 2022-08-01 ENCOUNTER — ALLIED HEALTH/NURSE VISIT (OUTPATIENT)
Dept: NURSING | Facility: CLINIC | Age: 77
End: 2022-08-01
Payer: COMMERCIAL

## 2022-08-01 DIAGNOSIS — R39.15 URINARY URGENCY: Primary | ICD-10-CM

## 2022-08-01 DIAGNOSIS — N39.41 URGE INCONTINENCE OF URINE: ICD-10-CM

## 2022-08-01 PROCEDURE — 64566 NEUROELTRD STIM POST TIBIAL: CPT

## 2022-08-01 NOTE — NURSING NOTE
Hiro Carranza comes into clinic today at the request of Dr. Mcgarry Ordering Provider for PTNS for diagnosis of urinary urgency and urge incontinence.    PTNS Treatment    Session: 4 week maintenance  Ankles Used: left  Treatment Setting: 3  Feeling/Response: yes  Toe Flex: yes  Foot Sensation: yes    This service provided today was under the supervising provider of the day Dr. Cain, who was available if needed.    Renetta Barakat RN

## 2022-08-05 ENCOUNTER — OFFICE VISIT (OUTPATIENT)
Dept: UROLOGY | Facility: CLINIC | Age: 77
End: 2022-08-05
Payer: COMMERCIAL

## 2022-08-05 DIAGNOSIS — N39.41 URGE INCONTINENCE: ICD-10-CM

## 2022-08-05 DIAGNOSIS — N39.46 MIXED STRESS AND URGE URINARY INCONTINENCE: Primary | ICD-10-CM

## 2022-08-05 PROCEDURE — 99214 OFFICE O/P EST MOD 30 MIN: CPT | Performed by: PHYSICIAN ASSISTANT

## 2022-08-05 RX ORDER — TOLTERODINE 4 MG/1
4 CAPSULE, EXTENDED RELEASE ORAL DAILY
Qty: 30 CAPSULE | Refills: 11 | Status: SHIPPED | OUTPATIENT
Start: 2022-08-05 | End: 2023-08-17

## 2022-08-05 RX ORDER — VALACYCLOVIR HYDROCHLORIDE 500 MG/1
TABLET, FILM COATED ORAL
COMMUNITY
Start: 2021-08-06 | End: 2022-08-24

## 2022-08-05 RX ORDER — SIMVASTATIN 20 MG
20 TABLET ORAL
COMMUNITY
Start: 2021-07-11 | End: 2022-08-05

## 2022-08-05 NOTE — PROGRESS NOTES
Hiro Carranza's goals for this visit include:   Chief Complaint   Patient presents with     RECHECK     6 month follow up       She requests these members of her care team be copied on today's visit information:     PCP: Marie Robledo    Referring Provider:  No referring provider defined for this encounter.    There were no vitals taken for this visit.    Do you need any medication refills at today's visit?     Nova Wang LPN on 8/5/2022 at 3:18 PM
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
## 2022-08-05 NOTE — PATIENT INSTRUCTIONS
UROLOGY CLINIC VISIT PATIENT INSTRUCTIONS    Resume Detrol LA (tolterodine) 4 mg daily. A new prescription was sent to your pharmacy.    Continue mirabegron (Myrbetriq) 25 mg daily.    The name of the other medication that is not currently on your formulary: Vibegron (Gemtesa)    Physical Therapy Referral    Please call [phone] to make an appointment     Lili Victor,  PT is the name of the physical therapist you saw previously.     [facility] - [email or web address]  [facility] - [email or web address]    Locations:    [facility]   [facility]          Follow up in 6 months with Nohemy Ge PA-C or Dr. Mcgarry.      If you have any issues, questions or concerns in the meantime, do not hesitate to contact us at [phone] or via [email or web address].     It was a pleasure meeting with you today.  Thank you for allowing me and my team the privilege of caring for you today.  YOU are the reason we are here, and I truly hope we provided you with the excellent service you deserve.  Please let us know if there is anything else we can do for you so that we can be sure you are leaving completely satisfied with your care experience.

## 2022-08-17 DIAGNOSIS — I10 ESSENTIAL HYPERTENSION: ICD-10-CM

## 2022-08-19 RX ORDER — LOSARTAN POTASSIUM 25 MG/1
TABLET ORAL
Qty: 30 TABLET | Refills: 0 | OUTPATIENT
Start: 2022-08-19

## 2022-08-19 NOTE — TELEPHONE ENCOUNTER
Pt need appt for refills.    Alba GOULDN, RN    [facility]  Vascular [facility]  Office: [phone]  Fax: [phone]

## 2022-08-24 ENCOUNTER — OFFICE VISIT (OUTPATIENT)
Dept: FAMILY MEDICINE | Facility: CLINIC | Age: 77
End: 2022-08-24
Payer: COMMERCIAL

## 2022-08-24 VITALS
DIASTOLIC BLOOD PRESSURE: 71 MMHG | HEIGHT: 67 IN | OXYGEN SATURATION: 96 % | SYSTOLIC BLOOD PRESSURE: 111 MMHG | WEIGHT: 258.1 LBS | TEMPERATURE: 97.5 F | RESPIRATION RATE: 16 BRPM | BODY MASS INDEX: 40.51 KG/M2 | HEART RATE: 79 BPM

## 2022-08-24 DIAGNOSIS — N39.46 MIXED STRESS AND URGE URINARY INCONTINENCE: ICD-10-CM

## 2022-08-24 DIAGNOSIS — Z00.00 ENCOUNTER FOR MEDICARE ANNUAL WELLNESS EXAM: Primary | ICD-10-CM

## 2022-08-24 DIAGNOSIS — E66.01 MORBID OBESITY (H): ICD-10-CM

## 2022-08-24 DIAGNOSIS — I87.2 VENOUS (PERIPHERAL) INSUFFICIENCY: ICD-10-CM

## 2022-08-24 DIAGNOSIS — K21.00 GASTROESOPHAGEAL REFLUX DISEASE WITH ESOPHAGITIS, UNSPECIFIED WHETHER HEMORRHAGE: ICD-10-CM

## 2022-08-24 DIAGNOSIS — B37.2 INTERTRIGINOUS CANDIDIASIS: ICD-10-CM

## 2022-08-24 DIAGNOSIS — M85.80 OSTEOPENIA, UNSPECIFIED LOCATION: ICD-10-CM

## 2022-08-24 DIAGNOSIS — I10 ESSENTIAL HYPERTENSION: ICD-10-CM

## 2022-08-24 DIAGNOSIS — Z78.0 ASYMPTOMATIC MENOPAUSAL STATE: ICD-10-CM

## 2022-08-24 DIAGNOSIS — Z23 ENCOUNTER FOR IMMUNIZATION: ICD-10-CM

## 2022-08-24 DIAGNOSIS — E78.5 HYPERLIPIDEMIA LDL GOAL <130: ICD-10-CM

## 2022-08-24 DIAGNOSIS — K22.710 BARRETT'S ESOPHAGUS WITH LOW GRADE DYSPLASIA: ICD-10-CM

## 2022-08-24 DIAGNOSIS — A60.04 HERPES SIMPLEX VULVOVAGINITIS: ICD-10-CM

## 2022-08-24 LAB
ANION GAP SERPL CALCULATED.3IONS-SCNC: 9 MMOL/L (ref 3–14)
BUN SERPL-MCNC: 16 MG/DL (ref 7–30)
CALCIUM SERPL-MCNC: 9.1 MG/DL (ref 8.5–10.1)
CHLORIDE BLD-SCNC: 105 MMOL/L (ref 94–109)
CHOLEST SERPL-MCNC: 142 MG/DL
CO2 SERPL-SCNC: 25 MMOL/L (ref 20–32)
CREAT SERPL-MCNC: 0.65 MG/DL (ref 0.52–1.04)
FASTING STATUS PATIENT QL REPORTED: YES
GFR SERPL CREATININE-BSD FRML MDRD: >90 ML/MIN/1.73M2
GLUCOSE BLD-MCNC: 98 MG/DL (ref 70–99)
HDLC SERPL-MCNC: 73 MG/DL
LDLC SERPL CALC-MCNC: 52 MG/DL
NONHDLC SERPL-MCNC: 69 MG/DL
POTASSIUM BLD-SCNC: 3.6 MMOL/L (ref 3.4–5.3)
SODIUM SERPL-SCNC: 139 MMOL/L (ref 133–144)
TRIGL SERPL-MCNC: 83 MG/DL

## 2022-08-24 PROCEDURE — G0009 ADMIN PNEUMOCOCCAL VACCINE: HCPCS | Performed by: FAMILY MEDICINE

## 2022-08-24 PROCEDURE — 80061 LIPID PANEL: CPT | Performed by: FAMILY MEDICINE

## 2022-08-24 PROCEDURE — 99214 OFFICE O/P EST MOD 30 MIN: CPT | Mod: 25 | Performed by: FAMILY MEDICINE

## 2022-08-24 PROCEDURE — G0439 PPPS, SUBSEQ VISIT: HCPCS | Performed by: FAMILY MEDICINE

## 2022-08-24 PROCEDURE — 80048 BASIC METABOLIC PNL TOTAL CA: CPT | Performed by: FAMILY MEDICINE

## 2022-08-24 PROCEDURE — 36415 COLL VENOUS BLD VENIPUNCTURE: CPT | Performed by: FAMILY MEDICINE

## 2022-08-24 PROCEDURE — 90677 PCV20 VACCINE IM: CPT | Performed by: FAMILY MEDICINE

## 2022-08-24 PROCEDURE — 82043 UR ALBUMIN QUANTITATIVE: CPT | Performed by: FAMILY MEDICINE

## 2022-08-24 RX ORDER — VALACYCLOVIR HYDROCHLORIDE 500 MG/1
TABLET, FILM COATED ORAL
Qty: 36 TABLET | Refills: 1 | Status: SHIPPED | OUTPATIENT
Start: 2022-08-24 | End: 2023-03-22

## 2022-08-24 RX ORDER — CLOTRIMAZOLE 1 %
CREAM (GRAM) TOPICAL
Qty: 113 G | Refills: 1 | Status: SHIPPED | OUTPATIENT
Start: 2022-08-24 | End: 2024-05-08

## 2022-08-24 RX ORDER — HYDROCHLOROTHIAZIDE 25 MG/1
25 TABLET ORAL DAILY
Qty: 90 TABLET | Refills: 1 | Status: SHIPPED | OUTPATIENT
Start: 2022-08-24 | End: 2023-02-15

## 2022-08-24 RX ORDER — LOSARTAN POTASSIUM 25 MG/1
25 TABLET ORAL DAILY
Qty: 90 TABLET | Refills: 1 | Status: SHIPPED | OUTPATIENT
Start: 2022-08-24 | End: 2023-01-23

## 2022-08-24 ASSESSMENT — ENCOUNTER SYMPTOMS
PALPITATIONS: 0
COUGH: 0
DIZZINESS: 0
SORE THROAT: 0
HEMATURIA: 0
NERVOUS/ANXIOUS: 1
DIARRHEA: 0
ABDOMINAL PAIN: 0
HEADACHES: 0
JOINT SWELLING: 0
PARESTHESIAS: 0
FEVER: 0
CHILLS: 0
WEAKNESS: 0
FREQUENCY: 1
NAUSEA: 0
BREAST MASS: 0
SHORTNESS OF BREATH: 0
MYALGIAS: 0
HEARTBURN: 0
EYE PAIN: 0
HEMATOCHEZIA: 0
DYSURIA: 0
ARTHRALGIAS: 1
CONSTIPATION: 0

## 2022-08-24 ASSESSMENT — PAIN SCALES - GENERAL: PAINLEVEL: NO PAIN (0)

## 2022-08-24 ASSESSMENT — ACTIVITIES OF DAILY LIVING (ADL): CURRENT_FUNCTION: PREPARING MEALS REQUIRES ASSISTANCE
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
## 2022-08-24 NOTE — PATIENT INSTRUCTIONS
Patient Education   Personalized Prevention Plan  You are due for the preventive services outlined below.  Your care team is available to assist you in scheduling these services.  If you have already completed any of these items, please share that information with your care team to update in your medical record.  Health Maintenance Due   Topic Date Due     ANNUAL REVIEW OF HM ORDERS  Never done     Pneumococcal Vaccine (2 - PPSV23 or PCV20) 07/01/2016     Annual Wellness Visit  09/08/2021

## 2022-08-24 NOTE — Clinical Note
Hello, I just saw Thao today, and she noted that she wasn't able to get the compounded estriol as it was sent to the [facility] pharmacy.  Could you resend to the compounding pharmacy for her and let her know? Thank you! Mitchell Robledo MD

## 2022-08-25 DIAGNOSIS — N39.46 MIXED STRESS AND URGE URINARY INCONTINENCE: ICD-10-CM

## 2022-08-25 LAB
CREAT UR-MCNC: 107 MG/DL
MICROALBUMIN UR-MCNC: 6 MG/L
MICROALBUMIN/CREAT UR: 5.61 MG/G CR (ref 0–25)

## 2022-08-25 NOTE — PROGRESS NOTES
Called and spoke to patient who is aware that the compounded estrogen cream has been sent to [facility] and that the pharmacy will be contacting her to discuss further. Patient verbalized understanding and was grateful for the call.     Renetta Barakat RN, BSN

## 2022-08-25 NOTE — PROGRESS NOTES
Urology team - can you please get her estrogen cream prescription sent to [facility]?    Thanks!   Nohemy Ge PA-C       Received the above message from Nohemy Ge PA-C. Vaginal estrogen cream reordered and sent to [facility] with request to contact patient to discuss prescription.    Renetta Barakat RN, BSN

## 2022-08-29 NOTE — RESULT ENCOUNTER NOTE
Here are your lab results from your recent visit...  -Your microalbumin level (which is the urine test that can signal signs of early chronic kidney disease if elevated to >30) is low which is good.  -Your cholesterol panel looks great with a low LDL (the bad cholesterol) and a good/high HDL (the good cholesterol).   -Your basic metabolic panel (which includes electrolyte levels, blood sugar level and kidney function tests) is normal.    Please let me know if you have any questions.  Best,   Mitchell Robledo MD

## 2022-09-22 ENCOUNTER — THERAPY VISIT (OUTPATIENT)
Dept: PHYSICAL THERAPY | Facility: CLINIC | Age: 77
End: 2022-09-22
Attending: PHYSICIAN ASSISTANT
Payer: COMMERCIAL

## 2022-09-22 DIAGNOSIS — R32 URINARY INCONTINENCE: ICD-10-CM

## 2022-09-22 DIAGNOSIS — M99.05 SOMATIC DYSFUNCTION OF PELVIC REGION: ICD-10-CM

## 2022-09-22 DIAGNOSIS — N39.46 MIXED STRESS AND URGE URINARY INCONTINENCE: ICD-10-CM

## 2022-09-22 PROCEDURE — 97161 PT EVAL LOW COMPLEX 20 MIN: CPT | Mod: GP | Performed by: PHYSICAL THERAPIST

## 2022-09-22 PROCEDURE — 97110 THERAPEUTIC EXERCISES: CPT | Mod: GP | Performed by: PHYSICAL THERAPIST

## 2022-09-22 PROCEDURE — 97530 THERAPEUTIC ACTIVITIES: CPT | Mod: GP | Performed by: PHYSICAL THERAPIST

## 2022-09-22 PROCEDURE — 97112 NEUROMUSCULAR REEDUCATION: CPT | Mod: GP | Performed by: PHYSICAL THERAPIST
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
## 2022-09-23 PROBLEM — R32 URINARY INCONTINENCE: Status: ACTIVE | Noted: 2022-09-23

## 2022-09-23 PROBLEM — M99.05 SOMATIC DYSFUNCTION OF PELVIC REGION: Status: ACTIVE | Noted: 2022-09-23

## 2022-09-23 NOTE — PROGRESS NOTES
[facility]    OUTPATIENT Physical Therapy ORTHOPEDIC EVALUATION  PLAN OF TREATMENT FOR OUTPATIENT REHABILITATION  (COMPLETE FOR INITIAL CLAIMS ONLY)  Patient's Last Name, First Name, M.I.  YOB: 1945  Hiro Carranza    Provider s Name:  [facility]   Medical Record No.  [id number]   Start of Care Date:  09/22/22   Onset Date:   08/05/22 (saw MD)   Type:     _X__PT   ___OT Medical Diagnosis:    Encounter Diagnosis   Name Primary?    Mixed stress and urge urinary incontinence         Treatment Diagnosis:  PF dysfunction; Incontinence        Goals:     09/22/22 0700   Urinary Leakage   Current Functional Level Number of urinary leakage episodes in a day   Performance Level 2   STG Target Performance Decrease urinary leakage episodes in a week to   Performance Level 3   Rationale continence throughout the day   Due Date 11/04/22   LTG Target Performance Decrease urinary leakage episodes in a month to   Performance Level 1   Rationale continence throughout the day   Due Date 12/15/22       Therapy Frequency:  1x/week  Predicted Duration of Therapy Intervention:  12 weeks    Renetta Velarde, PT                 I CERTIFY THE NEED FOR THESE SERVICES FURNISHED UNDER        THIS PLAN OF TREATMENT AND WHILE UNDER MY CARE     (Physician attestation of this document indicates review and certification of the therapy plan).                     Certification Date From:  09/22/22   Certification Date To:  12/15/22    Referring Provider:  Nohemy Ge    Initial Assessment        See Epic Evaluation SOC Date: 09/22/22

## 2022-10-06 ENCOUNTER — THERAPY VISIT (OUTPATIENT)
Dept: PHYSICAL THERAPY | Facility: CLINIC | Age: 77
End: 2022-10-06
Payer: COMMERCIAL

## 2022-10-06 DIAGNOSIS — M99.05 SOMATIC DYSFUNCTION OF PELVIC REGION: Primary | ICD-10-CM

## 2022-10-06 DIAGNOSIS — R32 URINARY INCONTINENCE: ICD-10-CM

## 2022-10-06 PROCEDURE — 97110 THERAPEUTIC EXERCISES: CPT | Mod: GP | Performed by: PHYSICAL THERAPIST

## 2022-10-06 PROCEDURE — 97530 THERAPEUTIC ACTIVITIES: CPT | Mod: GP | Performed by: PHYSICAL THERAPIST

## 2022-10-06 PROCEDURE — 97112 NEUROMUSCULAR REEDUCATION: CPT | Mod: GP | Performed by: PHYSICAL THERAPIST

## 2022-10-27 ENCOUNTER — VIRTUAL VISIT (OUTPATIENT)
Dept: FAMILY MEDICINE | Facility: CLINIC | Age: 77
End: 2022-10-27
Payer: COMMERCIAL

## 2022-10-27 DIAGNOSIS — J20.9 ACUTE BRONCHITIS WITH SYMPTOMS > 10 DAYS: Primary | ICD-10-CM

## 2022-10-27 PROCEDURE — 99213 OFFICE O/P EST LOW 20 MIN: CPT | Mod: 95 | Performed by: FAMILY MEDICINE

## 2022-10-27 RX ORDER — AZITHROMYCIN 250 MG/1
TABLET, FILM COATED ORAL
Qty: 6 TABLET | Refills: 0 | Status: SHIPPED | OUTPATIENT
Start: 2022-10-27 | End: 2022-11-01

## 2022-10-27 NOTE — PROGRESS NOTES
Hiro Bloom is a 77 year old who is being evaluated via a billable video visit.      How would you like to obtain your AVS? MyChart  If the video visit is dropped, the invitation should be resent by: Text to cell phone: [phone]  Will anyone else be joining your video visit? No          Assessment & Plan     Acute bronchitis with symptoms > 10 days   given duration of symptoms (4 weeks) and still coughing up thick yellow mucous, will cover for atypicals with azithro.    Patient agrees  If not improving, needs to see PCP for exam, possible xray, etc    - azithromycin (ZITHROMAX) 250 MG tablet; Take 2 tablets (500 mg) by mouth daily for 1 day, THEN 1 tablet (250 mg) daily for 4 days.                 No follow-ups on file.    Aby Snyder MD  [facility]   Hiro Bloom is a 77 year old, presenting for the following health issues:  Video Visit (Cough)      HPI     Acute Illness  Acute illness concerns: Cough  Onset/Duration: 1 month  Symptoms:  Fever: No  Chills/Sweats: No  Headache (location?): No  Sinus Pressure: No  Conjunctivitis:  No  Ear Pain: no  Rhinorrhea: YES  Congestion: YES  Sore Throat: No  Cough: YES-productive of yellow sputum  Wheeze: YES  Decreased Appetite: No  Nausea: No  Vomiting: No  Diarrhea: No  Dysuria/Freq.: No  Dysuria or Hematuria: No  Fatigue/Achiness: YES- fatigue  Sick/Strep Exposure: YES-  with cough  Therapies tried and outcome: Mucinex, cough drops    Lots of phlegm.    No sinus pain/pressure.     Review of Systems   Constitutional, HEENT, cardiovascular, pulmonary, gi and gu systems are negative, except as otherwise noted.      Objective           Vitals:  No vitals were obtained today due to virtual visit.    Physical Exam   GENERAL: Healthy, alert and no distress  EYES: Eyes grossly normal to inspection.  No discharge or erythema, or obvious scleral/conjunctival abnormalities.  RESP: No audible wheeze, cough, or visible cyanosis.  No  visible retractions or increased work of breathing.    SKIN: Visible skin clear. No significant rash, abnormal pigmentation or lesions.  NEURO: Cranial nerves grossly intact.  Mentation and speech appropriate for age.  PSYCH: Mentation appears normal, affect normal/bright, judgement and insight intact, normal speech and appearance well-groomed.                Video-Visit Details    Video Start Time: 432 pm    Type of service:  Video Visit    Video End Time:4:38 PM    Originating Location (pt. Location): Home    Distant Location (provider location):  On-site    Platform used for Video Visit: Emmanuel

## 2022-10-28 ENCOUNTER — TELEPHONE (OUTPATIENT)
Dept: FAMILY MEDICINE | Facility: CLINIC | Age: 77
End: 2022-10-28

## 2022-10-28 NOTE — TELEPHONE ENCOUNTER
"----- Message from Socorro Beltran sent at 10/28/2022 10:10 AM CDT -----  Regarding: lab appt 11/2/22  Hey,    Patient is schedule for \"fasting labs per Venkat\" but she already had her labs drawn in August. I couldn't find any notes she requires a redraw or any future appointments with Venkat. I think this appointment needs to be cancelled and the patient called. Can you advise?    Thanks,  Curry Quintanilla    "

## 2022-10-28 NOTE — TELEPHONE ENCOUNTER
Spoke with patient.  Informed her Lipids and other lab tests done 8/24/22 were normal.  No need to recheck    Patient verbalizes understanding.  11/2 lab appointment cancelled.    Aarti Black RN

## 2022-11-22 PROBLEM — M99.05 SOMATIC DYSFUNCTION OF PELVIC REGION: Status: RESOLVED | Noted: 2022-09-23 | Resolved: 2022-11-22

## 2022-11-22 PROBLEM — R32 URINARY INCONTINENCE: Status: RESOLVED | Noted: 2022-09-23 | Resolved: 2022-11-22

## 2022-11-22 NOTE — PROGRESS NOTES
Discharge Note    Progress reporting period is from initial evaluation date (please see noted date below) to Oct 6, 2022.  Linked Episodes   Type: Episode: Status: Noted: Resolved: Last update: Updated by:   PHYSICAL THERAPY pelvic dysfunction Active 9/22/2022  10/6/2022  1:47 PM Renetta Velarde, PT      Comments:       Hiro failed to follow up and current status is unknown.  Please see information below for last relevant information on current status.  Patient seen for 2 visits.    SUBJECTIVE  Subjective changes noted by patient:  Patient reports she has not been able to complete her HEP due to personal issues.  Would like to review exercises.  Was able to complete bladdr diary.  .  Current pain level is 0/10.     Previous pain level was  0/10.   Changes in function:  Yes (See Goal flowsheet attached for changes in current functional level)  Adverse reaction to treatment or activity: None    OBJECTIVE  Changes noted in objective findings: BD: shows voiding 6 ounces every 2.65 hours.  Input is 42 ounces with 76% BI.  We discussed reducing BI and increasing water and also focus on voiding every 2-3 hours.  Needed verbal ques to assist with correct PF [id number] specifically in sitting to avoid using glutes/add musculature     ASSESSMENT/PLAN  Diagnosis: PF dysfunction; Incontinence   Updated problem list and treatment plan:     STG/LTGs have been met or progress has been made towards goals:  Yes, please see goal flowsheet for most current information  Assessment of Progress: current status is unknown.    Last current status: Pt has not made progress   Self Management Plans:  HEP  I have re-evaluated this patient and find that the nature, scope, duration and intensity of the therapy is appropriate for the medical condition of the patient.  Hiro continues to require the following intervention to meet STG and LTG's:  HEP.    Recommendations:  Discharge with current home program.  Patient to follow up with MD as  needed.    Please refer to the daily flowsheet for treatment today, total treatment time and time spent performing 1:1 timed codes.

## 2022-12-14 DIAGNOSIS — E78.5 HYPERLIPIDEMIA LDL GOAL <130: ICD-10-CM

## 2022-12-14 NOTE — TELEPHONE ENCOUNTER
Routing refill request to provider for review/approval because:  Previously prescribed by other provider  Provider no longer at practice  Would like PCP to take over Rx if possible  Debo ARIAS RN

## 2022-12-16 RX ORDER — ROSUVASTATIN CALCIUM 20 MG/1
20 TABLET, COATED ORAL DAILY
Qty: 30 TABLET | Refills: 2 | Status: SHIPPED | OUTPATIENT
Start: 2022-12-16 | End: 2023-03-22

## 2022-12-16 NOTE — TELEPHONE ENCOUNTER
Left message for patient to call [facility] back regarding refill request  Debo ARIAS RN

## 2022-12-16 NOTE — TELEPHONE ENCOUNTER
Last saw pt in 8/22, and she still had leg swelling/vascular concerns.  Even if her provider has left, she should still follow-up with vascular- they vascular office has reminded her to make appts with a new provider.      I am fine prescribing her crestor and losartan medications for a short time to get her to a new provider, but want her to make an appt. Sent in crestor today.     Please let pt know to make an appt.  Eventually I can likely take over these rx's but want to make sure she has the follow-up she needs and they feel she is stable.    Pt need appt for refills.     Alba PHILIPPE, RN     [facility]  Office: [phone]  Fax: [phone]            Thanks!  CW   none

## 2022-12-20 ENCOUNTER — TELEPHONE (OUTPATIENT)
Dept: FAMILY MEDICINE | Facility: CLINIC | Age: 77
End: 2022-12-20

## 2022-12-20 NOTE — TELEPHONE ENCOUNTER
Pt was calling back for a phone message that she received regarding her refill. Pt states that she has already got her refill taken care of.     Denise Han RN  [facility]

## 2022-12-29 ENCOUNTER — TELEPHONE (OUTPATIENT)
Dept: FAMILY MEDICINE | Facility: CLINIC | Age: 77
End: 2022-12-29

## 2022-12-29 NOTE — TELEPHONE ENCOUNTER
Forms/Letter Request    Type of form/letter:   [facility]  [facility]  Medicare annual wellness visit    Have you been seen for this request: Yes     Do we have the form/letter:   Mailed to the [facility] by patient.    When is form/letter needed by: n/a    How would you like the form/letter returned:   Mail to [facility] in the provided business reply envelope.    Patient Notified form requests are processed in 3-5 business days:No    Could we send this information to you in [employer] or would you prefer to receive a phone call?:   N/a    Placed in Dr. Robledo's box.

## 2023-02-13 DIAGNOSIS — N39.46 MIXED STRESS AND URGE URINARY INCONTINENCE: ICD-10-CM

## 2023-02-13 RX ORDER — MIRABEGRON 25 MG/1
25 TABLET, FILM COATED, EXTENDED RELEASE ORAL DAILY
Qty: 30 TABLET | Refills: 5 | Status: SHIPPED | OUTPATIENT
Start: 2023-02-13 | End: 2023-08-16

## 2023-02-13 NOTE — TELEPHONE ENCOUNTER
Myrbetriq Oral Tablet Extended Release 24 Hour 25 MG    Orders Only  8/25/2022  [facility]     Next appt: 3/24/23

## 2023-02-15 ENCOUNTER — TELEPHONE (OUTPATIENT)
Dept: FAMILY MEDICINE | Facility: CLINIC | Age: 78
End: 2023-02-15

## 2023-02-15 ENCOUNTER — VIRTUAL VISIT (OUTPATIENT)
Dept: FAMILY MEDICINE | Facility: CLINIC | Age: 78
End: 2023-02-15
Payer: COMMERCIAL

## 2023-02-15 DIAGNOSIS — B34.9 VIRAL ILLNESS: ICD-10-CM

## 2023-02-15 DIAGNOSIS — J02.9 SORE THROAT: Primary | ICD-10-CM

## 2023-02-15 DIAGNOSIS — I87.2 VENOUS (PERIPHERAL) INSUFFICIENCY: ICD-10-CM

## 2023-02-15 DIAGNOSIS — Z78.0 ASYMPTOMATIC MENOPAUSAL STATE: ICD-10-CM

## 2023-02-15 DIAGNOSIS — I10 ESSENTIAL HYPERTENSION: ICD-10-CM

## 2023-02-15 PROCEDURE — 99214 OFFICE O/P EST MOD 30 MIN: CPT | Mod: VID | Performed by: FAMILY MEDICINE

## 2023-02-15 RX ORDER — HYDROCHLOROTHIAZIDE 25 MG/1
25 TABLET ORAL DAILY
Qty: 30 TABLET | Refills: 1 | Status: SHIPPED | OUTPATIENT
Start: 2023-02-15 | End: 2023-03-22

## 2023-02-15 RX ORDER — LOSARTAN POTASSIUM 25 MG/1
25 TABLET ORAL DAILY
Qty: 30 TABLET | Refills: 1 | Status: SHIPPED | OUTPATIENT
Start: 2023-02-15 | End: 2023-03-22

## 2023-02-15 NOTE — Clinical Note
Could you schedule pt for follow-up HTN/med check appt on 3/22 (wed) at 10:30am, in clinic appt? Thank you! CW

## 2023-02-15 NOTE — TELEPHONE ENCOUNTER
TC,      Please see message below.  Can someone please fax labs orders?      Thank you,  Aarti Black RN

## 2023-02-15 NOTE — TELEPHONE ENCOUNTER
Printed Lab Orders and Faxed to [facility] at [phone]  [facility] Coordinator

## 2023-02-15 NOTE — TELEPHONE ENCOUNTER
Patient calling because she would like the lab orders that Dr. Robledo placed today sent to a clinic that is much closer to her.    She would like them sent to [facility] in [facility] fax: [phone]  Main phone: [phone]    Melissa Barrera RN  [facility]

## 2023-02-15 NOTE — PROGRESS NOTES
Hiro Bloom is a 77 year old who is being evaluated via a billable video visit.      How would you like to obtain your AVS? MyChart  If the video visit is dropped, the invitation should be resent by: Text to cell phone: [phone]  Will anyone else be joining your video visit? No        Assessment & Plan       ICD-10-CM    1. Sore throat  J02.9 Influenza A & B Antigen (Lab collect)     Symptomatic COVID-19 Virus (Coronavirus) by PCR     Streptococcus A Rapid Screen w/Reflex to PCR      2. Viral illness  B34.9 Influenza A & B Antigen (Lab collect)     Symptomatic COVID-19 Virus (Coronavirus) by PCR     Streptococcus A Rapid Screen w/Reflex to PCR      3. Asymptomatic menopausal state  Z78.0 DX Hip/Pelvis/Spine      4. Essential hypertension  I10 losartan (COZAAR) 25 MG tablet     hydrochlorothiazide (HYDRODIURIL) 25 MG tablet      5. Venous (peripheral) insufficiency  I87.2            Sore throat/runny nose, no other sx's,  with bronchitis sx's.  Will send for testing for strep/flu/COVID.    They are in [place]- hopefully can find site that is not too far for her.  Treat as indicated based on results.    Also noted she is for follow-up for HTN/med check.  Will have team schedule her for 3/22/23 appt- refills sent in meantime.    Pt also didn't get dexa scheduled, and would like to do mammogram.  Last dexa at [facility]- will send another order, and schedule mammogram at same time.    Venous insufficiency, h/o stasis ulcer of LE-   Had been following with Dr. Duque, but sounds like he has left.  Pt will call office to schedule with new provider there.        I spent a total of 34 minutes on the day of the visit.   Time spent doing chart review, history and exam, documentation and further activities per the note      Return in about 5 weeks (around 3/22/2023) for Blood Pressure Recheck, Chronic cares/Med check.    Marie Robledo MD  [facility]  Merle is a 77 year old, presenting for the following health issues:  Pharyngitis    Sore throat sx's.   Worried because her  is on dialysis.   has had a cough, bronchitis.  Tested last week for COVID and two other things- all negative.  He had been on a fishing trip the previous weekend, sick by last Mon.  He did go on azithromycin on Friday.    She hasn't had any coughing.  Just a sore throat through the night.  Little runny nose.  Temp 98.2F.  Hasn't taken any pain meds.  Lozenge for throat helped a bit.    In past, sore throat sx's have led into losing her voice.  Has used azithromycin in the past - a bunch per pt, but review of chart, no abx since '08 for sore throat.        History of Present Illness       Reason for visit:  Sore throat.  Symptom onset:  1-3 days ago  Symptoms include:  Sore throat, some nasal dripping  Symptom intensity:  Moderate  Symptom progression:  Improving  Had these symptoms before:  Yes  Has tried/received treatment for these symptoms:  Yes  Previous treatment was successful:  Yes  Prior treatment description:  Z-pack or similar  What makes it worse:  Not at present  What makes it better:  Musinex sore throat lozenges    She eats 2-3 servings of fruits and vegetables daily.She consumes 0 sweetened beverage(s) daily.She exercises with enough effort to increase her heart rate 9 or less minutes per day.  She exercises with enough effort to increase her heart rate 3 or less days per week.   She is taking medications regularly.       Review of Systems   Constitutional, HEENT, cardiovascular, pulmonary, gi and gu systems are negative, except as otherwise noted.      Objective           Vitals:  No vitals were obtained today due to virtual visit.    Physical Exam   GENERAL: Healthy, alert and no distress  EYES: Eyes grossly normal to inspection.  No discharge or erythema, or obvious scleral/conjunctival abnormalities.  RESP: No audible wheeze, cough, or visible cyanosis.  No  visible retractions or increased work of breathing.    SKIN: Visible skin clear. No significant rash, abnormal pigmentation or lesions.  NEURO: Cranial nerves grossly intact.  Mentation and speech appropriate for age.  PSYCH: Mentation appears normal, affect normal/bright, judgement and insight intact, normal speech and appearance well-groomed.            Video-Visit Details    Type of service:  Video Visit   Video Start Time:~ 12:10 PM  Video End Time: ~12:38 PM    Originating Location (pt. Location): Home  Distant Location (provider location):  On-site  Platform used for Video Visit: Emmanuel

## 2023-03-22 ENCOUNTER — OFFICE VISIT (OUTPATIENT)
Dept: FAMILY MEDICINE | Facility: CLINIC | Age: 78
End: 2023-03-22
Payer: COMMERCIAL

## 2023-03-22 ENCOUNTER — TELEPHONE (OUTPATIENT)
Dept: UROLOGY | Facility: CLINIC | Age: 78
End: 2023-03-22

## 2023-03-22 VITALS
SYSTOLIC BLOOD PRESSURE: 120 MMHG | RESPIRATION RATE: 16 BRPM | BODY MASS INDEX: 42.22 KG/M2 | HEIGHT: 67 IN | HEART RATE: 68 BPM | DIASTOLIC BLOOD PRESSURE: 74 MMHG | OXYGEN SATURATION: 99 % | TEMPERATURE: 97.3 F | WEIGHT: 269 LBS

## 2023-03-22 DIAGNOSIS — K22.710 BARRETT'S ESOPHAGUS WITH LOW GRADE DYSPLASIA: ICD-10-CM

## 2023-03-22 DIAGNOSIS — K21.00 GASTROESOPHAGEAL REFLUX DISEASE WITH ESOPHAGITIS WITHOUT HEMORRHAGE: ICD-10-CM

## 2023-03-22 DIAGNOSIS — J06.9 VIRAL UPPER RESPIRATORY TRACT INFECTION: Primary | ICD-10-CM

## 2023-03-22 DIAGNOSIS — I10 ESSENTIAL HYPERTENSION: ICD-10-CM

## 2023-03-22 DIAGNOSIS — E78.5 HYPERLIPIDEMIA LDL GOAL <130: ICD-10-CM

## 2023-03-22 DIAGNOSIS — A60.04 HERPES SIMPLEX VULVOVAGINITIS: ICD-10-CM

## 2023-03-22 PROCEDURE — 99214 OFFICE O/P EST MOD 30 MIN: CPT | Performed by: FAMILY MEDICINE

## 2023-03-22 RX ORDER — VALACYCLOVIR HYDROCHLORIDE 500 MG/1
TABLET, FILM COATED ORAL
Qty: 36 TABLET | Refills: 1 | Status: SHIPPED | OUTPATIENT
Start: 2023-03-22 | End: 2023-10-13

## 2023-03-22 RX ORDER — HYDROCHLOROTHIAZIDE 25 MG/1
25 TABLET ORAL DAILY
Qty: 90 TABLET | Refills: 1 | Status: SHIPPED | OUTPATIENT
Start: 2023-03-22 | End: 2023-10-10

## 2023-03-22 RX ORDER — LOSARTAN POTASSIUM 25 MG/1
25 TABLET ORAL DAILY
Qty: 90 TABLET | Refills: 1 | Status: SHIPPED | OUTPATIENT
Start: 2023-03-22 | End: 2023-10-10

## 2023-03-22 RX ORDER — ROSUVASTATIN CALCIUM 20 MG/1
20 TABLET, COATED ORAL DAILY
Qty: 90 TABLET | Refills: 1 | Status: SHIPPED | OUTPATIENT
Start: 2023-03-22 | End: 2023-10-10

## 2023-03-22 ASSESSMENT — PAIN SCALES - GENERAL: PAINLEVEL: NO PAIN (1)

## 2023-03-22 NOTE — TELEPHONE ENCOUNTER
Called and spoke to patient who is aware that a video visit for her follow up is okay. 3/24/23 appointment with Nohemy Ge PA-C changed to a video visit. Patient was grateful for the call.    Renetta Barakat RN, BSN

## 2023-03-22 NOTE — PROGRESS NOTES
Assessment & Plan       ICD-10-CM    1. Essential hypertension  I10 losartan (COZAAR) 25 MG tablet     hydrochlorothiazide (HYDRODIURIL) 25 MG tablet     PRIMARY CARE FOLLOW-UP SCHEDULING      2. Herpes simplex vulvovaginitis  A60.04 valACYclovir (VALTREX) 500 MG tablet     PRIMARY CARE FOLLOW-UP SCHEDULING      3. Hyperlipidemia LDL goal <130  E78.5 rosuvastatin (CRESTOR) 20 MG tablet     PRIMARY CARE FOLLOW-UP SCHEDULING      4. Stallings's esophagus with low grade dysplasia  K22.710 omeprazole (PRILOSEC) 20 MG DR capsule     PRIMARY CARE FOLLOW-UP SCHEDULING      5. Gastroesophageal reflux disease with esophagitis without hemorrhage  K21.00 omeprazole (PRILOSEC) 20 MG DR capsule     PRIMARY CARE FOLLOW-UP SCHEDULING         URI/congestion-   7 days in from sx's, late for txt if positive for strep/covid, so will not test.  No s/sx's of pneumonia or sinus infection.  Rec conservative cares, reviewed OTC meds, avoid current one at night.  RTC if symptoms persist or worsen.     HTN- Blood pressure in good control on current meds.  No side effects. Will continue, refills sent.  Continue every six month follow-up.     Lipids- Doing well on rosuvastatin, no se's.  Follow-up in ~8-9/23 for wellness visit.  Labs prior if able to schedule dexa/mammo/labs the week prior to visit, otherwise fasting labs at appt. Future labs ordered.    PAD- continues to wear compression stockings, R calf hematoma still present but stable.    Pt will schedule follow-up with new vascular provider as Dr. Duque has left clinic.    GERD/Stallings's- on q3yr follow-up with GI, cont 2x/day omeprazole.    Incontinence- cont follow-up with urology, has follow-up appt scheduled, but plans to try to reschedule.  Will discuss detrol coverage issues with them.     Follow-up Visit   Expected date:  Sep 22, 2023 (Approximate)      Follow Up Appointment Details:     Follow-up with whom?: Me    Follow-Up for what?: Adult Preventive    Any Additional Chronic  "Condition Management?: Hypertension Comment - med [facility]    How?: In Person                    I spent a total of 34 minutes on the day of the visit.   Time spent by me doing chart review, history and exam, documentation and further activities per the note       BMI:   Estimated body mass index is 42.13 kg/m  as calculated from the following:    Height as of this encounter: 1.702 m (5' 7\").    Weight as of this encounter: 122 kg (269 lb).   Weight management plan: Discussed healthy diet and exercise guidelines      Marie Robledo MD  [facility]   Hiro Bloom is a 77 year old, presenting for the following health issues:  URI  Chronic cares.    History of Present Illness       Reason for visit:  Regular checkup, plus now a bad cold  Symptom onset:  3-7 days ago  Symptoms include:  Sore throat, cough, runny nose, thick mucus, loss of voice, has not been tested covid  Symptom intensity:  Moderate  Symptom progression:  Worsening  Had these symptoms before:  Yes  Has tried/received treatment for these symptoms:  Yes  Previous treatment was successful:  Yes  Prior treatment description:  \"Z-Pack\" or other  What makes it worse:  Lying down on my back  What makes it better:  Sleep    She eats 2-3 servings of fruits and vegetables daily.She consumes 0 sweetened beverage(s) daily.She exercises with enough effort to increase her heart rate 9 or less minutes per day.  She exercises with enough effort to increase her heart rate 3 or less days per week.   She is taking medications regularly.       Sick with cold sx's for about a week.   got 'bronchitis'.  Sx's now- throat is most bothersome, congestion, sinuses okay.  Breathing is okay, does have some coughing, no fever.  Hasn't done abx.  Sx's seem about the same, maybe a bit better.     - dialysis 3x/wk, he's overcome some hurdles, more doable.    PAD- has compression stockings for PAD and R calf " "hematoma that is still just hanging out.  Has seen vascular, needs to make appt with a new provider.    HTN - hasn't been checking BPs    Still needs to get down for dexa/mammogram.    Incontinence- has follow-up scheduled soon, but 8:45am, too early.  Planning to call and see if it's possible to reschedule to when she's feeling better and bit later in am.    [facility]- they want to 'transfer her off' omeprazole, and new version of detrol.  One strong tablet has been much better for her than the other option.  Will ask urology.        Review of Systems   Constitutional, HEENT, cardiovascular, pulmonary, gi and gu systems are negative, except as otherwise noted.      Objective    /74 (BP Location: Left arm, Patient Position: Sitting, Cuff Size: Adult Regular)   Pulse 68   Temp 97.3  F (36.3  C) (Temporal)   Resp 16   Ht 1.702 m (5' 7\")   Wt 122 kg (269 lb)   SpO2 99%   BMI 42.13 kg/m    Body mass index is 42.13 kg/m .  Physical Exam   GENERAL: healthy, alert and no distress  EYES: Eyes grossly normal to inspection, PERRL and conjunctivae and sclerae normal  HENT: ear canals and TM's normal, nose and mouth without ulcers or lesions, sinuses nontender to palpation  NECK: no adenopathy, no asymmetry, masses, or scars and thyroid normal to palpation  RESP: lungs clear to auscultation - no rales, rhonchi or wheezes  CV: regular rate and rhythm, normal S1 S2, no S3 or S4, no murmur, click or rub, no peripheral edema and peripheral pulses strong  MS: no gross musculoskeletal defects noted, no edema  SKIN: no suspicious lesions or rashes  PSYCH: mentation appears normal, affect normal/bright    Office Visit on 08/24/2022   Component Date Value Ref Range Status     Sodium 08/24/2022 139  133 - 144 mmol/L Final     Potassium 08/24/2022 3.6  3.4 - 5.3 mmol/L Final     Chloride 08/24/2022 105  94 - 109 mmol/L Final     Carbon Dioxide (CO2) 08/24/2022 25  20 - 32 mmol/L Final     Anion Gap 08/24/2022 9  3 - 14 mmol/L " Final     Urea Nitrogen 08/24/2022 16  7 - 30 mg/dL Final     Creatinine 08/24/2022 0.65  0.52 - 1.04 mg/dL Final     Calcium 08/24/2022 9.1  8.5 - 10.1 mg/dL Final     Glucose 08/24/2022 98  70 - 99 mg/dL Final     GFR Estimate 08/24/2022 >90  >60 mL/min/1.73m2 Final    Effective December 21, 2021 eGFRcr in adults is calculated using the 2021 CKD-EPI creatinine equation which includes age and gender (Eileen et al., NEJ, DOI: 10.1056/RKWXeq9048660)     Creatinine Urine mg/dL 08/24/2022 107  mg/dL Final     Albumin Urine mg/L 08/24/2022 6  mg/L Final     Albumin Urine mg/g Cr 08/24/2022 5.61  0.00 - 25.00 mg/g Cr Final     Cholesterol 08/24/2022 142  <200 mg/dL Final     Triglycerides 08/24/2022 83  <150 mg/dL Final     Direct Measure HDL 08/24/2022 73  >=50 mg/dL Final     LDL Cholesterol Calculated 08/24/2022 52  <=100 mg/dL Final     Non HDL Cholesterol 08/24/2022 69  <130 mg/dL Final     Patient Fasting > 8hrs? 08/24/2022 Yes   Final

## 2023-03-22 NOTE — TELEPHONE ENCOUNTER
[facility] Call Center    Phone Message    May a detailed message be left on voicemail: yes     Reason for Call: Pt scheduled 3/24 9am with Nohemy Ge and isn't feeling well, pt wondering if this can be changed to virtual. Writer unable to update. Please call pt to confirm. Thank you     Action Taken: Message routed to:  Other: Uro    Travel Screening: Not Applicable

## 2023-03-24 ENCOUNTER — VIRTUAL VISIT (OUTPATIENT)
Dept: UROLOGY | Facility: CLINIC | Age: 78
End: 2023-03-24
Attending: PHYSICIAN ASSISTANT
Payer: COMMERCIAL

## 2023-03-24 DIAGNOSIS — N39.46 MIXED STRESS AND URGE URINARY INCONTINENCE: Primary | ICD-10-CM

## 2023-03-24 DIAGNOSIS — N95.2 ATROPHIC VAGINITIS: ICD-10-CM

## 2023-03-24 PROCEDURE — 99213 OFFICE O/P EST LOW 20 MIN: CPT | Mod: VID | Performed by: PHYSICIAN ASSISTANT

## 2023-03-24 RX ORDER — OFLOXACIN 3 MG/ML
SOLUTION/ DROPS OPHTHALMIC
COMMUNITY
Start: 2023-03-23 | End: 2024-05-15

## 2023-03-24 NOTE — PROGRESS NOTES
Virtual Visit Details    Type of service:  Video Visit   Video Start Time: 9:09 AM  Video End Time:9:20 AM    Originating Location (pt. Location): Home    Distant Location (provider location):  On-site  Platform used for Video Visit: Emmanuel

## 2023-03-24 NOTE — PATIENT INSTRUCTIONS
UROLOGY CLINIC VISIT PATIENT INSTRUCTIONS    Continue Detrol (tolterodine) 4 mg once daily    Consider stopping Myrbetriq (mirabegron) when your current supply runs out. If your symptoms do not get worse after stopping Myrbetriq, then no need to be on it. If your symptoms do worsen after stopping Myrbetriq, then you can always resume taking the medication.    Apply a large blueberry size dollop of estrogen cream using your finger to the vagina twice a week before bed.    Work on the pelvic floor exercises as able.    Follow up in 1 year, sooner with any issues.     If you have any issues, questions or concerns in the meantime, do not hesitate to contact us at [phone] or via [employer].     It was a pleasure meeting with you today.  Thank you for allowing me and my team the privilege of caring for you today.  YOU are the reason we are here, and I truly hope we provided you with the excellent service you deserve.  Please let us know if there is anything else we can do for you so that we can be sure you are leaving completely satisfied with your care experience.

## 2023-03-24 NOTE — NURSING NOTE
Is the patient currently in the state of [place]? YES    Visit mode:VIDEO    If the visit is dropped, the patient can be reconnected by: VIDEO VISIT: Text to cell phone: [phone]    Will anyone else be joining the visit? NO      How would you like to obtain your AVS? MyChart    Are changes needed to the allergy or medication list? YES, Pt also stated that they are taking daily apple cider vinegar capsules    Reason for visit:   Chief Complaint   Patient presents with     Video Visit     6 month follow-up, mixed stress and urge incontinence

## 2023-07-25 ENCOUNTER — PATIENT OUTREACH (OUTPATIENT)
Dept: CARE COORDINATION | Facility: CLINIC | Age: 78
End: 2023-07-25
Payer: COMMERCIAL

## 2023-08-08 ENCOUNTER — PATIENT OUTREACH (OUTPATIENT)
Dept: CARE COORDINATION | Facility: CLINIC | Age: 78
End: 2023-08-08
Payer: COMMERCIAL

## 2023-08-10 ENCOUNTER — HOSPITAL ENCOUNTER (OUTPATIENT)
Dept: MAMMOGRAPHY | Facility: CLINIC | Age: 78
Discharge: HOME | End: 2023-08-10
Attending: FAMILY MEDICINE
Payer: COMMERCIAL

## 2023-08-10 ENCOUNTER — HOSPITAL ENCOUNTER (OUTPATIENT)
Dept: BONE DENSITY | Facility: CLINIC | Age: 78
Discharge: HOME | End: 2023-08-10
Attending: FAMILY MEDICINE
Payer: COMMERCIAL

## 2023-08-10 DIAGNOSIS — Z12.31 VISIT FOR SCREENING MAMMOGRAM: ICD-10-CM

## 2023-08-10 DIAGNOSIS — N39.46 MIXED STRESS AND URGE URINARY INCONTINENCE: ICD-10-CM

## 2023-08-10 DIAGNOSIS — Z78.0 ASYMPTOMATIC MENOPAUSAL STATE: ICD-10-CM

## 2023-08-10 PROCEDURE — 77067 SCR MAMMO BI INCL CAD: CPT

## 2023-08-10 PROCEDURE — 77080 DXA BONE DENSITY AXIAL: CPT

## 2023-08-11 DIAGNOSIS — K21.00 GASTROESOPHAGEAL REFLUX DISEASE WITH ESOPHAGITIS WITHOUT HEMORRHAGE: ICD-10-CM

## 2023-08-11 DIAGNOSIS — K22.710 BARRETT'S ESOPHAGUS WITH LOW GRADE DYSPLASIA: ICD-10-CM

## 2023-08-14 NOTE — TELEPHONE ENCOUNTER
Prescription approved per [facility] Refill Protocol.     Karishma Guy RN   [facility]

## 2023-08-15 DIAGNOSIS — N39.41 URGE INCONTINENCE: ICD-10-CM

## 2023-08-15 NOTE — TELEPHONE ENCOUNTER
mirabegron (MYRBETRIQ) 25 MG    Last Written Prescription Date:  2/13/23  Last Fill Quantity: 30,   # refills: 5  Last Office Visit : 3/24/23  Future Office visit:  3/29/24  Routing refill request to provider for review/approval because: Drug not on the refill protocol

## 2023-08-16 RX ORDER — MIRABEGRON 25 MG/1
25 TABLET, FILM COATED, EXTENDED RELEASE ORAL DAILY
Qty: 30 TABLET | Refills: 6 | Status: SHIPPED | OUTPATIENT
Start: 2023-08-16 | End: 2024-03-11

## 2023-08-16 NOTE — TELEPHONE ENCOUNTER
Reviewed last office visit notes from ken Juan to continue medication.    Prescription sent to pt's pharmacy.    Shandra Jacobs RN

## 2023-08-17 RX ORDER — TOLTERODINE 4 MG/1
4 CAPSULE, EXTENDED RELEASE ORAL DAILY
Qty: 30 CAPSULE | Refills: 6 | Status: SHIPPED | OUTPATIENT
Start: 2023-08-17 | End: 2024-05-15

## 2023-09-08 NOTE — RESULT ENCOUNTER NOTE
Hello!      Sorry for the delay in getting these result notes out to you...  I was also reviewing your chart while doing this, and it looks like you are due for an Medicare wellness and six month follow-up appointment this month.  I don't see one scheduled, which means it may be hard to schedule.  Please call and ask to talk to one of the schedulers or triage nurses at [facility], and ask them to find one of my virtual visit slots that they can turn into an office visit (try and shoot for early October?).        -Your bone scan (DXA scan) shows low bone density (also called osteopenia or pre-osteoporosis), at the level where we can consider using prescription medications (like fosamax).  We can talk about the treatment pros/cons and options at your upcoming visit.    In the meantime, I would maintain ~3-4 servings of calcium intake a day (cup of milk, ounce of cheese, cup of yogurt, leafy greens, or calcium supplementation), with the goal of getting ~1200mg of calcium daily.     Also make sure you are getting Vit D (Vit D3 1000 units/day via supplementation is usually the most efficient way due to limited [place] sun), and increase or continue weight bearing exercise.  Strength training has been found to be especially helpful for bone health.      Mitchell Ross MD

## 2023-10-08 DIAGNOSIS — I10 ESSENTIAL HYPERTENSION: ICD-10-CM

## 2023-10-08 DIAGNOSIS — A60.04 HERPES SIMPLEX VULVOVAGINITIS: ICD-10-CM

## 2023-10-08 DIAGNOSIS — E78.5 HYPERLIPIDEMIA LDL GOAL <130: ICD-10-CM

## 2023-10-10 RX ORDER — LOSARTAN POTASSIUM 25 MG/1
25 TABLET ORAL DAILY
Qty: 30 TABLET | Refills: 0 | Status: SHIPPED | OUTPATIENT
Start: 2023-10-10 | End: 2023-10-13

## 2023-10-10 RX ORDER — VALACYCLOVIR HYDROCHLORIDE 500 MG/1
TABLET, FILM COATED ORAL
Qty: 36 TABLET | Refills: 0 | OUTPATIENT
Start: 2023-10-10

## 2023-10-10 RX ORDER — HYDROCHLOROTHIAZIDE 25 MG/1
25 TABLET ORAL DAILY
Qty: 90 TABLET | Refills: 0 | Status: SHIPPED | OUTPATIENT
Start: 2023-10-10 | End: 2023-10-13

## 2023-10-10 RX ORDER — ROSUVASTATIN CALCIUM 20 MG/1
20 TABLET, COATED ORAL DAILY
Qty: 30 TABLET | Refills: 0 | Status: SHIPPED | OUTPATIENT
Start: 2023-10-10 | End: 2023-10-13

## 2023-10-10 NOTE — TELEPHONE ENCOUNTER
Routing refill request to provider for review/approval because:  Labs  Has visit 10.13  Mikki MILES RN

## 2023-10-11 NOTE — TELEPHONE ENCOUNTER
She may be running out- #30 sent to pharmacy so she can stay on meds to get to appt. Did not send in valcyclovir- can do that at her appt- takes prn.  Thanks- CW

## 2023-10-13 ENCOUNTER — TELEPHONE (OUTPATIENT)
Dept: OTHER | Facility: CLINIC | Age: 78
End: 2023-10-13

## 2023-10-13 ENCOUNTER — OFFICE VISIT (OUTPATIENT)
Dept: FAMILY MEDICINE | Facility: CLINIC | Age: 78
End: 2023-10-13
Payer: COMMERCIAL

## 2023-10-13 VITALS
RESPIRATION RATE: 16 BRPM | HEIGHT: 67 IN | WEIGHT: 270.9 LBS | SYSTOLIC BLOOD PRESSURE: 124 MMHG | DIASTOLIC BLOOD PRESSURE: 68 MMHG | HEART RATE: 95 BPM | TEMPERATURE: 97.7 F | BODY MASS INDEX: 42.52 KG/M2 | OXYGEN SATURATION: 93 %

## 2023-10-13 DIAGNOSIS — Z29.11 NEED FOR VACCINATION AGAINST RESPIRATORY SYNCYTIAL VIRUS: ICD-10-CM

## 2023-10-13 DIAGNOSIS — I10 ESSENTIAL HYPERTENSION: ICD-10-CM

## 2023-10-13 DIAGNOSIS — Z00.00 ENCOUNTER FOR MEDICARE ANNUAL WELLNESS EXAM: Primary | ICD-10-CM

## 2023-10-13 DIAGNOSIS — M85.80 OSTEOPENIA, UNSPECIFIED LOCATION: ICD-10-CM

## 2023-10-13 DIAGNOSIS — N39.46 MIXED STRESS AND URGE URINARY INCONTINENCE: ICD-10-CM

## 2023-10-13 DIAGNOSIS — K21.00 GASTROESOPHAGEAL REFLUX DISEASE WITH ESOPHAGITIS WITHOUT HEMORRHAGE: ICD-10-CM

## 2023-10-13 DIAGNOSIS — I87.2 VENOUS (PERIPHERAL) INSUFFICIENCY: ICD-10-CM

## 2023-10-13 DIAGNOSIS — Z12.11 SCREEN FOR COLON CANCER: ICD-10-CM

## 2023-10-13 DIAGNOSIS — K22.710 BARRETT'S ESOPHAGUS WITH LOW GRADE DYSPLASIA: ICD-10-CM

## 2023-10-13 DIAGNOSIS — A60.04 HERPES SIMPLEX VULVOVAGINITIS: ICD-10-CM

## 2023-10-13 DIAGNOSIS — Z74.09 DECREASED MOBILITY AND ENDURANCE: ICD-10-CM

## 2023-10-13 DIAGNOSIS — E78.5 HYPERLIPIDEMIA LDL GOAL <130: ICD-10-CM

## 2023-10-13 LAB
ALBUMIN SERPL BCG-MCNC: 3.9 G/DL (ref 3.5–5.2)
ALP SERPL-CCNC: 103 U/L (ref 35–104)
ALT SERPL W P-5'-P-CCNC: 27 U/L (ref 0–50)
ANION GAP SERPL CALCULATED.3IONS-SCNC: 12 MMOL/L (ref 7–15)
AST SERPL W P-5'-P-CCNC: 30 U/L (ref 0–45)
BILIRUB SERPL-MCNC: 0.4 MG/DL
BUN SERPL-MCNC: 18.5 MG/DL (ref 8–23)
CALCIUM SERPL-MCNC: 9.6 MG/DL (ref 8.8–10.2)
CHLORIDE SERPL-SCNC: 102 MMOL/L (ref 98–107)
CHOLEST SERPL-MCNC: 146 MG/DL
CREAT SERPL-MCNC: 0.74 MG/DL (ref 0.51–0.95)
CREAT UR-MCNC: 91.5 MG/DL
DEPRECATED HCO3 PLAS-SCNC: 26 MMOL/L (ref 22–29)
EGFRCR SERPLBLD CKD-EPI 2021: 82 ML/MIN/1.73M2
GLUCOSE SERPL-MCNC: 90 MG/DL (ref 70–99)
HDLC SERPL-MCNC: 56 MG/DL
LDLC SERPL CALC-MCNC: 63 MG/DL
MICROALBUMIN UR-MCNC: <12 MG/L
MICROALBUMIN/CREAT UR: NORMAL MG/G{CREAT}
NONHDLC SERPL-MCNC: 90 MG/DL
POTASSIUM SERPL-SCNC: 3.9 MMOL/L (ref 3.4–5.3)
PROT SERPL-MCNC: 7 G/DL (ref 6.4–8.3)
SODIUM SERPL-SCNC: 140 MMOL/L (ref 135–145)
TRIGL SERPL-MCNC: 136 MG/DL

## 2023-10-13 PROCEDURE — 99213 OFFICE O/P EST LOW 20 MIN: CPT | Mod: 25 | Performed by: FAMILY MEDICINE

## 2023-10-13 PROCEDURE — 82043 UR ALBUMIN QUANTITATIVE: CPT | Performed by: FAMILY MEDICINE

## 2023-10-13 PROCEDURE — 36415 COLL VENOUS BLD VENIPUNCTURE: CPT | Performed by: FAMILY MEDICINE

## 2023-10-13 PROCEDURE — 82570 ASSAY OF URINE CREATININE: CPT | Performed by: FAMILY MEDICINE

## 2023-10-13 PROCEDURE — G0439 PPPS, SUBSEQ VISIT: HCPCS | Performed by: FAMILY MEDICINE

## 2023-10-13 PROCEDURE — 80053 COMPREHEN METABOLIC PANEL: CPT | Performed by: FAMILY MEDICINE

## 2023-10-13 PROCEDURE — 80061 LIPID PANEL: CPT | Performed by: FAMILY MEDICINE

## 2023-10-13 RX ORDER — HYDROCHLOROTHIAZIDE 25 MG/1
25 TABLET ORAL DAILY
Qty: 90 TABLET | Refills: 1 | Status: SHIPPED | OUTPATIENT
Start: 2023-10-13 | End: 2024-04-12

## 2023-10-13 RX ORDER — VALACYCLOVIR HYDROCHLORIDE 500 MG/1
TABLET, FILM COATED ORAL
Qty: 36 TABLET | Refills: 1 | Status: SHIPPED | OUTPATIENT
Start: 2023-10-13 | End: 2024-04-22

## 2023-10-13 RX ORDER — ROSUVASTATIN CALCIUM 20 MG/1
20 TABLET, COATED ORAL DAILY
Qty: 90 TABLET | Refills: 3 | Status: SHIPPED | OUTPATIENT
Start: 2023-10-13

## 2023-10-13 RX ORDER — RESPIRATORY SYNCYTIAL VIRUS VACCINE 120MCG/0.5
0.5 KIT INTRAMUSCULAR ONCE
Qty: 1 EACH | Refills: 0 | Status: CANCELLED | OUTPATIENT
Start: 2023-10-13 | End: 2023-10-13

## 2023-10-13 RX ORDER — LOSARTAN POTASSIUM 25 MG/1
25 TABLET ORAL DAILY
Qty: 90 TABLET | Refills: 1 | Status: SHIPPED | OUTPATIENT
Start: 2023-10-13 | End: 2024-04-12

## 2023-10-13 ASSESSMENT — ENCOUNTER SYMPTOMS
CONSTIPATION: 0
CHILLS: 0
HEARTBURN: 0
FREQUENCY: 0
MYALGIAS: 1
COUGH: 0
DYSURIA: 0
NERVOUS/ANXIOUS: 1
DIZZINESS: 0
PALPITATIONS: 0
ABDOMINAL PAIN: 0
SORE THROAT: 0
ARTHRALGIAS: 1
FEVER: 0
EYE PAIN: 0
DIARRHEA: 0
JOINT SWELLING: 0
HEMATOCHEZIA: 0
PARESTHESIAS: 0
NAUSEA: 0
HEMATURIA: 0
WEAKNESS: 0
SHORTNESS OF BREATH: 1
BREAST MASS: 0
HEADACHES: 0

## 2023-10-13 ASSESSMENT — PAIN SCALES - GENERAL: PAINLEVEL: NO PAIN (0)

## 2023-10-13 ASSESSMENT — ACTIVITIES OF DAILY LIVING (ADL): CURRENT_FUNCTION: NO ASSISTANCE NEEDED

## 2023-10-13 NOTE — PATIENT INSTRUCTIONS
Patient Education   Personalized Prevention Plan  You are due for the preventive services outlined below.  Your care team is available to assist you in scheduling these services.  If you have already completed any of these items, please share that information with your care team to update in your medical record.  Health Maintenance Due   Topic Date Due     RSV VACCINE 60+ (1 - 1-dose 60+ series) Never done     Colorectal Cancer Screening  06/21/2023     ANNUAL REVIEW OF HM ORDERS  10/27/2023

## 2023-10-13 NOTE — TELEPHONE ENCOUNTER
Referral received via [employer] on 10/13/23.    Pt referred to [facility] by Marie Robledo MD for Venous (peripheral) insufficiency.    Pt needs to be scheduled for NEW VASCULAR PATIENT consult with vascular medicine.  Will route to scheduling to coordinate an appointment at next available.    Appt note:  Pt referred to [facility] by Marie Robledo MD for Venous (peripheral) insufficiency. Previous Dr. Duque patient.     Alba PHILIPPE, RN    [facility]  [facility]  Office: [phone]  Fax: [phone]

## 2023-10-13 NOTE — PROGRESS NOTES
"SUBJECTIVE:   Hiro Bloom is a 78 year old who presents for Preventive Visit.      10/13/2023    11:08 AM   Additional Questions   Roomed by WENDY Mcmahon       Are you in the first 12 months of your Medicare coverage?  No    Healthy Habits:     In general, how would you rate your overall health?  Good    Frequency of exercise:  None    Do you usually eat at least 4 servings of fruit and vegetables a day, include whole grains    & fiber and avoid regularly eating high fat or \"junk\" foods?  No    Taking medications regularly:  Yes    Medication side effects:  None    Ability to successfully perform activities of daily living:  No assistance needed    Home Safety:  Throw rugs in the hallway    Hearing Impairment:  Difficulty following a conversation in a noisy restaurant or crowded room    In the past 6 months, have you been bothered by leaking of urine? Yes    In general, how would you rate your overall mental or emotional health?  Good    Additional concerns today:  Yes      Wellness Visit Notes:  -Last mammo completed 8/10/2023, previously discontinued (impression: negative)  -Last DEXA completed 8/10/2023, due 8/2026 (impression: osteopenia) per chart review- will work on mobility/walking and recheck in 2-3 yrs, hold off on rx meds for now  -Last colon cancer screening completed 6/21/2018, due today (impression: ileum was normal, Tortuous colon, 1 polyp at splenic flexure - removed) per chart review - Pt agreeable  -- referred  -Immunizations: RSV/COVID/flu - Pt reports recently received flu and COVID and RSV  -Pt concerns: hearing - audiology referral pended  -----------------------------------------------------------------------------------------    Ever since around the time of the dxa/bone scan (8/10/23), she's noticed she's had more trouble with stairs.  Having trouble with the stairs- they have seven going up to the living room  Has to hang on banisters.  Can't carry anything unless tucked in a bag under her " arm.  Laundry is down stairs.  Also hard getting up from a chair without arms.  They have their name for [address], one level, but  resists going into the city.  He enjoys his shooting sports, life-giving for him.    He has a lot of health issues- ~2 wks ago, he had likely another heart attack, went to [facility].  She was there and called 911- he was fading.  He came through it okay.  He's more mobile than she, though.    They do have a treadmill.  Will try and do 5 minutes 2-3x/day.  Doesn't have an incline, but can try and increase the speed.      Dexa from 8/23- discussed borderline osteopenia- can consider meds/fosamax, but she will try walking/mobility things first, and recheck dexa in 2-3 yrs.  She's also increased her calcium (MVI and in her diet), Vit D and B12.      Not fasting today- but hard for her to get labs through [facility] otherwise- so will check non-fasting today.    Vascular- needs new referrral.    Urology- follow-up with Dr. Mcgarry, and current meds are helping some.        Have you ever done Advance Care Planning? (For example, a Health Directive, POLST, or a discussion with a medical provider or your loved ones about your wishes): Yes, advance care planning is on file.       Fall risk  Fallen 2 or more times in the past year?: No  Any fall with injury in the past year?: No  Cognitive Screening   1) Repeat 3 items (Leader, Season, Table)   2) Clock draw: NORMAL  3) 3 item recall: Recalls 3 objects  Results: 3 items recalled: COGNITIVE IMPAIRMENT LESS LIKELY    Mini-CogTM Copyright S [employer]. Licensed by the author for use in [facility]; reprinted with permission (triny@.[facility]). All rights reserved.      Do you have sleep apnea, excessive snoring or daytime drowsiness? : yes   Reviewed and updated as needed this visit by clinical staff   Tobacco  Allergies  Meds  Problems  Med Hx  Surg Hx  Fam Hx          Reviewed and updated as needed this visit by Provider   Tobacco  Allergies   Meds  Problems  Med Hx  Surg Hx  Fam Hx         Social History     Tobacco Use    Smoking status: Former     Packs/day: 1.00     Years: 20.00     Additional pack years: 0.00     Total pack years: 20.00     Types: Cigarettes     Quit date: 1985     Years since quittin.8    Smokeless tobacco: Never    Tobacco comments:     20 yrs smoking '65-85, 1 PPD   Substance Use Topics    Alcohol use: Yes     Alcohol/week: 0.0 standard drinks of alcohol     Comment: Occasional 2 beers weekly. 1-2 glasses wime monthly             10/13/2023    11:06 AM   Alcohol Use   Prescreen: >3 drinks/day or >7 drinks/week? No          No data to display              Do you have a current opioid prescription? No  Do you use any other controlled substances or medications that are not prescribed by a provider? None      Current providers sharing in care for this patient include:   Patient Care Team:  Marie Robledo MD as PCP - General  Dali Mcgarry MD as MD (Urology)  Edel Montenegro, RN as Specialty Care Coordinator (Urology)  Marie Robledo MD as Assigned PCP  Nohemy Ge PA-C as Physician Assistant (Urology)  Nohemy Ge PA-C as Assigned Surgical Provider    The following health maintenance items are reviewed in Epic and correct as of today:  Health Maintenance   Topic Date Due    COLORECTAL CANCER SCREENING  2023    ANNUAL REVIEW OF HM ORDERS  10/27/2023    MEDICARE ANNUAL WELLNESS VISIT  10/13/2024    FALL RISK ASSESSMENT  10/13/2024    EGD  2025    DEXA  08/10/2026    LIPID  2027    DTAP/TDAP/TD IMMUNIZATION (3 - Td or Tdap) 2028    ADVANCE CARE PLANNING  10/13/2028    HEPATITIS C SCREENING  Completed    PHQ-2 (once per calendar year)  Completed    INFLUENZA VACCINE  Completed    Pneumococcal Vaccine: 65+ Years  Completed    ZOSTER IMMUNIZATION  Completed    RSV VACCINE 60+  Completed    COVID-19 Vaccine  Completed    IPV IMMUNIZATION  Aged Out    HPV  "IMMUNIZATION  Aged Out    MENINGITIS IMMUNIZATION  Aged Out    MAMMO SCREENING  Discontinued         Lab work is in process  Labs reviewed in EPIC      Pertinent mammograms are reviewed under the imaging tab.    Review of Systems   Constitutional:  Negative for chills and fever.   HENT:  Positive for hearing loss. Negative for congestion, ear pain and sore throat.    Eyes:  Negative for pain and visual disturbance.   Respiratory:  Positive for shortness of breath. Negative for cough.    Cardiovascular:  Negative for chest pain, palpitations and peripheral edema.   Gastrointestinal:  Negative for abdominal pain, constipation, diarrhea, heartburn, hematochezia and nausea.   Breasts:  Negative for tenderness, breast mass and discharge.   Genitourinary:  Negative for dysuria, frequency, genital sores, hematuria, pelvic pain, urgency, vaginal bleeding and vaginal discharge.   Musculoskeletal:  Positive for arthralgias and myalgias. Negative for joint swelling.   Skin:  Negative for rash.   Neurological:  Negative for dizziness, weakness, headaches and paresthesias.   Psychiatric/Behavioral:  Negative for mood changes. The patient is nervous/anxious.        OBJECTIVE:   /68   Pulse 95   Temp 97.7  F (36.5  C) (Temporal)   Resp 16   Ht 1.702 m (5' 7\")   Wt 122.9 kg (270 lb 14.4 oz)   LMP  (LMP Unknown)   SpO2 93%   BMI 42.43 kg/m   Estimated body mass index is 42.43 kg/m  as calculated from the following:    Height as of this encounter: 1.702 m (5' 7\").    Weight as of this encounter: 122.9 kg (270 lb 14.4 oz).  Physical Exam  GENERAL APPEARANCE: healthy, alert and no distress  EYES: Eyes grossly normal to inspection, PERRL and conjunctivae and sclerae normal  HENT: ear canals and TM's normal, nose and mouth without ulcers or lesions, oropharynx clear and oral mucous membranes moist  NECK: no adenopathy, no asymmetry, masses, or scars and thyroid normal to palpation  RESP: lungs clear to auscultation - no " rales, rhonchi or wheezes  BREAST: normal without masses, tenderness or nipple discharge and no palpable axillary masses or adenopathy  CV: regular rate and rhythm, normal S1 S2, no S3 or S4, no murmur, click or rub, no peripheral edema and peripheral pulses strong  ABDOMEN: soft, nontender, no hepatosplenomegaly, no masses and bowel sounds normal  MS: no musculoskeletal defects are noted and gait is age appropriate without ataxia  SKIN: no suspicious lesions or rashes  NEURO: Normal strength and tone, sensory exam grossly normal, mentation intact and speech normal  PSYCH: mentation appears normal and affect normal/bright    Diagnostic Test Results:  Labs reviewed in Epic    ASSESSMENT / PLAN:       ICD-10-CM    1. Encounter for Medicare annual wellness exam  Z00.00 PRIMARY CARE FOLLOW-UP SCHEDULING     Adult Audiology  Referral      2. Decreased mobility and endurance  Z74.09       3. Osteopenia, unspecified location  M85.80       4. Essential hypertension  I10 Comprehensive metabolic panel (BMP + Alb, Alk Phos, ALT, AST, Total. Bili, TP)     Albumin Random Urine Quantitative with Creat Ratio     losartan (COZAAR) 25 MG tablet     hydrochlorothiazide (HYDRODIURIL) 25 MG tablet     Comprehensive metabolic panel (BMP + Alb, Alk Phos, ALT, AST, Total. Bili, TP)     Albumin Random Urine Quantitative with Creat Ratio      5. Hyperlipidemia LDL goal <130  E78.5 Lipid panel reflex to direct LDL Fasting     Comprehensive metabolic panel (BMP + Alb, Alk Phos, ALT, AST, Total. Bili, TP)     rosuvastatin (CRESTOR) 20 MG tablet     Lipid panel reflex to direct LDL Fasting     Comprehensive metabolic panel (BMP + Alb, Alk Phos, ALT, AST, Total. Bili, TP)      6. Herpes simplex vulvovaginitis  A60.04 valACYclovir (VALTREX) 500 MG tablet      7. Mixed stress and urge urinary incontinence  N39.46       8. Venous (peripheral) insufficiency  I87.2 Vascular Medicine Referral      9. Gastroesophageal reflux disease with  esophagitis without hemorrhage  K21.00 omeprazole (PRILOSEC) 20 MG DR capsule      10. Stallings's esophagus with low grade dysplasia  K22.710 omeprazole (PRILOSEC) 20 MG DR capsule      11. Need for vaccination against respiratory syncytial virus  Z29.11       12. Screen for colon cancer  Z12.11 Colonoscopy Screening  Referral            Wellness Visit RN Notes: MD updates in bold  -Last mammo completed 8/10/2023, previously discontinued (impression: negative)  -Last DEXA completed 8/10/2023, due 8/2026 (impression: osteopenia) per chart review- will work on mobility/walking and recheck in 2-3 yrs, hold off on rx meds for now  -Last colon cancer screening completed 6/21/2018, due today (impression: ileum was normal, Tortuous colon, 1 polyp at splenic flexure - removed) per chart review - Pt agreeable  -- referred  -Immunizations: RSV/COVID/flu - Pt reports recently received flu and COVID and RSV  -Pt concerns: hearing - audiology referral pended- order placed  -----------------------------------------------------------------------------------------    Immobility- having more pain/difficulty going up their seven stairs from lower level to upper level.  Making more trips for laundry, carrying less, cutting out what is not needed.  Also really hard to get out of chairs that do not have arm rests.  --They recently got a treadmill- will try and do 5 minutes 1-3x/day, and ramp up if/as able from there.  Once that is okay, work on doing more stairs, more often.  Once that is okay, work on sitting up and down from a chair.  Look into community ed classes for mobility for elderly.  Consider PT if able.    Dexa from 8/23- discussed borderline osteopenia- can consider meds/fosamax, but she will try walking/mobility things first, and recheck dexa in 2-3 yrs.  She's also increased her calcium (MVI and in her diet), Vit D and B12.    [facility]- valtrex refills sent.  PRN use working well.    Not fasting today- but hard for her  "to get labs through [facility] otherwise- so will check non-fasting today.    Vascular/LE vascular insufficiency- needs new referrral- placed.  Continues wearing compression stockings.    Urology/incontinence- follow-up with Dr. Mcgarry, and current meds are helping some.    GERD/Barretts- cont follow-up with GI.      Patient has been advised of split billing requirements and indicates understanding: Yes      COUNSELING:  Reviewed preventive health counseling, as reflected in patient instructions      BMI:   Estimated body mass index is 42.43 kg/m  as calculated from the following:    Height as of this encounter: 1.702 m (5' 7\").    Weight as of this encounter: 122.9 kg (270 lb 14.4 oz).   Weight management plan: Discussed healthy diet and exercise guidelines      She reports that she quit smoking about 38 years ago. Her smoking use included cigarettes. She has a 20.00 pack-year smoking history. She has never used smokeless tobacco.      Appropriate preventive services were discussed with this patient, including applicable screening as appropriate for fall prevention, nutrition, physical activity, Tobacco-use cessation, weight loss and cognition.  Checklist reviewing preventive services available has been given to the patient.    Reviewed patients plan of care and provided an AVS. The Basic Care Plan (routine screening as documented in Health Maintenance) for Hiro meets the Care Plan requirement. This Care Plan has been established and reviewed with the Patient.            Marie Robledo MD  [facility]    Identified Health Risks:  I have reviewed Opioid Use Disorder and Substance Use Disorder risk factors and made any needed referrals.   "

## 2023-10-16 NOTE — RESULT ENCOUNTER NOTE
-Your comprehensive metabolic panel (CMP, which includes electrolyte levels, blood sugar levels, and kidney and liver function tests) is normal.  -Your urine microalbumin level (which is the urine test that can signal signs of early chronic kidney disease if elevated to >30) is low which is good.  -Your cholesterol panel looks very good with a low LDL (the bad cholesterol) and a normal HDL (the good cholesterol).     Please let me know if you have any questions.  Best,   Mitchell Robledo MD

## 2023-11-03 ENCOUNTER — OFFICE VISIT (OUTPATIENT)
Dept: OTHER | Facility: CLINIC | Age: 78
End: 2023-11-03
Attending: INTERNAL MEDICINE
Payer: COMMERCIAL

## 2023-11-03 VITALS
WEIGHT: 275.6 LBS | DIASTOLIC BLOOD PRESSURE: 83 MMHG | HEART RATE: 85 BPM | BODY MASS INDEX: 43.26 KG/M2 | SYSTOLIC BLOOD PRESSURE: 124 MMHG | OXYGEN SATURATION: 95 % | HEIGHT: 67 IN

## 2023-11-03 DIAGNOSIS — I89.0 LYMPHEDEMA: Primary | ICD-10-CM

## 2023-11-03 DIAGNOSIS — I83.893 VARICOSE VEINS OF BILATERAL LOWER EXTREMITIES WITH OTHER COMPLICATIONS: ICD-10-CM

## 2023-11-03 DIAGNOSIS — I10 BENIGN ESSENTIAL HYPERTENSION: ICD-10-CM

## 2023-11-03 DIAGNOSIS — R60.9 LIPEDEMA: ICD-10-CM

## 2023-11-03 DIAGNOSIS — E78.5 HYPERLIPIDEMIA LDL GOAL <70: ICD-10-CM

## 2023-11-03 PROCEDURE — G0463 HOSPITAL OUTPT CLINIC VISIT: HCPCS | Performed by: INTERNAL MEDICINE

## 2023-11-03 PROCEDURE — 99205 OFFICE O/P NEW HI 60 MIN: CPT | Performed by: INTERNAL MEDICINE

## 2023-11-03 NOTE — PROGRESS NOTES
"Patient is here to discuss consult    /83 (BP Location: Left arm, Patient Position: Chair, Cuff Size: Adult Regular)   Pulse 85   Ht 5' 7\" (1.702 m)   Wt 275 lb 9.6 oz (125 kg)   LMP  (LMP Unknown)   SpO2 95%   BMI 43.17 kg/m      Questions patient would like addressed today are: N/A.    Refills are needed: No    Has homecare services and agency name:  Sally PASTRANA    "

## 2023-11-03 NOTE — PATIENT INSTRUCTIONS
Please see edema therapist for compression, MLD , wraps etc  , order placed they will call you     Take Vein formula 1000 one pill a day info given     Continue current medications     Follow up in clinic  6 months

## 2023-11-03 NOTE — PROGRESS NOTES
[facility] INITIAL VASCULAR MEDICINE CONSULT    ( New patient Visit)     PRIMARY HEALTH CARE PROVIDER:  Marie Robledo MD      REFERRING HEALTH CARE PROVIDER;  Marie Robledo MD      REASON FOR CONSULT: Evaluation and management of known history of bilateral lower extremity venous insufficiency in a very pleasant 78-year-old female with leg edema and also history of hypertension and hyperlipidemia      HPI: Hiro Carranza is a 78 year old year old very pleasant female looks younger than stated age obese with a BMI greater than 43 with known history of bilateral lower extremity varicose veins and venous insufficiency underwent venous competency studies in 2021 no DVT and using compression stockings there is a leg disparity right calf larger than left side and history of hematoma developed after bumping which is resolved using compression stockings with some improvement here for further evaluation and management.  She has hypertension well-controlled with current medications she has hyperlipidemia well-controlled with current medications.  She also gives a history of lower part of the body disproportionately larger than upper part of the body and also upper arms are larger than forearms which has been there for many years.  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    cards EKG overread- benign  unless pt develops palpitations       CURRENT MEDICATIONS  cephALEXin (KEFLEX) 500 MG capsule, as needed Taking before dental procedures/exams  Cholecalciferol (VITAMIN D3 PO), Take by mouth daily  clotrimazole (LOTRIMIN) 1 % external cream, Apply topically 2 times daily  COMPOUNDED NON-CONTROLLED SUBSTANCE (CMPD RX) - PHARMACY TO MIX COMPOUNDED MEDICATION, Estriol 1 mg/g Apply small amount to finger and apply to inside vagina daily for 2 weeks then twice weekly Route: vaginally  DAILY MULTIVITAMIN PO, DAILY  FISH  MG OR CAPS, 2 tablets daily  HEMP OIL OR EXTRACT OR OTHER CBD CANNABINOID, NOT MEDICAL CANNABIS,, Take 1 Piece by mouth daily Taking one per night before bedtime for sleep  hydrochlorothiazide (HYDRODIURIL) 25 MG tablet, Take 1 tablet (25 mg) by mouth daily  Ibuprofen (ADVIL PO),   losartan (COZAAR) 25 MG tablet, Take 1 tablet (25 mg) by mouth daily  mirabegron (MYRBETRIQ) 25 MG 24 hr tablet, TAKE ONE TABLET BY MOUTH ONCE DAILY  ofloxacin (OCUFLOX) 0.3 % ophthalmic solution,   omeprazole (PRILOSEC) 20 MG DR capsule, Take 1 capsule (20 mg) by mouth 2 times daily  polyethylene glycol 400 (BLINK TEARS) 0.25 % SOLN ophthalmic solution, 1 drop  rosuvastatin (CRESTOR) 20 MG tablet, Take 1 tablet (20 mg) by mouth daily  tolterodine ER (DETROL LA) 4 MG 24 hr capsule, Take 1 capsule (4 mg) by mouth daily  valACYclovir (VALTREX) 500 MG tablet, Take one tab daily x 3 days if outbreak    No current facility-administered medications on file prior to visit.      PAST SURGICAL HISTORY:  Past Surgical History:   Procedure Laterality Date    ARTHROPLASTY KNEE Right 6/10/2015    Procedure: ARTHROPLASTY KNEE;  Surgeon: Jorge Luis Snyder MD;  Location:  OR    ESOPHAGOSCOPY, GASTROSCOPY, DUODENOSCOPY (EGD), COMBINED N/A 5/21/2015    Procedure: COMBINED ESOPHAGOSCOPY, GASTROSCOPY, DUODENOSCOPY (EGD), BIOPSY SINGLE OR MULTIPLE;  Surgeon: Venkatesh Aguilera MD;  Location:  GI    ESOPHAGOSCOPY, GASTROSCOPY,  DUODENOSCOPY (EGD), COMBINED N/A 6/21/2018    Procedure: COMBINED ESOPHAGOSCOPY, GASTROSCOPY, DUODENOSCOPY (EGD), BIOPSY SINGLE OR MULTIPLE;  gastroscopy;  Surgeon: Venkatesh Aguilera MD;  Location:  GI    LAPAROSCOPIC CHOLECYSTECTOMY N/A 8/4/2016    Procedure: LAPAROSCOPIC CHOLECYSTECTOMY;  Surgeon: Venkatesh Ford MD;  Location:  OR    SURGICAL HISTORY OF -   2008    R cataract    [facility] NONSPECIFIC PROCEDURE  2004    ovarian cyst drainage, resection of fibroid tumors    Z NONSPECIFIC PROCEDURE  2005    L cataract    [facility] NONSPECIFIC PROCEDURE  6/08    L knee replacement    [facility] RAD RESEC TONSIL/PILLARS  1953    [facility] SHOULDER SURG PROC UNLISTED  2009    lt shoulder arthritis- replacement    Z TOTAL KNEE ARTHROPLASTY      left knee total 08       ALLERGIES     Allergies   Allergen Reactions    No Known Drug Allergy        FAMILY HISTORY  Family History   Problem Relation Age of Onset    C.A.D. Father         triple bypass in his late 60's    Circulatory Father     Lipids Father     Obesity Father     C.A.D. Maternal Grandfather         death from MI in early 60s    Obesity Maternal Grandfather     C.A.D. Paternal Grandfather         MI in early 70s    Obesity Paternal Grandfather     Hypertension Mother     Arthritis Mother     Lipids Mother     Musculoskeletal Disorder Mother         double knee replacement    Obesity Mother     Cerebrovascular Disease Maternal Grandmother     Obesity Maternal Grandmother     Obesity Paternal Grandmother        VASCULAR FAMILY HISTORY  1st order relative with atherosclerotic PAD: No  1st order relative with AAA: No  Family history of Familial Hyperlipidemia No  Family History of Hypercoagulable state:No    VASCULAR RISK FACTORS  1. Diabetes:No   2. Smoking: quit smoking some time ago.  3. 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 1-2 glasses wime monthly    Drug use: Yes     Types: Marijuana     Comment: occ cbd gummies    Sexual activity: Not Currently     Partners: Male   Other Topics Concern    Parent/sibling w/ CABG, MI or angioplasty before 65F 55M? No     Service No    Blood Transfusions No    Caffeine Concern No    Occupational Exposure No    Hobby Hazards No    Sleep Concern No    Stress Concern No    Weight Concern Yes    Special Diet No    Back Care No    Exercise No    Bike Helmet Not Asked     Comment: NA    Seat Belt Yes    Self-Exams Yes   Social History Narrative          Social Documentation:        Balanced Diet: NO- could be better- work on lean meats and fruits/vegies    Calcium intake: 1-2 and supplement per day    Caffeine: 2-6 per day    Exercise:  type of activity   0 times per week    Sunscreen: Yes    Seatbelts:  Yes    Self Breast Exam:  No    Self Testicular Exam: No - na    Physical/Emotional/Sexual Abuse: No     Do you feel safe in your environment? Yes        Cholesterol screen up to date: Yes - much improved on simvastatin    Lab Test   7/15/09   6/5/09                  0906      1020         CHOL       178       269*         HDL        63        67           LDL        100       186*         TRIG       74        82            CHOLHDLRA* 2.8       4.0           Eye Exam up to date: Yes    Dental Exam up to date: Yes    Pap smear up to date: Yes, '08                                                                                                          Mammogram up to date: Yes (12/09)    Dexa Scan up to date: Yes- 2006 (mild osteopenia)    Colonoscopy up to date: Yes- 2008    Immunizations up to date: Yes    Glucose screen if over 40:  Yes, do today     Social Determinants of Health     Financial Resource Strain: Low Risk  (10/13/2023)    Financial Resource Strain     Within the past 12 months, have you or your family members you live with been unable to get utilities (heat, electricity) when it was really needed?: No   Food Insecurity: Low Risk  (10/13/2023)    Food Insecurity     Within the past 12 months, did you worry that your food would run out before you got money to buy more?: No     Within the past 12 months, did the food you bought just not last and you didn t have money to get more?: No   Transportation Needs: Low Risk  (10/13/2023)    Transportation Needs     Within the past 12 months, has lack of transportation kept you from medical appointments, getting your medicines, non-medical meetings or appointments, work, or from getting things that you need?: No   Physical Activity: Not on file   Stress: Not on file   Social Connections: Not on file   Interpersonal Safety: Low Risk  (10/13/2023)    Interpersonal Safety     Do you feel physically and emotionally safe where you currently live?: Yes     Within the past 12 months, have you been hit, slapped, kicked or otherwise physically hurt by someone?: No     Within the past 12 months, have you been humiliated or emotionally abused in other ways by your partner or ex-partner?: No   Housing Stability: Low Risk  (10/13/2023)    Housing Stability     Do you have housing? : Yes     Are you worried about losing your housing?: No       ROS:   General: No change in weight, sleep or  "appetite.  Normal energy.  No fever or chills  Eyes: Negative for vision changes or eye problems  ENT: No problems with ears, nose or throat.  No difficulty swallowing.  Resp: No coughing, wheezing or shortness of breath  CV: No chest pains or palpitations  GI: No nausea, vomiting,  heartburn, abdominal pain, diarrhea, constipation or change in bowel habits  : No urinary frequency or dysuria, bladder or kidney problems  Musculoskeletal: No significant muscle or joint pains  Neurologic: No headaches, numbness, tingling, weakness, problems with balance or coordination  Psychiatric: No problems with anxiety, depression or mental health  Heme/immune/allergy: No history of bleeding or clotting problems or anemia.  No allergies or immune system problems  Endocrine: No history of thyroid disease, diabetes or other endocrine disorders  Skin: No rashes,worrisome lesions or skin problems  Vascular: No history of bilateral lower extremity varicose veins no previous intervention history of a injury related hematoma of the leg resolved, lower part of the body disproportionately larger than upper part of the body.  Legs feels heavy and of the day and tired and fatigue  EXAM:  /83 (BP Location: Left arm, Patient Position: Chair, Cuff Size: Adult Regular)   Pulse 85   Ht 5' 7\" (1.702 m)   Wt 275 lb 9.6 oz (125 kg)   LMP  (LMP Unknown)   SpO2 95%   BMI 43.17 kg/m    In general, the patient is a pleasant female in no apparent distress.    HEENT: NC/AT.  PERRLA.  EOMI.  Sclerae white, not injected.  Nares clear.  Pharynx without erythema or exudate.  Dentition intact.    Neck: No adenopathy.  No thyromegaly. Carotids +2/2 bilaterally without bruits.  No jugular venous distension.   Heart: RRR. Normal S1, S2 splits physiologically. No murmur, rub, click, or gallop. The PMI is in the 5th ICS in the midclavicular line. There is no heave.    Lungs: CTA.  No ronchi, wheezes, rales.  No dullness to percussion.   Abdomen: Soft, " nontender, nondistended. No organomegaly. No AAA.  No bruits.   Extremities: Vascular:  Bilateral lower extremity varicose veins with CEAP 4 CVI more on the left side  She has a classical features of lipedema with mattress phenomenon of buttocks and thighs, fat overhanging lateral aspect of the thighs and buttocks, fat overhanging medial aspect of the knees.  Swelling stops at ankles.  She also has a features of secondary lymphedema due to venous insufficiency and longstanding lymphedema etc..  Excellent palpable and dopplerable DP and PT pulses bilaterally      Labs:  LIPID RESULTS:  Lab Results   Component Value Date    CHOL 146 10/13/2023    CHOL 168 07/01/2020    HDL 56 10/13/2023    HDL 59 07/01/2020    LDL 63 10/13/2023    LDL 90 07/01/2020    TRIG 136 10/13/2023    TRIG 97 07/01/2020    CHOLHDLRATIO 2.6 12/03/2014       LIVER ENZYME RESULTS:  Lab Results   Component Value Date    AST 30 10/13/2023    AST 20 05/28/2021    ALT 27 10/13/2023    ALT 17 05/28/2021       CBC RESULTS:  Lab Results   Component Value Date    WBC 9.0 05/28/2021    RBC 4.13 05/28/2021    HGB 12.3 05/28/2021    HCT 38.6 05/28/2021    MCV 94 05/28/2021    MCH 29.8 05/28/2021    MCHC 31.9 05/28/2021    RDW 14.5 05/28/2021     05/28/2021       BMP RESULTS:  Lab Results   Component Value Date     10/13/2023     05/28/2021    POTASSIUM 3.9 10/13/2023    POTASSIUM 3.6 08/24/2022    POTASSIUM 3.5 05/28/2021    CHLORIDE 102 10/13/2023    CHLORIDE 105 08/24/2022    CHLORIDE 100 05/28/2021    CO2 26 10/13/2023    CO2 25 08/24/2022    CO2 30 05/28/2021    ANIONGAP 12 10/13/2023    ANIONGAP 9 08/24/2022    ANIONGAP 8 05/28/2021    GLC 90 10/13/2023    GLC 98 08/24/2022    GLC 78 05/28/2021    BUN 18.5 10/13/2023    BUN 16 08/24/2022    BUN 15 05/28/2021    CR 0.74 10/13/2023    CR 0.68 05/28/2021    GFRESTIMATED 82 10/13/2023    GFRESTIMATED 85 05/28/2021    GFRESTBLACK >90 05/28/2021    ELENA 9.6 10/13/2023    ELENA 9.4 05/28/2021           THYROID RESULTS:  Lab Results   Component Value Date    TSH 1.13 2017       Procedures:     R MARI US MARI DOPPLER WITH EXERCISE BILATERAL   2021 1:57 PM      HISTORY: with TBI; Open wound of right lower extremity, subsequent  encounter; PAD (peripheral artery disease) (H)     COMPARISON: None.     FINDINGS:  Right MARI:   DP: 0.80  PT: 0.78.     Left MARI:   DP: 0.80   PT: 0.83.     Right Digital brachial index: 0.60.  Left Digital Brachial index: 0.78     Waveforms: Multiphasic in the distal tibial arteries. Digital  waveforms are grossly unremarkable.     Exercise: The patient walked on a treadmill for 5 minutes at 1.5 miles  per hour and at a 10% incline. Patient had no symptoms with exercise..     Right exercise MARI: 0.95.  Left exercise MARI: 1.08                                                                      IMPRESSION: Resting ankle-brachial indices would indicate mild to  moderate arterial insufficiency. Interval improvement in ABIs  following exercise. Digital brachial indices would indicate  satisfactory wound healing potential.     MARI CRITERIA:  >0.95 Normal  0.90 - 0.94 Mild  0.5 - 0.89 Moderate  0.2 - 0.49 Severe  <0.2 Critical     DIGITAL BRACHIAL DIAGNOSTIC CRITERIA     > 0.7                                                     Normal  0.5-0.7                                                 Mild PAD  0.35-0.5                                               Moderate PAD  <0.35 & Toe pressure 40mmHG      Moderate to Severe PAD  <0.35 & Toe pressure <30mmHG    Severe PAD     AARON COLES MD     Name:  Hiro Carranza                                                Patient ID: [id number]  Date: 2021                                           : 1945  Sex: female                                                                 Examined by: ALFONSO Garcia RVT  Age:  76 year old                                                         Reading MD: ASHLEY Soni MD      INDICATION:  Patient is referred for bilateral lower extremity edema.     EXAM TYPE  BILATERAL LOWER EXTREMITY VENOUS DUPLEX FOR VENOUS INSUFFICIENCY  TECHNICAL SUMMARY     A duplex ultrasound study using color flow was performed, to evaluate the bilateral lower extremity veins for valvular incompetence with the patient in a steep reversed trendelenberg.      RIGHT:     The deep veins demonstrate phasic flow, compress and respond to augmentations.  There is no reflux or DVT.       The GSV demonstrates phasic flow, compresses and responds to augmentations from the saphenofemoral junction to the ankle with no evidence of thrombus. The great saphenous vein measures 6.8 mm at the saphenofemoral junction, 6.6 mm and 4.4 mm at the knee. The GSV is incompetent from Proximal Calf to Mid Calf, with the greatest reflux time of 1790 milliseconds.  The GSV gives rise to a varicose branch measuring 3.1 mm off the  Proximal Calf that courses Medial with a reflux time of 1567 milliseconds.      The AASV is incompetent ( 3.6 mm) draining into the saphenofemoral junction. The AASV is incompetent at the saphenofemoal junction with a reflux time of 693 milliseconds. The AASV takes a straight course for 18 cm then crosses laterally over the distal thigh. The AASV gives rise to a varicose branch measuring 3.6 mm off the Distal Thigh that courses Lateral with a reflux time of 4190 milliseconds.      The Giacomini vein is absent.     The SSV demonstrates phasic flow, compresses and responds to augmentations from the popliteal space to the ankle.  No thrombus is seen. The saphenopopliteal junction is absent. The SSV is incompetent at the Proximal Calf  with a reflux time of 1105 milliseconds.       Perforators: there is no evidence of incompetent  veins at any level.         LEFT:     The deep veins demonstrate phasic flow, compress and respond to augmentations.  There is no DVT.  The popliteal vein is incompetent and free of  thrombus. The remaining deep veins are competent and free of thrombus.      The GSV demonstrates phasic flow, compresses and responds to augmentations from the saphenofemoral junction to the ankle with no evidence of thrombus. The great saphenous vein measures 8.2 mm at the saphenofemoral junction, 6.0 mm at the proximal thigh and 3.3 mm at the knee. The GSV is incompetent from Proximal Calf to Mid Calf, with the greatest reflux time of 6186 milliseconds.   The GSV gives rise to multiple incompetent varicose veins, the largest measures 3.5 mm off the Mid Calf that courses Lateral with a reflux time of 2475 milliseconds.         The AASV is competent ( 3.2 mm) draining into the saphenofemoral junction.      The Giacomini vein is absent.     The SSV demonstrates phasic flow, compresses and responds to augmentations from the popliteal space to the ankle.  No reflux or thrombus is seen. The saphenopopliteal junction is competent ( 2.4 mm). The saphenopopliteal junction is absent.      Perforators: there is no evidence of incompetent  veins at any level.         FINAL SUMMARY:  1.         No evidence of bilateral lower extremity deep vein thrombus.  2.         Left popliteal vein incompetence.  3.         Right great saphenous vein incompetence.  4.         Right anterior accessory vein incompetence.   5.         Right small saphenous vein incompetence.  6.         Right varicose vein incompetence.  7.         Left great saphenous vein incompetence.  8.         Left varicose vein incompetence.   9.         The time of incompetence is greater than 500 milliseconds in perforators and superficial veins and greater than 1000 milliseconds in deep veins.        Assessment and Plan:     1. Lymphedema , secondary to venous insufficiency  - Lymphedema Therapy Referral; Future  - Compression Sleeve/Stocking Order for DME - ONLY FOR DME    2. Varicose veins of bilateral lower extremities with other complications with CEAP  4 CVI    3. Lipedema    4. Benign essential hypertension we reviewed    5. Hyperlipidemia LDL goal <70      This is a very pleasant 78-year-old female looks much younger than stated age dealing with bilateral lower extremity swelling, leg heaviness, fullness she has a classical features of lipedema and secondary lymphedema due to venous insufficiency.  She has been using compression stockings and having issues to wear them.  Blood pressure well controlled lipids well controlled with current medications.  No clinical evidence of arterial insufficiency previous MARI unremarkable  Reviewed previous venous competency studies no history of DVT but she does have GSV incompetence bilaterally.  No previous intervention for varicose veins.    We have reviewed options of treatment and she has never seen edema therapist  She is also obese with a BMI greater than 43.    Discussed with the patient compression, MLD, wraps, elevation along with the weight loss and anti-inflammatory diet, paleo diet will improve the symptoms and maybe eventually pump therapy etc.    At present my recommendations,  Arrange referral to see the edema therapist  Utilize compression stockings thigh-high during the daytime and elevate the legs prescription given and she will wait until she sees edema therapist for customized version with Velcro wraps etc.  Take vein formula 1000,  one pill a day prescription and information given  Continue current blood pressure medications and lipid medications  Follow-up in the clinic in 6 months     Thank you for the consultation !  This note was dictated by utilizing Dragon software  Copy of this note to primary care physician  AVS with written instructions given      60 minutes spent on the date of the encounter doing chart review, history and exam, documentation, and further activities as noted above.      Williams Santos MD,FAHA,FSVM,FNLA, FACP  Vascular Medicine  Clinical Hypertension Specialist   Clinical  Lipidologist

## 2023-11-06 ENCOUNTER — TRANSFERRED RECORDS (OUTPATIENT)
Dept: MULTI SPECIALTY CLINIC | Facility: CLINIC | Age: 78
End: 2023-11-06

## 2023-11-14 ENCOUNTER — THERAPY VISIT (OUTPATIENT)
Dept: OCCUPATIONAL THERAPY | Facility: CLINIC | Age: 78
End: 2023-11-14
Attending: FAMILY MEDICINE
Payer: COMMERCIAL

## 2023-11-14 DIAGNOSIS — I89.0 LYMPHEDEMA: Primary | ICD-10-CM

## 2023-11-14 PROCEDURE — 97140 MANUAL THERAPY 1/> REGIONS: CPT | Mod: GO

## 2023-11-14 PROCEDURE — 97165 OT EVAL LOW COMPLEX 30 MIN: CPT | Mod: GO
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
## 2023-11-20 ENCOUNTER — THERAPY VISIT (OUTPATIENT)
Dept: OCCUPATIONAL THERAPY | Facility: CLINIC | Age: 78
End: 2023-11-20
Attending: INTERNAL MEDICINE
Payer: COMMERCIAL

## 2023-11-20 DIAGNOSIS — I89.0 LYMPHEDEMA: Primary | ICD-10-CM

## 2023-11-20 PROCEDURE — 97140 MANUAL THERAPY 1/> REGIONS: CPT | Mod: GO

## 2023-12-01 ENCOUNTER — THERAPY VISIT (OUTPATIENT)
Dept: OCCUPATIONAL THERAPY | Facility: CLINIC | Age: 78
End: 2023-12-01
Attending: INTERNAL MEDICINE
Payer: COMMERCIAL

## 2023-12-01 DIAGNOSIS — I89.0 LYMPHEDEMA: Primary | ICD-10-CM

## 2023-12-01 PROCEDURE — 97140 MANUAL THERAPY 1/> REGIONS: CPT | Mod: GO

## 2023-12-07 ENCOUNTER — THERAPY VISIT (OUTPATIENT)
Dept: OCCUPATIONAL THERAPY | Facility: CLINIC | Age: 78
End: 2023-12-07
Attending: INTERNAL MEDICINE
Payer: COMMERCIAL

## 2023-12-07 DIAGNOSIS — I89.0 LYMPHEDEMA: Primary | ICD-10-CM

## 2023-12-07 PROCEDURE — 97140 MANUAL THERAPY 1/> REGIONS: CPT | Mod: GO

## 2023-12-14 ENCOUNTER — THERAPY VISIT (OUTPATIENT)
Dept: OCCUPATIONAL THERAPY | Facility: CLINIC | Age: 78
End: 2023-12-14
Attending: INTERNAL MEDICINE
Payer: COMMERCIAL

## 2023-12-14 DIAGNOSIS — I89.0 LYMPHEDEMA: Primary | ICD-10-CM

## 2023-12-14 PROCEDURE — 97140 MANUAL THERAPY 1/> REGIONS: CPT | Mod: GO

## 2023-12-17 ENCOUNTER — NURSE TRIAGE (OUTPATIENT)
Dept: FAMILY MEDICINE | Facility: CLINIC | Age: 78
End: 2023-12-17
Payer: COMMERCIAL

## 2023-12-17 DIAGNOSIS — U07.1 INFECTION DUE TO 2019 NOVEL CORONAVIRUS: Primary | ICD-10-CM

## 2023-12-17 NOTE — TELEPHONE ENCOUNTER
COVID Positive/Requesting COVID treatment    Patient is positive for COVID and requesting treatment options.    Date of positive COVID test (PCR or at home)? 12/17/23  Current COVID symptoms: cough and fatigue  Date COVID symptoms began: 12/14/23    Message should be routed to clinic RN pool. Best phone number to use for call back: [phone]

## 2023-12-18 NOTE — TELEPHONE ENCOUNTER
RN COVID TREATMENT VISIT  12/18/23      The patient has been triaged and does not require a higher level of care.    Hiro Carranza  78 year old  Current weight? 275 lbs    Has the patient been seen by a primary care provider at an [facility] or [facility] within the past two years? Yes.   Have you been in close proximity to/do you have a known exposure to a person with a confirmed case of influenza? No.     General treatment eligibility:  Date of positive COVID test (PCR or at home)?  12/17/2023    Are you or have you been hospitalized for this COVID-19 infection? No.   Have you received monoclonal antibodies or antiviral treatment for COVID-19 since this positive test? No.   Do you have any of the following conditions that place you at risk of being very sick from COVID-19?   - Age 50 years or older  Yes, patient has at least one high risk condition as noted above.     Current COVID symptoms:   - cough  - fatigue  Yes. Patient has at least one symptom as selected.     How many days since symptoms started? 5 days or less. Established patient, 12 years or older weighing at least 88.2 lbs, who has symptoms that started in the past 5 days, has not been hospitalized nor received treatment already, and is at risk for being very sick from COVID-19.     Treatment eligibility by RN:  Are you currently pregnant or nursing? No  Do you have a clinically significant hypersensitivity to nirmatrelvir or ritonavir, or toxic epidermal necrolysis (TEN) or Tobias-King Syndrome? No  Do you have a history of hepatitis, any hepatic impairment on the Problem List (such as Child-Ch Class C, cirrhosis, fatty liver disease, alcoholic liver disease), or was the last liver lab (hepatic panel, ALT, AST, ALK Phos, bilirubin) elevated in the past 6 months? No  Do you have any history of severe renal impairment (eGFR < 30mL/min)? No    Is patient eligible to continue? Yes, patient meets all eligibility requirements  for the RN COVID treatment (as denoted by all no responses above).     Current Outpatient Medications   Medication Sig Dispense Refill    cephALEXin (KEFLEX) 500 MG capsule as needed Taking before dental procedures/exams      Cholecalciferol (VITAMIN D3 PO) Take by mouth daily      clotrimazole (LOTRIMIN) 1 % external cream Apply topically 2 times daily 113 g 1    COMPOUNDED NON-CONTROLLED SUBSTANCE (CMPD RX) - PHARMACY TO MIX COMPOUNDED MEDICATION Estriol 1 mg/g Apply small amount to finger and apply to inside vagina daily for 2 weeks then twice weekly Route: vaginally 30 g 11    DAILY MULTIVITAMIN PO DAILY      FISH  MG OR CAPS 2 tablets daily      HEMP OIL OR EXTRACT OR OTHER CBD CANNABINOID, NOT MEDICAL CANNABIS, Take 1 Piece by mouth daily Taking one per night before bedtime for sleep      hydrochlorothiazide (HYDRODIURIL) 25 MG tablet Take 1 tablet (25 mg) by mouth daily 90 tablet 1    Ibuprofen (ADVIL PO)       losartan (COZAAR) 25 MG tablet Take 1 tablet (25 mg) by mouth daily 90 tablet 1    mirabegron (MYRBETRIQ) 25 MG 24 hr tablet TAKE ONE TABLET BY MOUTH ONCE DAILY 30 tablet 6    ofloxacin (OCUFLOX) 0.3 % ophthalmic solution       omeprazole (PRILOSEC) 20 MG DR capsule Take 1 capsule (20 mg) by mouth 2 times daily 180 capsule 1    polyethylene glycol 400 (BLINK TEARS) 0.25 % SOLN ophthalmic solution 1 drop      rosuvastatin (CRESTOR) 20 MG tablet Take 1 tablet (20 mg) by mouth daily 90 tablet 3    tolterodine ER (DETROL LA) 4 MG 24 hr capsule Take 1 capsule (4 mg) by mouth daily 30 capsule 6    valACYclovir (VALTREX) 500 MG tablet Take one tab daily x 3 days if outbreak 36 tablet 1       Medications from List 1 of the standing order (on medications that exclude the use of Paxlovid) that patient is taking: NONE. Is patient taking Joshua's Wort? No  Is patient taking [place]'s Wort or any meds from List 1? No.   Medications from List 2 of the standing order (on meds that provider needs to adjust)  that patient is taking: NONE. Is patient on any of the meds from List 2? No.   Medications from List 3 of standing order (on meds that a RN needs to adjust) that patient is taking: rosuvastatin (Crestor): Instructed patient to stop rosuvastatin while taking Paxlovid and restart rosuvastatin 1 day after the completion of Paxlovid.  Is patient on any meds from List 3? Yes. Patient is on meds from list 3. No meds require a provider visit and at least one med required RN to adjust.     Paxlovid has an approximate 90% reduction in hospitalization. Paxlovid can possibly cause altered sense of taste, diarrhea (loose, watery stools), high blood pressure, muscle aches.     Would patient like a Paxlovid prescription?   Yes.   Lab Results   Component Value Date    GFRESTIMATED 82 10/13/2023       Was last eGFR reduced? No, eGFR 60 or greater/ No Result on record. Patient can receive the normal renal function dose. Paxlovid Rx sent to [facility]   Cub    Temporary change to home medications: See above    All medication adjustments (holds, etc) were discussed with the patient and patient was asked to repeat back (teachback) their med adjustment.  Did patient understand med adjustment? Yes, patient repeated back and understood correctly.        Reviewed the following instructions with the patient:    Paxlovid (nimatrelvir and ritonavir)    How it works  Two medicines (nirmatrelvir and ritonavir) are taken together. They stop the virus from growing. Less amount of virus is easier for your body to fight.    How to take  Medicine comes in a daily container with both medicine tablets. Take by mouth twice daily (once in the morning, once at night) for 5 days.  The number of tablets to take varies by patient.  Don't chew or break capsules. Swallow whole.    When to take  Take as soon as possible after positive COVID-19 test result, and within 5 days of your first symptoms.    Possible side effects  Can cause altered sense of  taste, diarrhea (loose, watery stools), high blood pressure, muscle aches.    Bob Kaba RN       Reason for Disposition   HIGH RISK patient (e.g., weak immune system, age > 64 years, obesity with BMI of 30 or higher, pregnant, chronic lung disease or other chronic medical condition) and COVID symptoms (e.g., cough, fever)  (Exceptions: Already seen by doctor or NP/PA and no new or worsening symptoms.)    Additional Information   Negative: SEVERE difficulty breathing (e.g., struggling for each breath, speaks in single words)   Negative: Difficult to awaken or acting confused (e.g., disoriented, slurred speech)   Negative: Bluish (or gray) lips or face now   Negative: Shock suspected (e.g., cold/pale/clammy skin, too weak to stand, low BP, rapid pulse)   Negative: Sounds like a life-threatening emergency to the triager   Negative: Diagnosed or suspected COVID-19 and symptoms lasting 3 or more weeks   Negative: COVID-19 exposure and no symptoms   Negative: COVID-19 vaccine reaction suspected (e.g., fever, headache, muscle aches) occurring 1 to 3 days after getting vaccine   Negative: COVID-19 vaccine, questions about   Negative: Lives with someone known to have influenza (flu test positive) and flu-like symptoms (e.g., cough, runny nose, sore throat, SOB; with or without fever)   Negative: Possible COVID-19 symptoms and triager concerned about severity of symptoms or other causes   Negative: COVID-19 and breastfeeding, questions about   Negative: SEVERE or constant chest pain or pressure  (Exception: Mild central chest pain, present only when coughing.)   Negative: MODERATE difficulty breathing (e.g., speaks in phrases, SOB even at rest, pulse 100-120)   Negative: Headache and stiff neck (can't touch chin to chest)   Negative: Oxygen level (e.g., pulse oximetry) 90% or lower   Negative: Chest pain or pressure  (Exception: MILD central chest pain, present only when coughing.)   Negative: Drinking very little and  dehydration suspected (e.g., no urine > 12 hours, very dry mouth, very lightheaded)   Negative: Patient sounds very sick or weak to the triager   Negative: MILD difficulty breathing (e.g., minimal/no SOB at rest, SOB with walking, pulse <100)   Negative: Fever > 103 F (39.4 C)   Negative: Fever > 101 F (38.3 C) and over 60 years of age   Negative: Fever > 100.0 F (37.8 C) and bedridden (e.g., CVA, chronic illness, recovering from surgery)    Protocols used: Coronavirus (COVID-19) Diagnosed or [id number]-H-RS

## 2023-12-27 ENCOUNTER — THERAPY VISIT (OUTPATIENT)
Dept: OCCUPATIONAL THERAPY | Facility: CLINIC | Age: 78
End: 2023-12-27
Attending: INTERNAL MEDICINE
Payer: COMMERCIAL

## 2023-12-27 DIAGNOSIS — I89.0 LYMPHEDEMA: Primary | ICD-10-CM

## 2023-12-27 PROCEDURE — 97140 MANUAL THERAPY 1/> REGIONS: CPT | Mod: GO

## 2024-01-04 ENCOUNTER — THERAPY VISIT (OUTPATIENT)
Dept: OCCUPATIONAL THERAPY | Facility: CLINIC | Age: 79
End: 2024-01-04
Attending: INTERNAL MEDICINE
Payer: COMMERCIAL

## 2024-01-04 DIAGNOSIS — I89.0 LYMPHEDEMA: Primary | ICD-10-CM

## 2024-01-04 PROCEDURE — 97140 MANUAL THERAPY 1/> REGIONS: CPT | Mod: GO

## 2024-01-06 ENCOUNTER — THERAPY VISIT (OUTPATIENT)
Dept: OCCUPATIONAL THERAPY | Facility: CLINIC | Age: 79
End: 2024-01-06
Attending: INTERNAL MEDICINE
Payer: COMMERCIAL

## 2024-01-06 DIAGNOSIS — I89.0 LYMPHEDEMA: Primary | ICD-10-CM

## 2024-01-06 PROCEDURE — 97140 MANUAL THERAPY 1/> REGIONS: CPT | Mod: GO

## 2024-01-08 ENCOUNTER — OFFICE VISIT (OUTPATIENT)
Dept: AUDIOLOGY | Facility: CLINIC | Age: 79
End: 2024-01-08
Attending: FAMILY MEDICINE
Payer: COMMERCIAL

## 2024-01-08 DIAGNOSIS — Z00.00 ENCOUNTER FOR MEDICARE ANNUAL WELLNESS EXAM: ICD-10-CM

## 2024-01-08 DIAGNOSIS — H90.3 SNHL (SENSORY-NEURAL HEARING LOSS), ASYMMETRICAL: Primary | ICD-10-CM

## 2024-01-08 PROCEDURE — 92550 TYMPANOMETRY & REFLEX THRESH: CPT | Performed by: AUDIOLOGIST

## 2024-01-08 PROCEDURE — 92557 COMPREHENSIVE HEARING TEST: CPT | Performed by: AUDIOLOGIST

## 2024-01-08 NOTE — PROGRESS NOTES
AUDIOLOGY REPORT    SUBJECTIVE:  Hiro Carranza is a 78 year old female who was seen in the Audiology Clinic at the [facility] for audiologic evaluation, referred by Marie Robledo M.D.  The patient reports a longstanding asymmetrical hearing loss with worse hearing in the left ear for which she has worn hearing aids in the past. The patient denies otalgia, aural fullness, otorrhea, tinnitus, and dizziness.  The patient notes difficulty with communication in a variety of listening situations.  They were accompanied today by their .    OBJECTIVE:  Abuse Screening:  Do you feel unsafe at home or work/school? No  Do you feel threatened by someone? No  Does anyone try to keep you from having contact with others, or doing things outside of your home? No  Physical signs of abuse present? No     Fall Risk Screen:  1. Have you fallen two or more times in the past year? No  2. Have you fallen and had an injury in the past year? No    Otoscopic exam indicates ears are clear of cerumen bilaterally     Pure Tone Thresholds assessed using conventional audiometry with good  reliability from 250-8000 Hz bilaterally using insert and circumaural earphones     RIGHT:  mild sloping to moderate sensorineural hearing loss    LEFT:    mild sloping to severe sensorineural hearing loss    Tympanogram:    RIGHT: normal eardrum mobility    LEFT:   normal eardrum mobility    Reflexes (reported by stimulus ear):  RIGHT: Ipsilateral is present at normal levels  RIGHT: Contralateral is absent at frequencies tested  LEFT:   Ipsilateral is absent at frequencies tested  LEFT:   Contralateral is present at normal levels      Speech Reception Threshold:    RIGHT: 35 dB HL    LEFT:   50 dB HL  Word Recognition Score:     RIGHT: 100% at 80 dB HL using NU-6 recorded word list.    LEFT:   92% at 85 dB HL using NU-6 recorded word list.      ASSESSMENT:     ICD-10-CM    1. SNHL (sensory-neural hearing loss),  asymmetrical  H90.3 Cmpn Audiometry Thrshld Eval & Speech Recog (17978)     Tymps / Reflex   (59230)      2. Encounter for Medicare annual wellness exam  Z00.00 Adult Audiology  Referral          Today s results were discussed with the patient in detail.     PLAN:  Patient was counseled regarding hearing loss and impact on communication.  Patient is a good candidate for amplification at this time pending medical clearance. It is recommended that the patient be evaluated by ENT.  Please call this clinic with questions regarding these results or recommendations.        Roland Nixon.  Doctor of Audiology  [place] License # [id number]

## 2024-01-23 ENCOUNTER — THERAPY VISIT (OUTPATIENT)
Dept: OCCUPATIONAL THERAPY | Facility: CLINIC | Age: 79
End: 2024-01-23
Attending: INTERNAL MEDICINE
Payer: COMMERCIAL

## 2024-01-23 DIAGNOSIS — I89.0 LYMPHEDEMA: Primary | ICD-10-CM

## 2024-01-23 PROCEDURE — 97140 MANUAL THERAPY 1/> REGIONS: CPT | Mod: GO

## 2024-02-08 ENCOUNTER — DOCUMENTATION ONLY (OUTPATIENT)
Dept: OTHER | Facility: CLINIC | Age: 79
End: 2024-02-08
Payer: COMMERCIAL

## 2024-02-08 NOTE — PROGRESS NOTES
Rx for Pneumatic Compression Device Flexitouch System from [employer] signed by Dr. Santos, faxed to [phone].    [employer] 02/08/24 8:11.    Sent to HIM.

## 2024-02-09 NOTE — CONFIDENTIAL NOTE
Patient Name: Hiro Carranza  : 1945    DIAGNOSIS:  [x] Secondary Lymphedema [I89.0] due to chronic venous insufficiency, lipedema   [] Hereditary lymphedema [Q82.0]   [] Postmastectomy lymphedema [I97.2]    [] Chronic Venous Stasis ulcers [I87.2]     The lymphedema extends from the tips of the toes to the truncal region    SYMPTOMS:  Patient exhibits the following symptoms despite conservative therapy:  [] Hyperkeratosis   [x] Hyperplasia   [x] Hyperpigmentation   [] Skin breakdown with lymphorrhea (skin weeping)   [] Papillomatosis    [] Elephantiasis  [x] Progressive edema  [x] Truncal/abdominal lymphedema (tightness, fullness, heaviness, swelling, etc.)  [] Chest/axillary swelling  [] Genital swelling  [x] Unable to control swelling  [x] Impaired ROM  [x] Impaired mobility  [x] Pain  [] Scarring    CONSERVATIVE TREATMENT:  Patient has tried conservative treatments:  [x] Yes [] No  If YES, specify type of conservative treatment and duration of use:  Compression garments:    [] <4 weeks   [x] 1-5 months   [] 6-12 months  [] >1 year  Bandaging:     [] <4 weeks   [x] 1-5 months   [] 6-12 months  [] >1 year  Elevation:       [] <4 weeks   [x] 1-5 months   [] 6-12 months  [] >1 year   Exercise:         [] <4 weeks   [] 1-5 months   [] 6-12 months  [x] >1 year   Complete Decongestive Therapy (CDT) [] <4 weeks   [x] 1-5 months   [] 6-12 months  [] >1 year   Instructed on self-manual lymphatic drainage techniques (self-MLD): [x] Yes [] No  Instructed on appropriate skin/nail care practices:    [x] Yes [] No      MEASUREMENTS:  Lower extremity measurements (measure the largest circumferential point of the listed area):   Date:  24 Before using Entre basic pump  Date:  24 Post use of Entre basic pump   [x]  Right Leg  Thigh Measurements  67 cm  [x]   Right Leg  Thigh Measurements  65 cm   Knee 52 cm  Knee 53 cm   Calf 50 cm  Calf 50.7 cm   Ankle 30 cm  Ankle 29.5 cm   Hips 141 cm  Hips  142.4   Patient failed  one-time Entre basic pump trial as it pushed lymphatic fluid from the patient's right leg, creating more swelling in the hips/trunk.  As pt already has lymphedema in her abdomen, the basic pump would not be appropriate for lymphedema management and could exacerbate systems. I am recommending pt pursue the Flexitouch advanced pump as it decongests the abdomen as well as LEs.     Physician/Therapist Signature Vi TARANGO/ALFONSO, CLT-CAREY    Date 2/9/24

## 2024-03-11 DIAGNOSIS — N39.46 MIXED STRESS AND URGE URINARY INCONTINENCE: ICD-10-CM

## 2024-03-11 RX ORDER — MIRABEGRON 25 MG/1
25 TABLET, FILM COATED, EXTENDED RELEASE ORAL DAILY
Qty: 30 TABLET | Refills: 0 | Status: SHIPPED | OUTPATIENT
Start: 2024-03-11 | End: 2024-05-15

## 2024-03-11 NOTE — TELEPHONE ENCOUNTER
Patient is scheduled for her upcoming appointment with Nohemy Ge PA-C. Medication pended for RN to sign.     Nova Wang LPN on 3/11/2024 at 10:32 AM

## 2024-03-11 NOTE — TELEPHONE ENCOUNTER
Medication request: mirabegron (MYRBETRIQ) 25 MG 24 hr tablet   Sig: TAKE ONE TABLET BY MOUTH ONCE DAILY   Prescription written: 8/16/23  Last Qty: 30  Pt's last office visit: 3/24/23  Next scheduled office visit: 4/5/24    Refill approved per refill protocol. Prescription sent to pharmacy.  Pharmacy will contact patient when ready for .    Renetta Barakat RN, BSN

## 2024-03-11 NOTE — TELEPHONE ENCOUNTER
[facility] Call Center    Phone Message    May a detailed message be left on voicemail: yes     Reason for Call: Medication Refill Request    Has the patient contacted the pharmacy for the refill? Yes   Name of medication being requested: mirabegron (MYRBETRIQ) 25 MG 24 hr tablet [[id number]] (Order [id number])   Provider who prescribed the medication: Joo  Pharmacy: [facility]  Date medication is needed: asap, pt is out   Action Taken: Other: urology    Travel Screening: Not Applicable

## 2024-03-15 ENCOUNTER — TELEPHONE (OUTPATIENT)
Dept: OTHER | Facility: CLINIC | Age: 79
End: 2024-03-15
Payer: COMMERCIAL

## 2024-03-15 NOTE — TELEPHONE ENCOUNTER
[facility]    Who is the name of the provider?:  JUSTA DEL ROSARIO   What is the location you see this provider at/preferred location?: [place]  Person calling / Facility: Hiro Carranza  Phone number:  [phone] (home)  Nurse call back needed:  Yes or scheduling can return the call    Reason for call:  Patient called asking for an updated discount code for Vein Formula.    Pharmacy location: n/a  Outside Imaging: n/a   Can we leave a detailed message on this number?  YES     3/15/2024, 3:15 PM
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
## 2024-04-05 ENCOUNTER — OFFICE VISIT (OUTPATIENT)
Dept: UROLOGY | Facility: CLINIC | Age: 79
End: 2024-04-05
Payer: COMMERCIAL

## 2024-04-05 DIAGNOSIS — N39.41 URGE INCONTINENCE: Primary | ICD-10-CM

## 2024-04-05 PROCEDURE — 99213 OFFICE O/P EST LOW 20 MIN: CPT | Performed by: PHYSICIAN ASSISTANT

## 2024-04-05 RX ORDER — TOLTERODINE 4 MG/1
4 CAPSULE, EXTENDED RELEASE ORAL DAILY
Qty: 90 CAPSULE | Refills: 3 | Status: SHIPPED | OUTPATIENT
Start: 2024-04-05

## 2024-04-05 RX ORDER — MIRABEGRON 25 MG/1
25 TABLET, FILM COATED, EXTENDED RELEASE ORAL DAILY
Qty: 90 TABLET | Refills: 3 | Status: SHIPPED | OUTPATIENT
Start: 2024-04-05

## 2024-04-05 NOTE — PATIENT INSTRUCTIONS
UROLOGY CLINIC VISIT PATIENT INSTRUCTIONS    Follow up with Nohemy Ge PA-C in 1 year.     Continue Myrbetriq and Detrol which were both refilled today.     If you have any issues, questions or concerns in the meantime, do not hesitate to contact us at [phone] or via [employer].     It was a pleasure meeting with you today.  Thank you for allowing me and my team the privilege of caring for you today.  YOU are the reason we are here, and I truly hope we provided you with the excellent service you deserve.  Please let us know if there is anything else we can do for you so that we can be sure you are leaving completely satisfied with your care experience.

## 2024-04-05 NOTE — NURSING NOTE
Hiro Carranza's goals for this visit include:   Chief Complaint   Patient presents with    RECHECK     mixed stress and urge incontinence  discuss med refills          She requests these members of her care team be copied on today's visit information:       PCP: Marie Robledo    Referring Provider:  No referring provider defined for this encounter.    LMP  (LMP Unknown)     Do you need any medication refills at today's visit?     Nova Wang LPN on 4/5/2024 at 8:35 AM

## 2024-04-06 ENCOUNTER — TRANSFERRED RECORDS (OUTPATIENT)
Dept: HEALTH INFORMATION MANAGEMENT | Facility: CLINIC | Age: 79
End: 2024-04-06

## 2024-04-12 ENCOUNTER — OFFICE VISIT (OUTPATIENT)
Dept: FAMILY MEDICINE | Facility: CLINIC | Age: 79
End: 2024-04-12
Payer: COMMERCIAL

## 2024-04-12 VITALS
HEART RATE: 49 BPM | DIASTOLIC BLOOD PRESSURE: 60 MMHG | WEIGHT: 266.7 LBS | SYSTOLIC BLOOD PRESSURE: 104 MMHG | TEMPERATURE: 97.5 F | OXYGEN SATURATION: 97 % | BODY MASS INDEX: 39.5 KG/M2 | HEIGHT: 69 IN | RESPIRATION RATE: 16 BRPM

## 2024-04-12 DIAGNOSIS — I49.9 IRREGULAR HEART BEATS: ICD-10-CM

## 2024-04-12 DIAGNOSIS — L97.919 STASIS ULCER OF RIGHT LOWER EXTREMITY (H): ICD-10-CM

## 2024-04-12 DIAGNOSIS — Z12.11 SCREEN FOR COLON CANCER: ICD-10-CM

## 2024-04-12 DIAGNOSIS — I83.019 STASIS ULCER OF RIGHT LOWER EXTREMITY (H): ICD-10-CM

## 2024-04-12 DIAGNOSIS — Z63.9 RELATIONSHIP DYSFUNCTION: ICD-10-CM

## 2024-04-12 DIAGNOSIS — E78.5 HYPERLIPIDEMIA LDL GOAL <130: ICD-10-CM

## 2024-04-12 DIAGNOSIS — I10 ESSENTIAL HYPERTENSION: Primary | ICD-10-CM

## 2024-04-12 DIAGNOSIS — R53.81 PHYSICAL DECONDITIONING: ICD-10-CM

## 2024-04-12 DIAGNOSIS — E66.01 MORBID OBESITY (H): ICD-10-CM

## 2024-04-12 DIAGNOSIS — I87.2 VENOUS (PERIPHERAL) INSUFFICIENCY: ICD-10-CM

## 2024-04-12 DIAGNOSIS — K21.00 GASTROESOPHAGEAL REFLUX DISEASE WITH ESOPHAGITIS WITHOUT HEMORRHAGE: ICD-10-CM

## 2024-04-12 PROCEDURE — 93000 ELECTROCARDIOGRAM COMPLETE: CPT | Performed by: FAMILY MEDICINE

## 2024-04-12 PROCEDURE — 99215 OFFICE O/P EST HI 40 MIN: CPT | Mod: 25 | Performed by: FAMILY MEDICINE

## 2024-04-12 RX ORDER — LOSARTAN POTASSIUM 25 MG/1
25 TABLET ORAL DAILY
Qty: 90 TABLET | Refills: 0 | Status: SHIPPED | OUTPATIENT
Start: 2024-04-12 | End: 2024-08-14 | Stop reason: DRUGHIGH

## 2024-04-12 SDOH — SOCIAL STABILITY - SOCIAL INSECURITY: PROBLEM RELATED TO PRIMARY SUPPORT GROUP, UNSPECIFIED: Z63.9

## 2024-04-12 ASSESSMENT — PAIN SCALES - GENERAL: PAINLEVEL: NO PAIN (0)

## 2024-04-12 NOTE — PROGRESS NOTES
k  Assessment & Plan     Essential hypertension  Blood pressures are a bit lower than ideal with current medications. She has been losing weight (less appetite).  No se's.  Labs UTD.  ---Will have her stop the hydrochlorothiazide 25mg/d, but continue on the losartan 25mg/d.  Losartan refills sent.  Follow-up in 2-3 months with HTN/BP/BMP recheck appt.    Irregular heart beats  Regular heart beat, though with multiple skipped beats, likely PVC's.  EKG also showing PVC, and possible other abnls.  Pulse seems to vary between ~50s and 80s at times when ascultating, both regular rhythms..   Will check ziopatch - 3 day.    - EKG 12-lead complete w/read - Clinics  - ZIO PATCH 3-7 DAYS (additional cost to patient)  - ZIO PATCH MAIL OUT; Future    Physical deconditioning  Pt notes it's getting harder for her to get out of chairs, and do stairs, walk longer distances.  Has a treadmill, will try and use it more.  Also rec doing exercises getting out of chairs.  Declines PT referral at this point, will try these efforts first.    Stasis ulcer of right lower extremity (H)  Venous (peripheral) insufficiency  Working with lymphedema therapists, with wraps and increased compression stockings (up to thigh and abd).    Relationship dysfunction  Notes  gets irritable when she leaves papers out, tries to work on it, but he's getting more irritable.  Interested in therapy for coping mechanisms.  - Adult Mental Health  Referral; Future    Morbid obesity (H)  Hyperlipidemia LDL goal <130  Wt decreasing as above, likely due to lower appetite.  Lipids looked good at last check in 10/23.    Gastroesophageal reflux disease with esophagitis without hemorrhage  Cont PPI.    Screen for colon cancer  Pt had recent colonoscopy- records not sent, but requested them today and received. Looks like fair prep, no polyps, rec 5 yr follow-up (though she'll be 83 yrs then, and may be stopping.      Follow-up in 2-3 months for HTN recheck  "off hydrochlorothiazide, BMP recheck. She can also try and get BP cuff and send in some BPs prior.     Follow-up Visit   Expected date:  Oct 12, 2024 (Approximate)      Follow Up Appointment Details:     Follow-up with whom?: Me    Follow-Up for what?: Medicare Wellness    Any Additional Chronic Condition Management?: Hypertension    Welcome or Annual?: Annual Wellness    How?: In Person                     I spent a total of 40 minutes on the day of the visit.   Time spent by me doing chart review, history and exam, documentation and further activities per the note      BMI  Estimated body mass index is 39.38 kg/m  as calculated from the following:    Height as of this encounter: 1.753 m (5' 9\").    Weight as of this encounter: 121 kg (266 lb 11.2 oz).   Weight management plan: Patient was referred to their PCP to discuss a diet and exercise plan.                Subjective   Hiro Bloom is a 78 year old, presenting for the following health issues:  Lipids and Hypertension        4/12/2024    10:25 AM   Additional Questions   Roomed by Shawna     History of Present Illness       Reason for visit:  Wellness visit  Symptoms include:  None    She eats 2-3 servings of fruits and vegetables daily.She consumes 0 sweetened beverage(s) daily.She exercises with enough effort to increase her heart rate 9 or less minutes per day.  She exercises with enough effort to increase her heart rate 3 or less days per week.   She is taking medications regularly.     - dialysis 3x/wk, but heart issues better (Watchman and pacemaker).  He still is resistant to a senior center move, to [facility], so she may put herself on a wait for a unit on [place] (potentially for after he passes away?)..      Abnormal heart beats-   Had an in-home wellness visit, okay but when she listened to her heart, couple missed beats heard. Asked her to discuss it here.  No chest pain, pressure or SOB..      Vascular follow-up - Had a consultation for vascular " "insufficiency, R calf hematoma.  Doing lymphedema therapy- since Dec '23,  Doing leg wraps and lymphatic therapy (1hr a day, started 2-3 weeks ago, feels 'quite delicious', once she gets it all on), and longer compression hose- thigh high. Also has one that goes up stomach.  Does think it's helping the R calf hematoma.      Mobility - about the same as it's been.  Needs to get on the treadmill.  Last did it ~3 wks ago.  Still an issue getting out of a chair and stairs.    Urology- incontinence, the meds help for sure, but she still always wears a pad.        Hyperlipidemia Follow-Up  Are you regularly taking any medication or supplement to lower your cholesterol?   Yes- Rosuvastatin  Are you having muscle aches or other side effects that you think could be caused by your cholesterol lowering medication?  No    Hypertension Follow-up  Do you check your blood pressure regularly outside of the clinic? No   Are you following a low salt diet? No  Are your blood pressures ever more than 140 on the top number (systolic) OR more   than 90 on the bottom number (diastolic), for example 140/90? Yes                Review of Systems  Constitutional, neuro, ENT, endocrine, pulmonary, cardiac, gastrointestinal, genitourinary, musculoskeletal, integument and psychiatric systems are negative, except as otherwise noted.      Objective    /60   Pulse (!) 49   Temp 97.5  F (36.4  C) (Temporal)   Resp 16   Ht 1.753 m (5' 9\")   Wt 121 kg (266 lb 11.2 oz)   LMP  (LMP Unknown)   SpO2 97%   Breastfeeding No   BMI 39.38 kg/m    Body mass index is 39.38 kg/m .  Physical Exam   GENERAL: alert and no distress  EYES: Eyes grossly normal to inspection, PERRL and conjunctivae and sclerae normal  NECK: no adenopathy, no asymmetry, masses, or scars  RESP: lungs clear to auscultation - no rales, rhonchi or wheezes  CV: regular rate and rhythm, normal S1 S2, no S3 or S4, no murmur, click or rub, no peripheral edema  MS: no gross " musculoskeletal defects noted, mild bilateral leg edema  PSYCH: mentation appears normal, affect normal/bright      Office Visit on 10/13/2023   Component Date Value Ref Range Status    Cholesterol 10/13/2023 146  <200 mg/dL Final    Triglycerides 10/13/2023 136  <150 mg/dL Final    Direct Measure HDL 10/13/2023 56  >=50 mg/dL Final    LDL Cholesterol Calculated 10/13/2023 63  <=100 mg/dL Final    Non HDL Cholesterol 10/13/2023 90  <130 mg/dL Final    Sodium 10/13/2023 140  135 - 145 mmol/L Final    Reference intervals for this test were updated on 09/26/2023 to more accurately reflect our healthy population. There may be differences in the flagging of prior results with similar values performed with this method. Interpretation of those prior results can be made in the context of the updated reference intervals.     Potassium 10/13/2023 3.9  3.4 - 5.3 mmol/L Final    Carbon Dioxide (CO2) 10/13/2023 26  22 - 29 mmol/L Final    Anion Gap 10/13/2023 12  7 - 15 mmol/L Final    Urea Nitrogen 10/13/2023 18.5  8.0 - 23.0 mg/dL Final    Creatinine 10/13/2023 0.74  0.51 - 0.95 mg/dL Final    GFR Estimate 10/13/2023 82  >60 mL/min/1.73m2 Final    Calcium 10/13/2023 9.6  8.8 - 10.2 mg/dL Final    Chloride 10/13/2023 102  98 - 107 mmol/L Final    Glucose 10/13/2023 90  70 - 99 mg/dL Final    Alkaline Phosphatase 10/13/2023 103  35 - 104 U/L Final    AST 10/13/2023 30  0 - 45 U/L Final    Reference intervals for this test were updated on 6/12/2023 to more accurately reflect our healthy population. There may be differences in the flagging of prior results with similar values performed with this method. Interpretation of those prior results can be made in the context of the updated reference intervals.    ALT 10/13/2023 27  0 - 50 U/L Final    Reference intervals for this test were updated on 6/12/2023 to more accurately reflect our healthy population. There may be differences in the flagging of prior results with similar values  performed with this method. Interpretation of those prior results can be made in the context of the updated reference intervals.      Protein Total 10/13/2023 7.0  6.4 - 8.3 g/dL Final    Albumin 10/13/2023 3.9  3.5 - 5.2 g/dL Final    Bilirubin Total 10/13/2023 0.4  <=1.2 mg/dL Final    Creatinine Urine mg/dL 10/13/2023 91.5  mg/dL Final    The reference ranges have not been established in urine creatinine. The results should be integrated into the clinical context for interpretation.    Albumin Urine mg/L 10/13/2023 <12.0  mg/L Final    The reference ranges have not been established in urine albumin. The results should be integrated into the clinical context for interpretation.    Albumin Urine mg/g Cr 10/13/2023    Final    Unable to calculate, urine albumin and/or urine creatinine is outside detectable limits.  Microalbuminuria is defined as an albumin:creatinine ratio of 17 to 299 for males and 25 to 299 for females. A ratio of albumin:creatinine of 300 or higher is indicative of overt proteinuria.  Due to biologic variability, positive results should be confirmed by a second, first-morning random or 24-hour timed urine specimen. If there is discrepancy, a third specimen is recommended. When 2 out of 3 results are in the microalbuminuria range, this is evidence for incipient nephropathy and warrants increased efforts at glucose control, blood pressure control, and institution of therapy with an angiotensin-converting-enzyme (ACE) inhibitor (if the patient can tolerate it).             Signed Electronically by: Marie Robledo MD

## 2024-04-19 ENCOUNTER — ORDERS ONLY (AUTO-RELEASED) (OUTPATIENT)
Dept: FAMILY MEDICINE | Facility: CLINIC | Age: 79
End: 2024-04-19
Payer: COMMERCIAL

## 2024-04-19 DIAGNOSIS — I49.9 IRREGULAR HEART BEATS: ICD-10-CM

## 2024-04-20 DIAGNOSIS — A60.04 HERPES SIMPLEX VULVOVAGINITIS: ICD-10-CM

## 2024-04-22 RX ORDER — VALACYCLOVIR HYDROCHLORIDE 500 MG/1
TABLET, FILM COATED ORAL
Qty: 36 TABLET | Refills: 0 | Status: SHIPPED | OUTPATIENT
Start: 2024-04-22 | End: 2024-10-02

## 2024-04-23 ENCOUNTER — TELEPHONE (OUTPATIENT)
Dept: FAMILY MEDICINE | Facility: CLINIC | Age: 79
End: 2024-04-23
Payer: COMMERCIAL

## 2024-04-23 NOTE — TELEPHONE ENCOUNTER
CW,        Nurse Triage SBAR    Is this a 2nd Level Triage? NO    Situation:  patient called.  States her feet, ankles and lower legs have been swollen for about a week  Hard to get her shoes on.    Background:  last appointment was 4/12 - states hydrochlorothiazide was stopped. Asking if she should restart the medication     Assessment:  Denies any SOB, any other symptoms    Recommendation:      Routed to provider      Thank you,  Aarti Black RN

## 2024-04-23 NOTE — TELEPHONE ENCOUNTER
Sorry to hear, okay to restart the hydrochlorothiazide, but...    Has she been taking BPs?  Have they been any higher/better (low at and prior to her last appt, along with pulse)?    Has she been able to start the ziopatch monitor?    I'd also like to see if we can get her in to be seen 3-4 weeks after restarting the medication.    Could you see if she could take the 5/15 noon/11:40 arrival slot for HTN/leg swelling follow-up?    Thank you!  CW

## 2024-04-23 NOTE — TELEPHONE ENCOUNTER
CW,      Spoke with patient.    Verbalizes understanding to restart hydrochlorothiazide.    Bought a new BP machine.  Needs to set it up so hasn't been able to check her BP.  She will check her BP when machine working and call back with results along with pulse.    Started Zio patch today.    Scheduled her to follow up with you on 5/15.      Thanks,  Aarti Black RN

## 2024-04-30 NOTE — TELEPHONE ENCOUNTER
CW,      Found this message in open encounters.  Want to make sure you saw this.      Thank you,  Aarti Black RN

## 2024-04-30 NOTE — TELEPHONE ENCOUNTER
Sorry, will close the encounter since she has an appt coming up on 5/15/24 to check in on things.  Thanks- CW

## 2024-05-07 DIAGNOSIS — B37.2 INTERTRIGINOUS CANDIDIASIS: ICD-10-CM

## 2024-05-08 RX ORDER — CLOTRIMAZOLE 1 %
CREAM (GRAM) TOPICAL
Qty: 90 G | Refills: 1 | Status: SHIPPED | OUTPATIENT
Start: 2024-05-08

## 2024-05-09 PROCEDURE — 93244 EXT ECG>48HR<7D REV&INTERPJ: CPT | Performed by: INTERNAL MEDICINE

## 2024-05-15 ENCOUNTER — OFFICE VISIT (OUTPATIENT)
Dept: FAMILY MEDICINE | Facility: CLINIC | Age: 79
End: 2024-05-15
Payer: COMMERCIAL

## 2024-05-15 VITALS
TEMPERATURE: 97.5 F | SYSTOLIC BLOOD PRESSURE: 105 MMHG | OXYGEN SATURATION: 97 % | BODY MASS INDEX: 41.45 KG/M2 | HEART RATE: 69 BPM | HEIGHT: 67 IN | RESPIRATION RATE: 16 BRPM | DIASTOLIC BLOOD PRESSURE: 58 MMHG | WEIGHT: 264.1 LBS

## 2024-05-15 DIAGNOSIS — R06.02 SHORTNESS OF BREATH: ICD-10-CM

## 2024-05-15 DIAGNOSIS — I87.2 VENOUS (PERIPHERAL) INSUFFICIENCY: ICD-10-CM

## 2024-05-15 DIAGNOSIS — Z23 ENCOUNTER FOR IMMUNIZATION: ICD-10-CM

## 2024-05-15 DIAGNOSIS — I47.10 PAROXYSMAL SUPRAVENTRICULAR TACHYCARDIA (H): ICD-10-CM

## 2024-05-15 DIAGNOSIS — R60.0 BILATERAL LEG EDEMA: ICD-10-CM

## 2024-05-15 DIAGNOSIS — I10 ESSENTIAL HYPERTENSION: Primary | ICD-10-CM

## 2024-05-15 PROCEDURE — 91320 SARSCV2 VAC 30MCG TRS-SUC IM: CPT | Performed by: FAMILY MEDICINE

## 2024-05-15 PROCEDURE — 99215 OFFICE O/P EST HI 40 MIN: CPT | Performed by: FAMILY MEDICINE

## 2024-05-15 PROCEDURE — 90480 ADMN SARSCOV2 VAC 1/ONLY CMP: CPT | Performed by: FAMILY MEDICINE

## 2024-05-15 PROCEDURE — 83880 ASSAY OF NATRIURETIC PEPTIDE: CPT | Performed by: FAMILY MEDICINE

## 2024-05-15 PROCEDURE — 80048 BASIC METABOLIC PNL TOTAL CA: CPT | Performed by: FAMILY MEDICINE

## 2024-05-15 PROCEDURE — G2211 COMPLEX E/M VISIT ADD ON: HCPCS | Performed by: FAMILY MEDICINE

## 2024-05-15 PROCEDURE — 36415 COLL VENOUS BLD VENIPUNCTURE: CPT | Performed by: FAMILY MEDICINE

## 2024-05-15 RX ORDER — LOSARTAN POTASSIUM 25 MG/1
12.5 TABLET ORAL DAILY
Qty: 45 TABLET | Refills: 0 | Status: SHIPPED | OUTPATIENT
Start: 2024-05-15 | End: 2024-07-11

## 2024-05-15 ASSESSMENT — PAIN SCALES - GENERAL: PAINLEVEL: NO PAIN (0)

## 2024-05-15 NOTE — PROGRESS NOTES
Assessment & Plan     Essential hypertension  BPs still borderline too low.  We tried stopping the hydrochlorothiazide last time due to lower BPs, but she noted increased LE edema, so we restarted.  ----Will try to lower losartan to 12.5mg (half-tab- if she can do this with the pill cutter- let us know if not). Cont hydrochlorothiazide at 25mg/d.  ----Follow-up in 3 months for recheck. Will try and get new home BP monitor and bring in to next appt.  - Basic metabolic panel  (Ca, Cl, CO2, Creat, Gluc, K, Na, BUN); Future  - losartan (COZAAR) 25 MG tablet; Take 0.5 tablets (12.5 mg) by mouth daily  - PRIMARY CARE FOLLOW-UP SCHEDULING; Future  - Basic metabolic panel  (Ca, Cl, CO2, Creat, Gluc, K, Na, BUN)    Paroxysmal supraventricular tachycardia (H24)  Reviewed recent 3-day ziopath.  Mostly sinus rhythm, even when she pushed the button (she does not recall any significant sx's).  Did have several (54) episodes of SVT, longest 10 run beat. Asymptomatic.  Will get cardiac work-up below, and consider cardiology follow-up.  - PRIMARY CARE FOLLOW-UP SCHEDULING; Future    Venous (peripheral) insufficiency  Bilateral leg edema  Shortness of breath  Pt following with vascular clinic, and getting some improvement with thigh-high compression stockings, and nightly pneumatic compression, and getting back on the hydrochlorothiazide.  Reviewed chart, do not see she has had an ECHO or BNP (but did have dobutamine ECHO in '11, normal)- will order these today.  - Echocardiogram Complete; Future  - BNP-N terminal pro; Future    Encounter for immunization  Risks/benefits discussed, given today.   - COVID-19 12+ (2023-24) (PFIZER)       Follow-up Visit   Expected date:  Aug 15, 2024 (Approximate)      Follow Up Appointment Details:     Follow-up with whom?: Me    Follow-Up for what?: Chronic Disease f/u    Chronic Disease f/u: Hypertension Comment - LE edema    How?: In Person                     The longitudinal plan of care for  the diagnosis(es)/condition(s) as documented were addressed during this visit. Due to the added complexity in care, I will continue to support Hiro Bloom in the subsequent management and with ongoing continuity of care.       I spent a total of 45 minutes on the day of the visit.   Time spent by me doing chart review, history and exam, documentation and further activities per the note              Subjective   Hiro Bloom is a 78 year old, presenting for the following health issues:  Hypertension and Edema        5/15/2024    11:51 AM   Additional Questions   Roomed by Shawna     History of Present Illness       Reason for visit:  I don't recall. Am I supposed to come in for blood work?    She eats 0-1 servings of fruits and vegetables daily.She consumes 1 sweetened beverage(s) daily.She exercises with enough effort to increase her heart rate 9 or less minutes per day.  She exercises with enough effort to increase her heart rate 3 or less days per week.   She is taking medications regularly.         Hypertension Follow-up  Do you check your blood pressure regularly outside of the clinic? No - Has been unable to get monitor to work  Are you following a low salt diet? No  Are your blood pressures ever more than 140 on the top number (systolic) OR more   than 90 on the bottom number (diastolic), for example 140/90? No    At last visit, BP was low, so we stopped the hydrochlorothiazide.  She noticed increased swelling, and irregular urination.  Restarted it, and LE edema improved a bit (not resolved), though she's doing a few newer things for the edema.      LE edema- seeing vascular clinic  Edema   - now has thigh high - but folding down, comfortable otherwise.  - Using a lower body massage therapy that is for swelling - hard to get on, but nice. Goes for an hour.  - Has been effective in managing and swelling has midly improved (Noticed feet has looked more slender)  - Will try and get yoga pants to make it easier to get  "it on.        Review of Systems  Constitutional, neuro, ENT, endocrine, pulmonary, cardiac, gastrointestinal, genitourinary, musculoskeletal, integument and psychiatric systems are negative, except as otherwise noted.      Objective    /58 (BP Location: Right arm, Patient Position: Other (comments), Cuff Size: Adult Regular)   Pulse 69   Temp 97.5  F (36.4  C) (Temporal)   Resp 16   Ht 1.707 m (5' 7.2\")   Wt 119.8 kg (264 lb 1.6 oz)   LMP  (LMP Unknown)   SpO2 97%   Breastfeeding No   BMI 41.12 kg/m    Body mass index is 41.12 kg/m .  Physical Exam   GENERAL: alert and no distress  EYES: Eyes grossly normal to inspection, PERRL and conjunctivae and sclerae normal  NECK: no adenopathy, no asymmetry, masses, or scars  RESP: lungs clear to auscultation - no rales, rhonchi or wheezes  CV: regular rate and rhythm, normal S1 S2, no S3 or S4, no murmur, click or rub, no peripheral edema  ABDOMEN: soft, nontender, no hepatosplenomegaly, no masses and bowel sounds normal  MS: no gross musculoskeletal defects noted, 2+ bilateral edema      Office Visit on 10/13/2023   Component Date Value Ref Range Status    Cholesterol 10/13/2023 146  <200 mg/dL Final    Triglycerides 10/13/2023 136  <150 mg/dL Final    Direct Measure HDL 10/13/2023 56  >=50 mg/dL Final    LDL Cholesterol Calculated 10/13/2023 63  <=100 mg/dL Final    Non HDL Cholesterol 10/13/2023 90  <130 mg/dL Final    Sodium 10/13/2023 140  135 - 145 mmol/L Final    Reference intervals for this test were updated on 09/26/2023 to more accurately reflect our healthy population. There may be differences in the flagging of prior results with similar values performed with this method. Interpretation of those prior results can be made in the context of the updated reference intervals.     Potassium 10/13/2023 3.9  3.4 - 5.3 mmol/L Final    Carbon Dioxide (CO2) 10/13/2023 26  22 - 29 mmol/L Final    Anion Gap 10/13/2023 12  7 - 15 mmol/L Final    Urea Nitrogen " 10/13/2023 18.5  8.0 - 23.0 mg/dL Final    Creatinine 10/13/2023 0.74  0.51 - 0.95 mg/dL Final    GFR Estimate 10/13/2023 82  >60 mL/min/1.73m2 Final    Calcium 10/13/2023 9.6  8.8 - 10.2 mg/dL Final    Chloride 10/13/2023 102  98 - 107 mmol/L Final    Glucose 10/13/2023 90  70 - 99 mg/dL Final    Alkaline Phosphatase 10/13/2023 103  35 - 104 U/L Final    AST 10/13/2023 30  0 - 45 U/L Final    Reference intervals for this test were updated on 6/12/2023 to more accurately reflect our healthy population. There may be differences in the flagging of prior results with similar values performed with this method. Interpretation of those prior results can be made in the context of the updated reference intervals.    ALT 10/13/2023 27  0 - 50 U/L Final    Reference intervals for this test were updated on 6/12/2023 to more accurately reflect our healthy population. There may be differences in the flagging of prior results with similar values performed with this method. Interpretation of those prior results can be made in the context of the updated reference intervals.      Protein Total 10/13/2023 7.0  6.4 - 8.3 g/dL Final    Albumin 10/13/2023 3.9  3.5 - 5.2 g/dL Final    Bilirubin Total 10/13/2023 0.4  <=1.2 mg/dL Final    Creatinine Urine mg/dL 10/13/2023 91.5  mg/dL Final    The reference ranges have not been established in urine creatinine. The results should be integrated into the clinical context for interpretation.    Albumin Urine mg/L 10/13/2023 <12.0  mg/L Final    The reference ranges have not been established in urine albumin. The results should be integrated into the clinical context for interpretation.    Albumin Urine mg/g Cr 10/13/2023    Final    Unable to calculate, urine albumin and/or urine creatinine is outside detectable limits.  Microalbuminuria is defined as an albumin:creatinine ratio of 17 to 299 for males and 25 to 299 for females. A ratio of albumin:creatinine of 300 or higher is indicative of  overt proteinuria.  Due to biologic variability, positive results should be confirmed by a second, first-morning random or 24-hour timed urine specimen. If there is discrepancy, a third specimen is recommended. When 2 out of 3 results are in the microalbuminuria range, this is evidence for incipient nephropathy and warrants increased efforts at glucose control, blood pressure control, and institution of therapy with an angiotensin-converting-enzyme (ACE) inhibitor (if the patient can tolerate it).           Ziopatch 3-7 day monitor- 5/9/24   Runs of nonsustained SVT.  Symptomatic episodes corresponded to sinus rhythm with PACs and PVCs.      Signed Electronically by: Marie Robledo MD

## 2024-05-15 NOTE — NURSING NOTE
Per orders of CW, injection of COVID given by Katlin Herbert RN. Prior to immunization administration, verified patients identity using patient s name and date of birth. Patient instructed to remain in clinic for 15 minutes afterwards, and to report any adverse reaction to me or clinic staff immediately.    Katlin Herbert RN on 5/15/2024 at 1:25 PM.    Please see Immunization Activity for additional information.

## 2024-05-16 LAB
ANION GAP SERPL CALCULATED.3IONS-SCNC: 11 MMOL/L (ref 7–15)
BUN SERPL-MCNC: 13.9 MG/DL (ref 8–23)
CALCIUM SERPL-MCNC: 9.7 MG/DL (ref 8.8–10.2)
CHLORIDE SERPL-SCNC: 101 MMOL/L (ref 98–107)
CREAT SERPL-MCNC: 0.67 MG/DL (ref 0.51–0.95)
DEPRECATED HCO3 PLAS-SCNC: 26 MMOL/L (ref 22–29)
EGFRCR SERPLBLD CKD-EPI 2021: 89 ML/MIN/1.73M2
GLUCOSE SERPL-MCNC: 94 MG/DL (ref 70–99)
NT-PROBNP SERPL-MCNC: 110 PG/ML (ref 0–1800)
POTASSIUM SERPL-SCNC: 3.8 MMOL/L (ref 3.4–5.3)
SODIUM SERPL-SCNC: 138 MMOL/L (ref 135–145)

## 2024-05-16 NOTE — RESULT ENCOUNTER NOTE
-Your basic metabolic panel (which includes electrolyte levels, blood sugar level and kidney function tests) looks good/normal.  -Your BNP level also looks good, which is reassuring that you do not have signs of heart failure.    Please let me know if you have any questions.  Best,   Mitchell Robledo MD     56 YO male with a pituitary mass preop for resection today

## 2024-06-19 ENCOUNTER — ANCILLARY PROCEDURE (OUTPATIENT)
Dept: CARDIOLOGY | Facility: CLINIC | Age: 79
End: 2024-06-19
Attending: FAMILY MEDICINE
Payer: COMMERCIAL

## 2024-06-19 DIAGNOSIS — I87.2 VENOUS (PERIPHERAL) INSUFFICIENCY: ICD-10-CM

## 2024-06-19 DIAGNOSIS — R60.0 BILATERAL LEG EDEMA: ICD-10-CM

## 2024-06-19 LAB — LVEF ECHO: NORMAL

## 2024-06-19 PROCEDURE — 93306 TTE W/DOPPLER COMPLETE: CPT | Performed by: INTERNAL MEDICINE

## 2024-06-19 RX ADMIN — Medication 5 ML: at 14:00

## 2024-06-20 DIAGNOSIS — K21.00 GASTROESOPHAGEAL REFLUX DISEASE WITH ESOPHAGITIS WITHOUT HEMORRHAGE: ICD-10-CM

## 2024-06-20 DIAGNOSIS — K22.710 BARRETT'S ESOPHAGUS WITH LOW GRADE DYSPLASIA: ICD-10-CM

## 2024-07-08 NOTE — RESULT ENCOUNTER NOTE
Sorry for the delay in getting my thoughts to you on your cardiac echocardiogram (I was away from the clinic for a couple weeks). Your heart echocardiogram looked very good with no signs of heart failure or valve issues.     Please let me know if you have any questions.  Best,   Mitchell Robledo MD

## 2024-07-10 DIAGNOSIS — I10 ESSENTIAL HYPERTENSION: ICD-10-CM

## 2024-07-11 RX ORDER — LOSARTAN POTASSIUM 25 MG/1
12.5 TABLET ORAL DAILY
Qty: 45 TABLET | Refills: 2 | Status: SHIPPED | OUTPATIENT
Start: 2024-07-11

## 2024-08-14 ENCOUNTER — OFFICE VISIT (OUTPATIENT)
Dept: FAMILY MEDICINE | Facility: CLINIC | Age: 79
End: 2024-08-14
Payer: COMMERCIAL

## 2024-08-14 VITALS
WEIGHT: 261.2 LBS | HEART RATE: 86 BPM | OXYGEN SATURATION: 96 % | BODY MASS INDEX: 41 KG/M2 | HEIGHT: 67 IN | SYSTOLIC BLOOD PRESSURE: 127 MMHG | RESPIRATION RATE: 16 BRPM | TEMPERATURE: 97.1 F | DIASTOLIC BLOOD PRESSURE: 76 MMHG

## 2024-08-14 DIAGNOSIS — Z86.79 HISTORY OF IRREGULAR HEARTBEAT: ICD-10-CM

## 2024-08-14 DIAGNOSIS — I10 ESSENTIAL HYPERTENSION: Primary | ICD-10-CM
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
## 2024-08-14 DIAGNOSIS — I87.2 VENOUS (PERIPHERAL) INSUFFICIENCY: ICD-10-CM

## 2024-08-14 DIAGNOSIS — R60.0 BILATERAL LEG EDEMA: ICD-10-CM

## 2024-08-14 PROCEDURE — 99215 OFFICE O/P EST HI 40 MIN: CPT | Performed by: FAMILY MEDICINE

## 2024-08-14 RX ORDER — HYDROCHLOROTHIAZIDE 25 MG/1
25 TABLET ORAL DAILY
Qty: 90 TABLET | Refills: 0 | Status: SHIPPED | OUTPATIENT
Start: 2024-08-14

## 2024-08-14 ASSESSMENT — PAIN SCALES - GENERAL: PAINLEVEL: NO PAIN (0)

## 2024-08-14 NOTE — PROGRESS NOTES
Assessment & Plan     Essential hypertension  BPs have been okay, but pt did not stay on the hydrochlorothiazide as we discussed after her 4/24 appt (when she called saying LE edema increased after we stopped it).  We did discuss she was back on hydrochlorothiazide at her 5/24, which she does not remember, so she is unsure when she went back off.  She is having LE edema, bit worse in left leg now due to recent fall/bruising.  ----Will have her continue on the lower dose losartan 12.5mg/d  ----restart the hydrochlorothiazide to see if it helps her LE edema, but caution to make sure her BPs do not go to low and that she does not get dizziness/orthostatic hypotension.  - hydrochlorothiazide (HYDRODIURIL) 25 MG tablet; Take 1 tablet (25 mg) by mouth daily    Venous (peripheral) insufficiency  Bilateral leg edema  As above, and cont follow-up with vascular (and use compression stockings through them).  - hydrochlorothiazide (HYDRODIURIL) 25 MG tablet; Take 1 tablet (25 mg) by mouth daily    Fall, initial encounter  Fall recently, feels she tripped on her sandal/floor issue, denies any dizziness or weakness.  Bruising on left leg and increased swelling, but otherwise no signs of worse injuries.  RTC if symptoms persist or worsen.     Non-sustained paroxysmal SVT-   Ziopatch ordered in 4/24 for mention of irregular heart beats from in-home wellness visit.  Multiple PVC's, SVT at times. Follow-up echo- okay.  Pt has not noticed recent palpitations. No chest pains or SOB.  No further work-up needed.    MDD- doing okay overall, but tearful at times, difficult with 's health/criticisms.      Follow-up appt on 10/22/24- preventive and follow-up HTN        I spent a total of 45 minutes on the day of the visit.   Time spent by me doing chart review, history and exam, documentation and further activities per the note            Subjective   Hiro Bloom is a 78 year old, presenting for the following health  issues:  Hypertension and Lipids    HPI     Edema in legs   Shares that she has gone out last night to the [employer] [place] and has fallen at the front door due to the rug on the floor   - concerned about left leg  -----------------------------------    Social-   -- was hospitalized again- too weak to get off the floor (last Sat).  On O2, needs to wean off that and get mobility back before he can move back home.  --[address]- has down payment- she'd like to move there, but he does now.      Fall- ~2 wks ago, tripped on sandal, hip/knee primarily, did also hit her head secondarily/lightly.  People very helpful right away there at the [facility].  Left leg with some bruising and swelling since then.        HTN- lowered losartan to 12.5mg at last appt.  Didn't remember we had discussed restarting the hydrochlorothiazide after her 4/24 appt, see TE.  Also discussed she thought her edema was improved back on it at her last 5/24 appt.  Now doesn't remember going back on it??  Hasn't been taking it.  Swelling worse in left leg after wall.        Cards follow-up - did ziopatch for sx's of irregular heart beats.  Multiple PVC's, SVT at times.  Follow-up echo- okay.  Pt has not noticed recent palpitations.  No chest pains or SOB.      Energy- seems okay.  Breathing- seems to be okay.  Has stairs at home, 7-8 from getting to living room or down to basement.  Hasn't been out walking due to 's poor health.  Also has a treadmill downstairs.      Mood-   Sometimes breaks down and is tearful.     sometimes lays criticisms on her. He used to do most of the cooking, can be critical of her cooking now that she is doing more. They are eating out more now.  Thinking about meals on wheels- there may be a [employer] option.      Hearing- worsening.  Did audiology exam, referred to someone else in [place].        Hyperlipidemia Follow-Up  Are you regularly taking any medication or supplement to lower your cholesterol?    "Yes- Rosuvastatin  Are you having muscle aches or other side effects that you think could be caused by your cholesterol lowering medication?  No    Hypertension Follow-up  Do you check your blood pressure regularly outside of the clinic? No - Had ordered a bp monitor but has not used it yet because of the side of the arm cuff   Are you following a low salt diet? No  Are your blood pressures ever more than 140 on the top number (systolic) OR more   than 90 on the bottom number (diastolic), for example 140/90? No            Review of Systems  Constitutional, neuro, ENT, endocrine, pulmonary, cardiac, gastrointestinal, genitourinary, musculoskeletal, integument and psychiatric systems are negative, except as otherwise noted.      Objective    /76 (BP Location: Right arm, Patient Position: Sitting, Cuff Size: Adult Regular)   Pulse 86   Temp 97.1  F (36.2  C) (Temporal)   Resp 16   Ht 1.707 m (5' 7.2\")   Wt 118.5 kg (261 lb 3.2 oz)   LMP  (LMP Unknown)   SpO2 96%   Breastfeeding No   BMI 40.67 kg/m    Body mass index is 40.67 kg/m .  Physical Exam   GENERAL: alert and no distress  NECK: no adenopathy, no asymmetry, masses, or scars  RESP: lungs clear to auscultation - no rales, rhonchi or wheezes  CV: regular rate and rhythm, normal S1 S2, no S3 or S4, no murmur, click or rub, no peripheral edema  MS: no gross musculoskeletal defects noted, no edema  PSYCH: mentation appears normal, affect normal/bright      Ancillary Procedure on 06/19/2024   Component Date Value Ref Range Status    LVEF  06/19/2024 55-60%   Final           Signed Electronically by: Marie Robledo MD    "

## 2024-09-02 PROBLEM — Z86.79 HISTORY OF IRREGULAR HEARTBEAT: Status: ACTIVE | Noted: 2024-09-02

## 2024-09-05 DIAGNOSIS — N39.46 MIXED STRESS AND URGE URINARY INCONTINENCE: ICD-10-CM

## 2024-09-05 NOTE — TELEPHONE ENCOUNTER
COMPOUNDED NON-CONTROLLED SUBSTANCE (CMPD RX) - PHARMACY TO MIX COMPOUNDED MEDICATION   30 g 11 8/25/2022     Last Office Visit : 4-5-2024  Future Office visit:  4-7-2025

## 2024-10-01 DIAGNOSIS — A60.04 HERPES SIMPLEX VULVOVAGINITIS: ICD-10-CM

## 2024-10-02 RX ORDER — VALACYCLOVIR HYDROCHLORIDE 500 MG/1
TABLET, FILM COATED ORAL
Qty: 36 TABLET | Refills: 0 | Status: SHIPPED | OUTPATIENT
Start: 2024-10-02

## 2024-10-07 ENCOUNTER — TELEPHONE (OUTPATIENT)
Dept: FAMILY MEDICINE | Facility: CLINIC | Age: 79
End: 2024-10-07
Payer: COMMERCIAL

## 2024-10-07 NOTE — TELEPHONE ENCOUNTER
Pt called clinic to see if they had their flu vaccine. Pt said when she was in last for a visit she had asked for it but she is pretty sure it was forgotten. Informed pt per the [facility] and immunization tab in epic she has not had her flu vaccine yet this year. Pt informed me she is going to get her vaccine at [facility] pharmacy. No further questions at this time.

## 2024-10-12 DIAGNOSIS — E78.5 HYPERLIPIDEMIA LDL GOAL <130: ICD-10-CM

## 2024-10-14 RX ORDER — ROSUVASTATIN CALCIUM 20 MG/1
20 TABLET, COATED ORAL DAILY
Qty: 90 TABLET | Refills: 0 | Status: SHIPPED | OUTPATIENT
Start: 2024-10-14 | End: 2024-10-22

## 2024-10-22 ENCOUNTER — OFFICE VISIT (OUTPATIENT)
Dept: FAMILY MEDICINE | Facility: CLINIC | Age: 79
End: 2024-10-22
Attending: FAMILY MEDICINE
Payer: COMMERCIAL

## 2024-10-22 VITALS
OXYGEN SATURATION: 96 % | TEMPERATURE: 98.4 F | HEART RATE: 89 BPM | SYSTOLIC BLOOD PRESSURE: 109 MMHG | BODY MASS INDEX: 39.63 KG/M2 | DIASTOLIC BLOOD PRESSURE: 58 MMHG | WEIGHT: 252.5 LBS | RESPIRATION RATE: 16 BRPM | HEIGHT: 67 IN

## 2024-10-22 DIAGNOSIS — E78.5 HYPERLIPIDEMIA LDL GOAL <130: ICD-10-CM

## 2024-10-22 DIAGNOSIS — F43.0 ACUTE REACTION TO STRESS: ICD-10-CM
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
## 2024-10-22 DIAGNOSIS — Z12.31 ENCOUNTER FOR SCREENING MAMMOGRAM FOR MALIGNANT NEOPLASM OF BREAST: ICD-10-CM

## 2024-10-22 DIAGNOSIS — I87.2 VENOUS (PERIPHERAL) INSUFFICIENCY: ICD-10-CM

## 2024-10-22 DIAGNOSIS — Z00.00 ENCOUNTER FOR MEDICARE ANNUAL WELLNESS EXAM: Primary | ICD-10-CM

## 2024-10-22 DIAGNOSIS — R60.0 BILATERAL LEG EDEMA: ICD-10-CM

## 2024-10-22 DIAGNOSIS — Z63.0 RELATIONSHIP PARTNER UNWELL: ICD-10-CM

## 2024-10-22 DIAGNOSIS — K21.00 GASTROESOPHAGEAL REFLUX DISEASE WITH ESOPHAGITIS WITHOUT HEMORRHAGE: ICD-10-CM

## 2024-10-22 DIAGNOSIS — I10 ESSENTIAL HYPERTENSION: ICD-10-CM

## 2024-10-22 DIAGNOSIS — K22.710 BARRETT'S ESOPHAGUS WITH LOW GRADE DYSPLASIA: ICD-10-CM

## 2024-10-22 RX ORDER — HYDROCHLOROTHIAZIDE 25 MG/1
25 TABLET ORAL DAILY
Qty: 90 TABLET | Refills: 1 | Status: SHIPPED | OUTPATIENT
Start: 2024-10-22

## 2024-10-22 RX ORDER — ROSUVASTATIN CALCIUM 20 MG/1
20 TABLET, COATED ORAL DAILY
Qty: 90 TABLET | Refills: 0 | Status: SHIPPED | OUTPATIENT
Start: 2024-10-22

## 2024-10-22 SDOH — HEALTH STABILITY: PHYSICAL HEALTH: ON AVERAGE, HOW MANY DAYS PER WEEK DO YOU ENGAGE IN MODERATE TO STRENUOUS EXERCISE (LIKE A BRISK WALK)?: 0 DAYS

## 2024-10-22 SDOH — SOCIAL STABILITY - SOCIAL INSECURITY: PROBLEMS IN RELATIONSHIP WITH SPOUSE OR PARTNER: Z63.0

## 2024-10-22 SDOH — HEALTH STABILITY: PHYSICAL HEALTH: ON AVERAGE, HOW MANY MINUTES DO YOU ENGAGE IN EXERCISE AT THIS LEVEL?: 0 MIN

## 2024-10-22 ASSESSMENT — PAIN SCALES - GENERAL: PAINLEVEL: NO PAIN (0)

## 2024-10-22 ASSESSMENT — SOCIAL DETERMINANTS OF HEALTH (SDOH): HOW OFTEN DO YOU GET TOGETHER WITH FRIENDS OR RELATIVES?: ONCE A WEEK

## 2024-10-22 NOTE — PROGRESS NOTES
Preventive Care Visit  [facility]  Marie Robledo MD, Family Medicine  Oct 22, 2024      Assessment & Plan     Encounter for Medicare annual wellness exam  H-RN Wellness Visit Notes: MD updates  -Mammogram: Last done 8/2023 (impression: negative). Previously discontinued. Pt reports she would like to continue. Pt requesting referral to complete mammogram at [facility] location.  Referral placed- can schedule at [facility].  -DEXA: Last done 8/2023 (impression: osteopenia). Due 8/2026.  -PAP: Last done 8/2011 (impression: NIL). Provider to review PAP recommendations. Okay to discontinue pap screening.  -Colon Cancer Screening: Last done via colonoscopy on 11/2023. (Impression: diverticulosis in sigmoid colon, exam was otherwise normal, no specimens collected). Due 11/2028.   -Dermatology: Pt verbalized they do meet with dermatology regularly.  -Immunizations: Patient is up to date.  - MA Screen Bilateral w/Nathan; Future    Essential hypertension/  Bilateral leg edema/  Venous (peripheral) insufficiency  Blood pressure in good control on current medication/s - losartan 12.5mg/d (added by vascular) and hydrochlorothiazide 25mg/d- added back last visit for her LE edema, which pt think is helping lessen leg edema.  No side effects. Labs UTD.  Will continue current meds, refills sent.  Continue every six month follow-up.    - Comprehensive metabolic panel (BMP + Alb, Alk Phos, ALT, AST, Total. Bili, TP); Future  - Albumin Random Urine Quantitative with Creat Ratio; Future  - hydrochlorothiazide (HYDRODIURIL) 25 MG tablet; Take 1 tablet (25 mg) by mouth daily.    Relationship partner unwell  Acute reaction to stress  Stressors with her 's illness and temper.  Feels safe, but very frustrated with his behavior at times.  Will refer to therapy for eval/txt, and see if he would consider couples therapy.  - Adult Mental Health  Referral; Future    Fall, initial encounter  Tripped on  "lower step of two steps going up from garage to kitchen last week. Carrying groceries.  Fell on left side of leg, was able to get up on her own with a bit of time.  Has bruising on left lateral leg, but very minimal pain/discomfort other than when pushing on patellas.    Hyperlipidemia LDL goal <130  Doing well on crestor, no se's, labs as below.  Will rtc for fasting labs.  - Comprehensive metabolic panel (BMP + Alb, Alk Phos, ALT, AST, Total. Bili, TP); Future  - Albumin Random Urine Quantitative with Creat Ratio; Future  - rosuvastatin (CRESTOR) 20 MG tablet; Take 1 tablet (20 mg) by mouth daily.  - Lipid panel reflex to direct LDL Fasting; Future  - **Glucose FUTURE 2mo; Future    Stallings's esophagus with low grade dysplasia  Gastroesophageal reflux disease with esophagitis without hemorrhage  - omeprazole (PRILOSEC) 20 MG DR capsule; Take 1 capsule (20 mg) by mouth 2 times daily.    Encounter for screening mammogram for malignant neoplasm of breast  - MA Screen Bilateral w/Nathan; Future    Patient has been advised of split billing requirements and indicates understanding: Yes        BMI  Estimated body mass index is 40.14 kg/m  as calculated from the following:    Height as of this encounter: 1.689 m (5' 6.5\").    Weight as of this encounter: 114.5 kg (252 lb 8 oz).   Weight management plan: Discussed healthy diet and exercise guidelines    Counseling  Appropriate preventive services were addressed with this patient via screening, questionnaire, or discussion as appropriate for fall prevention, nutrition, physical activity, Tobacco-use cessation, social engagement, weight loss and cognition.  Checklist reviewing preventive services available has been given to the patient.  Reviewed patient's diet, addressing concerns and/or questions.   Discussed possible causes of fatigue. Patient reported safety concerns were addressed today.The patient was provided with written information regarding signs of hearing loss. "   Information on urinary incontinence and treatment options given to patient.               Subjective   Hiro Bloom is a 79 year old, presenting for the following:  Physical (AWV)        10/22/2024    10:35 AM   Additional Questions   Roomed by WENDY Mcmahon   Accompanied by N/A         Health Care Directive  Patient has a Health Care Directive on file  Advance care planning document is on file but is outdated.  Patient encouraged to update.    [facility]    Wellness Visit Notes:   -Mammogram: Last done 8/2023 (impression: negative). Previously discontinued. Pt reports she would like to continue. Pt requesting referral to complete mammogram at [facility].   -DEXA: Last done 8/2023 (impression: osteopenia). Due 8/2026.  -PAP: Last done 8/2011 (impression: NIL). Provider to review PAP recommendations.   -Colon Cancer Screening: Last done via colonoscopy on 11/2023. (Impression: diverticulosis in sigmoid colon, exam was otherwise normal, no specimens collected). Due 11/2028.   -Dermatology: Pt verbalized they do meet with dermatology regularly.  -Immunizations: Patient is up to date.  -------------------------------------------------------------------------     was hospitalized, then TCU x 35 days.  Home now, and that is going pretty well.  Rich is doing home care, along with visits with nurse, PT and OT.  He does better with those visits.    He does have a temper, worse when they are both in the house, he gets upset about the piles of paper around the house.  Blows over very quickly, but frustrating for her.    They may move to a senior living place much closer to here.  Have been on the waitlist for years, but just moved to the active wait list after her 's son toured with them and really encouraged his dad to move there.  Pt would be much happier there, closer to town.      HTN/LE edema- on losartan 12.5mg/d, started by vascular, and started on hydrochlorothiazide 25mg/d at last visit due to LE edema.  BP  lower today. Not checking BPs at home.  She does think going back on the hydrochlorothiazide has helped her LE edema. No lightheadedness of dizziness back on it.      She did have a fall this past week.  Caught the tip of her shoe at the bottom step of the two steps going up from their garage to the kitchen.  She was bringing groceries into the kitchen. Happened so quickly- landed on her left side.  Two steps, very short, fell right into the kitchen.   Lots of bruising on left lateral leg. No other issues, not really sore unless she pushes on her patella.  Had to get on her knees to be able to get on her own, but was able to get herself up.    Wt has been trending down.  Most days, still has 3 meals.  Not putting a lot of creativity in it.  Trying to get protein and vegetables, balance.          10/22/2024   General Health   How would you rate your overall physical health? Good   Feel stress (tense, anxious, or unable to sleep) To some extent      (!) STRESS CONCERN      10/22/2024   Nutrition   Diet: Regular (no restrictions)            10/22/2024   Exercise   Days per week of moderate/strenous exercise 0 days   Average minutes spent exercising at this level 0 min      (!) EXERCISE CONCERN      10/22/2024   Social Factors   Frequency of gathering with friends or relatives Once a week   Worry food won't last until get money to buy more No   Food not last or not have enough money for food? No   Do you have housing? (Housing is defined as stable permanent housing and does not include staying ouside in a car, in a tent, in an abandoned building, in an overnight shelter, or couch-surfing.) Yes   Are you worried about losing your housing? No   Lack of transportation? No   Unable to get utilities (heat,electricity)? No            10/22/2024   Fall Risk   Fallen 2 or more times in the past year? Yes   Trouble with walking or balance? No   Reason Gait Speed Test Not Completed Patient declines   Reason for decline unable to  complete due to clinic renovations             10/22/2024   Activities of Daily Living- Home Safety   Needs help with the following daily activites None of the above   Safety concerns in the home Throw rugs in the hallway            10/22/2024   Dental   Dentist two times every year? Yes            10/22/2024   Hearing Screening   Hearing concerns? (!) I FEEL THAT PEOPLE ARE MUMBLING OR NOT SPEAKING CLEARLY.    (!) I NEED TO ASK PEOPLE TO SPEAK UP OR REPEAT THEMSELVES.       Multiple values from one day are sorted in reverse-chronological order         10/22/2024   Driving Risk Screening   Patient/family members have concerns about driving No            10/22/2024   General Alertness/Fatigue Screening   Have you been more tired than usual lately? (!) YES            10/22/2024   Urinary Incontinence Screening   Bothered by leaking urine in past 6 months Yes            10/22/2024   TB Screening   Were you born outside of the [place]? No                  10/22/2024   Substance Use   Alcohol more than 3/day or more than 7/wk No   Do you have a current opioid prescription? No   How severe/bad is pain from 1 to 10? 0/10 (No Pain)   Do you use any other substances recreationally? No        Social History     Tobacco Use    Smoking status: Former     Current packs/day: 0.00     Average packs/day: 1 pack/day for 20.0 years (20.0 ttl pk-yrs)     Types: Cigarettes     Start date: 1965     Quit date: 1985     Years since quittin.8    Smokeless tobacco: Never    Tobacco comments:     20 yrs smoking '65-'85, 1 PPD   Vaping Use    Vaping status: Never Used   Substance Use Topics    Alcohol use: Yes     Alcohol/week: 0.0 standard drinks of alcohol     Comment: Occasional 2 beers weekly. 1-2 glasses wime monthly    Drug use: Yes     Types: Marijuana     Comment: occ cbd gummies           8/10/2023   LAST FHS-7 RESULTS   1st degree relative breast or ovarian cancer No   Any relative bilateral breast cancer No   Any male have  breast cancer No   Any ONE woman have BOTH breast AND ovarian cancer No   Any woman with breast cancer before 50yrs No   2 or more relatives with breast AND/OR ovarian cancer No   2 or more relatives with breast AND/OR bowel cancer No           Mammogram Screening - After age 74- determine frequency with patient based on health status, life expectancy and patient goals    ASCVD Risk   The 10-year ASCVD risk score (Rodriguez NASCIMENTO, et al., 2019) is: 24.1%    Values used to calculate the score:      Age: 79 years      Sex: Female      Is Non- : No      Diabetic: No      Tobacco smoker: No      Systolic Blood Pressure: 109 mmHg      Is BP treated: Yes      HDL Cholesterol: 56 mg/dL      Total Cholesterol: 146 mg/dL            Reviewed and updated as needed this visit by Provider                    Lab work is in process  Labs reviewed in EPIC  BP Readings from Last 3 Encounters:   10/22/24 109/58   08/14/24 127/76   05/15/24 105/58    Wt Readings from Last 3 Encounters:   10/22/24 114.5 kg (252 lb 8 oz)   08/14/24 118.5 kg (261 lb 3.2 oz)   05/15/24 119.8 kg (264 lb 1.6 oz)                  Current providers sharing in care for this patient include:  Patient Care Team:  Marie Robledo MD as PCP - General  Dali Mcagrry MD as MD (Urology)  Edel Montenegro, RN as Specialty Care Coordinator (Urology)  Marie Robledo MD as Assigned PCP  Nohemy Ge PA-C as Physician Assistant (Urology)  Nohemy Ge PA-C as Assigned Surgical Provider  Enrique Malik AuD (Audiology)  Williams Santos MD as Assigned Heart and Vascular Provider    The following health maintenance items are reviewed in Epic and correct as of today:  Health Maintenance   Topic Date Due    ANNUAL REVIEW OF HM ORDERS  10/27/2023    LIPID  10/13/2024    BMP  05/15/2025    EGD  07/26/2025    MEDICARE ANNUAL WELLNESS VISIT  10/22/2025    FALL RISK ASSESSMENT  10/22/2025    DEXA   "08/10/2026    GLUCOSE  05/15/2027    DTAP/TDAP/TD IMMUNIZATION (3 - Td or Tdap) 05/01/2028    COLORECTAL CANCER SCREENING  11/06/2028    ADVANCE CARE PLANNING  10/22/2029    HEPATITIS C SCREENING  Completed    PHQ-2 (once per calendar year)  Completed    INFLUENZA VACCINE  Completed    Pneumococcal Vaccine: 65+ Years  Completed    ZOSTER IMMUNIZATION  Completed    RSV VACCINE  Completed    COVID-19 Vaccine  Completed    HPV IMMUNIZATION  Aged Out    MENINGITIS IMMUNIZATION  Aged Out    RSV MONOCLONAL ANTIBODY  Aged Out    MAMMO SCREENING  Discontinued         Review of Systems  Constitutional, neuro, ENT, endocrine, pulmonary, cardiac, gastrointestinal, genitourinary, musculoskeletal, integument and psychiatric systems are negative, except as otherwise noted.     Objective    Exam  /58 (BP Location: Left arm, Patient Position: Sitting, Cuff Size: Adult Regular)   Pulse 89   Temp 98.4  F (36.9  C) (Temporal)   Resp 16   Ht 1.689 m (5' 6.5\")   Wt 114.5 kg (252 lb 8 oz)   LMP  (LMP Unknown)   SpO2 96%   BMI 40.14 kg/m     Estimated body mass index is 40.14 kg/m  as calculated from the following:    Height as of this encounter: 1.689 m (5' 6.5\").    Weight as of this encounter: 114.5 kg (252 lb 8 oz).    Physical Exam  GENERAL: alert and no distress  EYES: Eyes grossly normal to inspection, PERRL and conjunctivae and sclerae normal  HENT: ear canals and TM's normal, nose and mouth without ulcers or lesions  NECK: no adenopathy, no asymmetry, masses, or scars  RESP: lungs clear to auscultation - no rales, rhonchi or wheezes  CV: regular rate and rhythm, normal S1 S2, no S3 or S4, no murmur, click or rub, no peripheral edema  ABDOMEN: soft, nontender, no hepatosplenomegaly, no masses and bowel sounds normal  MS: no gross musculoskeletal defects noted, 1+ LE edema  NEURO: Normal strength and tone, mentation intact and speech normal  PSYCH: mentation appears normal, affect normal/bright         10/22/2024 "   Mini Cog   Clock Draw Score 2 Normal   3 Item Recall 3 objects recalled   Mini Cog Total Score 5                 Signed Electronically by: Marie Robledo MD

## 2024-10-22 NOTE — PROGRESS NOTES
Preventive Care Visit  [facility]  Marie Robledo MD, Family Medicine  Oct 22, 2024  {Provider  Link to SmartSet :035331}    {PROVIDER CHARTING PREFERENCE:913605}    Subjective   Hiro Bloom is a 79 year old, presenting for the following:  No chief complaint on file.    {(!) Visit Details have not yet been documented.  Please enter Visit Details and then use this list to pull in documentation. (Optional):740204}  {ROOMER if patient is in their first year of Medicare a vision screen is required click here to document the Vison screen and then refresh the note to pull in results  :065325}    Health Care Directive  Patient has a Health Care Directive on file  {(AWV REQUIRED) Advanced Care Planning Reviewed:218451}    HPI    Wellness Visit Notes:   {Provider able to cut/paste text below, and include in Assessment/Plan documentation. Edit to reflect final plan. Delete this text.}  -Mammogram: Last done 8/2023 (impression:  negative ). Previously discontinued.   -DEXA: Last done 8/2023 (impression: osteopenia). Due 8/2026.  -PAP: Last done 8/2011 (impression: NIL). Provider to review PAP recommendations.   -Colon Cancer Screening: Last done via colonoscopy on 11/2023. (Impression: diverticulosis in sigmoid colon, exam was otherwise normal, no specimens collected). Due 11/2028.   -Dermatology: Pt verbalized they {Dermatology AWV:544309}.  -Immunizations: Patient is due/able to receive at clinic today: Flu - *** and Covid - ***     ***  {MA/LPN/RN Pre-Provider Visit Orders- hCG/UA/Strep (Optional):216175}  {SUPERLIST (Optional):191490}  {additonal problems for provider to add (Optional):044092}      10/13/2023   General Health   How would you rate your overall physical health? Good            10/13/2023   Nutrition   At least 4 servings of fruits and vegetables/day No            10/13/2023   Exercise   Frequency of exercise: None            10/13/2023   Social Factors   Worry food won't last until  get money to buy more No   Food not last or not have enough money for food? No   Do you have housing? (Housing is defined as stable permanent housing and does not include staying ouside in a car, in a tent, in an abandoned building, in an overnight shelter, or couch-surfing.) Yes   Are you worried about losing your housing? No   Lack of transportation? No   Unable to get utilities (heat,electricity)? No            10/13/2023   Activities of Daily Living- Home Safety   Needs help with the following daily activites NO assistance is needed   Safety concerns in the home Throw rugs in the hallway            2018   Dental   Dentist two times every year? No            10/13/2023   Hearing Screening   Hearing concerns? Difficulty following a conversation in a noisy restaurant or crowded room               No data to display                   {Rooming Staff Patient needs a PHQ as part of the AWV.  Use this link to complete and then refresh the note to pull results Link to PHQ2 Assessment :254825}  {USE TO PULL IN PHQ RESULTS FOR TODAY:330465}      10/13/2023   Substance Use   Alcohol more than 3/day or more than 7/wk No        Social History     Tobacco Use    Smoking status: Former     Current packs/day: 0.00     Average packs/day: 1 pack/day for 20.0 years (20.0 ttl pk-yrs)     Types: Cigarettes     Start date: 1965     Quit date: 1985     Years since quittin.8    Smokeless tobacco: Never    Tobacco comments:     20 yrs smoking '65-'85, 1 PPD   Vaping Use    Vaping status: Never Used   Substance Use Topics    Alcohol use: Yes     Alcohol/week: 0.0 standard drinks of alcohol     Comment: Occasional 2 beers weekly. 1-2 glasses wime monthly    Drug use: Yes     Types: Marijuana     Comment: occ cbd gummies     {Provider  If there are gaps in the social history shown above, please follow the link to update and then refresh the note Link to Social and Substance History :537436}      8/10/2023   LAST FHS-7  RESULTS   1st degree relative breast or ovarian cancer No   Any relative bilateral breast cancer No   Any male have breast cancer No   Any ONE woman have BOTH breast AND ovarian cancer No   Any woman with breast cancer before 50yrs No   2 or more relatives with breast AND/OR ovarian cancer No   2 or more relatives with breast AND/OR bowel cancer No        {If any of the questions to the FHS7 are answered yes, consider referral for genetic counseling.    Additional indications for genetic referral include personal history of breast or ovarian cancer, genetic mutation in 1st degree relative which increases risk of breast cancer including BRCA1, BRCA2, KARO, PALB 2, TP53, CHEK2, PTEN, CDH1, STK11 (per ACS) and/or 1st degree relative with history of pancreatic or high-risk prostate cancer (per NCCN):966766}   {Mammogram Decision Support (Optional):057412}    ASCVD Risk   The 10-year ASCVD risk score (Rodriguez NASCIMENTO, et al., 2019) is: 31.3%    Values used to calculate the score:      Age: 79 years      Sex: Female      Is Non- : No      Diabetic: No      Tobacco smoker: No      Systolic Blood Pressure: 127 mmHg      Is BP treated: Yes      HDL Cholesterol: 56 mg/dL      Total Cholesterol: 146 mg/dL    {Link to Fracture Risk Assessment Tool (Optional):898432}    {Provider  REQUIRED FOR AWV Use the storyboard to review patient history, after sections have been marked as reviewed, refresh note to capture documentation:940669}  {Provider   REQUIRED AWV use this link to review and update sexual activity history  after section has been marked as reviewed, refresh note to capture documentation:038600}  Reviewed and updated as needed this visit by Provider                    {HISTORY OPTIONS (Optional):812874}  Current providers sharing in care for this patient include:  Patient Care Team:  Marie Robledo MD as PCP - General  Dali Mcgarry MD as MD (Urology)  Edel Montenegro RN as  "Specialty Care Coordinator (Urology)  Marie Robledo MD as Assigned PCP  Nohemy Ge PA-C as Physician Assistant (Urology)  Nohemy Ge PA-C as Assigned Surgical Provider  Enrique Malik AuD (Audiology)  Williams Santos MD as Assigned Heart and Vascular Provider    The following health maintenance items are reviewed in Epic and correct as of today:  Health Maintenance   Topic Date Due    ANNUAL REVIEW OF HM ORDERS  10/27/2023    INFLUENZA VACCINE (1) 09/01/2024    COVID-19 Vaccine (9 - 2024-25 season) 09/01/2024    LIPID  10/13/2024    FALL RISK ASSESSMENT  10/13/2024    MEDICARE ANNUAL WELLNESS VISIT  10/13/2024    BMP  05/15/2025    EGD  07/26/2025    DEXA  08/10/2026    GLUCOSE  05/15/2027    DTAP/TDAP/TD IMMUNIZATION (3 - Td or Tdap) 05/01/2028    ADVANCE CARE PLANNING  11/06/2028    COLORECTAL CANCER SCREENING  11/06/2028    HEPATITIS C SCREENING  Completed    PHQ-2 (once per calendar year)  Completed    Pneumococcal Vaccine: 65+ Years  Completed    ZOSTER IMMUNIZATION  Completed    RSV VACCINE  Completed    HPV IMMUNIZATION  Aged Out    MENINGITIS IMMUNIZATION  Aged Out    RSV MONOCLONAL ANTIBODY  Aged Out    MAMMO SCREENING  Discontinued       {ROS Picklists (Optional):845781}     Objective    Exam  LMP  (LMP Unknown)    Estimated body mass index is 40.67 kg/m  as calculated from the following:    Height as of 8/14/24: 1.707 m (5' 7.2\").    Weight as of 8/14/24: 118.5 kg (261 lb 3.2 oz).    Physical Exam  {Exam Choices (Optional):410104}  {Provider  The Mini-Cog is incomplete, use link to complete and refresh note Link to Mini-Cog :693498}       No data to display              {A Mini-Cog total score of 0-2 suggests the possibility of dementia, score of 3-5 suggests no dementia:740182}         Signed Electronically by: Marie Robledo MD  {Email feedback regarding this note to [email or web address]   :784843}  "

## 2024-10-22 NOTE — PATIENT INSTRUCTIONS
Patient Education   Preventive Care Advice   This is general advice given by our system to help you stay healthy. However, your care team may have specific advice just for you. Please talk to your care team about your preventive care needs.  Nutrition  Eat 5 or more servings of fruits and vegetables each day.  Try wheat bread, brown rice and whole grain pasta (instead of white bread, rice, and pasta).  Get enough calcium and vitamin D. Check the label on foods and aim for 100% of the RDA (recommended daily allowance).  Lifestyle  Exercise at least 150 minutes each week  (30 minutes a day, 5 days a week).  Do muscle strengthening activities 2 days a week. These help control your weight and prevent disease.  No smoking.  Wear sunscreen to prevent skin cancer.  Have a dental exam and cleaning every 6 months.  Yearly exams  See your health care team every year to talk about:  Any changes in your health.  Any medicines your care team has prescribed.  Preventive care, family planning, and ways to prevent chronic diseases.  Shots (vaccines)   HPV shots (up to age 26), if you've never had them before.  Hepatitis B shots (up to age 59), if you've never had them before.  COVID-19 shot: Get this shot when it's due.  Flu shot: Get a flu shot every year.  Tetanus shot: Get a tetanus shot every 10 years.  Pneumococcal, hepatitis A, and RSV shots: Ask your care team if you need these based on your risk.  Shingles shot (for age 50 and up)  General health tests  Diabetes screening:  Starting at age 35, Get screened for diabetes at least every 3 years.  If you are younger than age 35, ask your care team if you should be screened for diabetes.  Cholesterol test: At age 39, start having a cholesterol test every 5 years, or more often if advised.  Bone density scan (DEXA): At age 50, ask your care team if you should have this scan for osteoporosis (brittle bones).  Hepatitis C: Get tested at least once in your life.  STIs (sexually  transmitted infections)  Before age 24: Ask your care team if you should be screened for STIs.  After age 24: Get screened for STIs if you're at risk. You are at risk for STIs (including HIV) if:  You are sexually active with more than one person.  You don't use condoms every time.  You or a partner was diagnosed with a sexually transmitted infection.  If you are at risk for HIV, ask about PrEP medicine to prevent HIV.  Get tested for HIV at least once in your life, whether you are at risk for HIV or not.  Cancer screening tests  Cervical cancer screening: If you have a cervix, begin getting regular cervical cancer screening tests starting at age 21.  Breast cancer scan (mammogram): If you've ever had breasts, begin having regular mammograms starting at age 40. This is a scan to check for breast cancer.  Colon cancer screening: It is important to start screening for colon cancer at age 45.  Have a colonoscopy test every 10 years (or more often if you're at risk) Or, ask your provider about stool tests like a FIT test every year or Cologuard test every 3 years.  To learn more about your testing options, visit:   .  For help making a decision, visit:   https://bit.ly/ys46459.  Prostate cancer screening test: If you have a prostate, ask your care team if a prostate cancer screening test (PSA) at age 55 is right for you.  Lung cancer screening: If you are a current or former smoker ages 50 to 80, ask your care team if ongoing lung cancer screenings are right for you.  For informational purposes only. Not to replace the advice of your health care provider. Copyright   2023 [employer]. All rights reserved. Clinically reviewed by the [facility]itions [facility]. [employer] [id number] - REV 01/24.

## 2024-12-27 ENCOUNTER — ANCILLARY PROCEDURE (OUTPATIENT)
Dept: MAMMOGRAPHY | Facility: HOSPITAL | Age: 79
End: 2024-12-27
Attending: FAMILY MEDICINE
Payer: COMMERCIAL

## 2024-12-27 DIAGNOSIS — Z00.00 ENCOUNTER FOR MEDICARE ANNUAL WELLNESS EXAM: ICD-10-CM

## 2024-12-27 DIAGNOSIS — Z12.31 ENCOUNTER FOR SCREENING MAMMOGRAM FOR MALIGNANT NEOPLASM OF BREAST: ICD-10-CM

## 2024-12-27 PROCEDURE — 77067 SCR MAMMO BI INCL CAD: CPT

## 2024-12-27 PROCEDURE — 77063 BREAST TOMOSYNTHESIS BI: CPT

## 2025-02-03 ENCOUNTER — VIRTUAL VISIT (OUTPATIENT)
Dept: FAMILY MEDICINE | Facility: CLINIC | Age: 80
End: 2025-02-03
Payer: COMMERCIAL

## 2025-02-03 ENCOUNTER — NURSE TRIAGE (OUTPATIENT)
Dept: FAMILY MEDICINE | Facility: CLINIC | Age: 80
End: 2025-02-03

## 2025-02-03 DIAGNOSIS — J01.00 ACUTE NON-RECURRENT MAXILLARY SINUSITIS: Primary | ICD-10-CM

## 2025-02-03 PROCEDURE — 98013 SYNCH AUDIO-ONLY EST LOW 20: CPT | Performed by: FAMILY MEDICINE

## 2025-02-03 NOTE — PROGRESS NOTES
Hiro Bloom is a 79 year old who is being evaluated via a billable video visit.    How would you like to obtain your AVS? MyChart  If the video visit is dropped, the invitation should be resent by: Text to cell phone: [phone]  Will anyone else be joining your video visit? No      Assessment & Plan     1. Acute non-recurrent maxillary sinusitis (Primary)  Plan:- amoxicillin-clavulanate (AUGMENTIN) 875-125 MG tablet; Take 1 tablet by mouth 2 times daily.  Dispense: 14 tablet; Refill: 0    We discussed possibility of viral infection as well  Potential medication side effects were discussed with the patient; let me know if any occur.  Good hydration    Follow up next week as OV if not better  Follow up as needed  earlier if worsening concerns         Prescription drug management  I spent a total of 20 minutes on the day of the visit.   Time spent by me today doing chart review, history and exam, documentation and further activities per the note      Subjective   Hiro Bloom is a 79 year old, presenting for the following health issues:  URI        10/22/2024    10:35 AM   Additional Questions   Roomed by WENDY Mcmahon   Accompanied by N/A       Video Start Time: attempted and ap changed to telephone only appointment       History of Present Illness       Reason for visit:  Heavy green mucus. with coughing  Symptom onset:  3-7 days ago  Symptoms include:  Low energy, cough, mucus  Symptom intensity:  Moderate  Symptom progression:  Staying the same  Had these symptoms before:  No  What makes it worse:  N/a  What makes it better:  N/a   She is taking medications regularly.   Sick for a few  days   improved much better-   She has not improved  Except cough better , used  reddy zonate /tessalon perrls    Mostly nasal congestion, moderate sinus headache   Sinus pressure moderate.    Does not think its flu or covid like syndrome    Christian fever, bodyaches. Breathing problems              Review of Systems  Constitutional,  HEENT, cardiovascular, pulmonary, GI, , musculoskeletal, neuro, skin, endocrine and psych systems are negative, except as otherwise noted.      Objective    Vitals - Patient Reported  Systolic (Patient Reported):  (n/a)  Diastolic (Patient Reported):  (n/a)  Weight (Patient Reported):  (n/a)  Height (Patient Reported):  (n/a)  SpO2 (Patient Reported):  (n/a)  Temperature (Patient Reported):  (n/a)  Pulse (Patient Reported):  (n/a)      Vitals:  No vitals were obtained today due to virtual visit.    Physical Exam   Limited to telephone only appointment  Clear voice and mentation  No wheezing or difficulty speaking    Total time on phone is 11 mins   Total time on  spent on reviewing the chart on the day of the visit is 20 mins

## 2025-02-03 NOTE — TELEPHONE ENCOUNTER
Patient calling for treatment options  Reports green mucus, lack of appetite, fatigue, cough  Symptoms began 4 days ago   Negative covid test  Current temp 98.6F  Denies difficulty breathing, chest pain   Reports history of sinus infections and requesting appointment with provider to review  States she lives far away and requesting VV if possible  Scheduled now  Patient verbalized understanding, agreed with plan of care, and will call back if further questions or concerns  Debo Roy RN'    Reason for Disposition   Patient wants to be seen    Additional Information   Negative: Bluish (or gray) lips or face   Negative: SEVERE difficulty breathing (e.g., struggling for each breath, speaks in single words)   Negative: Rapid onset of cough and has hives   Negative: Coughing started suddenly after medicine, an allergic food or bee sting   Negative: Difficulty breathing after exposure to flames, smoke, or fumes   Negative: Sounds like a life-threatening emergency to the triager   Negative: Previous asthma attacks and this feels like asthma attack   Negative: Dry cough (non-productive; no sputum or minimal clear sputum) and within 14 days of COVID-19 Exposure   Negative: MODERATE difficulty breathing (e.g., speaks in phrases, SOB even at rest, pulse 100-120) and still present when not coughing   Negative: Chest pain present when not coughing   Negative: Passed out (e.g., fainted, lost consciousness, blacked out and was not responding)   Negative: Patient sounds very sick or weak to the triager   Negative: MILD difficulty breathing (e.g., minimal/no SOB at rest, SOB with walking, pulse <100) and still present when not coughing   Negative: Coughed up > 1 tablespoon (15 ml) blood (Exception: Blood-tinged sputum.)   Negative: Fever > 103 F (39.4 C)   Negative: Fever > 101 F (38.3 C) and over 60 years of age   Negative: Fever > 100 F (37.8 C) and has diabetes mellitus or a weak immune system (e.g., HIV positive,  cancer chemotherapy, organ transplant, splenectomy, chronic steroids)   Negative: Fever > 100 F (37.8 C) and bedridden (e.g., CVA, chronic illness, recovering from surgery)   Negative: Increasing ankle swelling   Negative: Wheezing is present   Negative: SEVERE coughing spells (e.g., whooping sound after coughing, vomiting after coughing)   Negative: Coughing up sherif-colored (reddish-brown) or blood-tinged sputum   Negative: Fever present > 3 days (72 hours)   Negative: Fever returns after gone for over 24 hours and symptoms worse or not improved   Negative: Using nasal washes and pain medicine > 24 hours and sinus pain persists   Negative: Known COPD or other severe lung disease (i.e., bronchiectasis, cystic fibrosis, lung surgery) and symptoms getting worse (i.e., increased sputum purulence or amount, increased breathing difficulty)   Negative: Continuous (nonstop) coughing interferes with work or school and no improvement using cough treatment per Care Advice    Protocols used: Cough-A-OH

## 2025-04-07 ENCOUNTER — OFFICE VISIT (OUTPATIENT)
Dept: UROLOGY | Facility: CLINIC | Age: 80
End: 2025-04-07
Payer: COMMERCIAL

## 2025-04-07 DIAGNOSIS — N39.41 URGE INCONTINENCE: Primary | ICD-10-CM

## 2025-04-07 PROCEDURE — 99214 OFFICE O/P EST MOD 30 MIN: CPT | Performed by: PHYSICIAN ASSISTANT

## 2025-04-07 RX ORDER — TOLTERODINE 4 MG/1
4 CAPSULE, EXTENDED RELEASE ORAL DAILY
Qty: 90 CAPSULE | Refills: 3 | Status: SHIPPED | OUTPATIENT
Start: 2025-04-07

## 2025-04-07 RX ORDER — MIRABEGRON 50 MG/1
50 TABLET, FILM COATED, EXTENDED RELEASE ORAL DAILY
Qty: 90 TABLET | Refills: 3 | Status: SHIPPED | OUTPATIENT
Start: 2025-04-07

## 2025-04-07 NOTE — NURSING NOTE
Hiro Carranza's goals for this visit include:   Chief Complaint   Patient presents with    RECHECK     Urge incontinence       She requests these members of her care team be copied on today's visit information:       PCP: Marie Robledo    Referring Provider:  Referred Self, MD  No address on file    LMP  (LMP Unknown)     Do you need any medication refills at today's visit?     Nova Wang LPN on 4/7/2025 at 10:25 AM

## 2025-04-07 NOTE — PROGRESS NOTES
Urology Clinic      Name: Hiro Carranza    MRN: [id number]   YOB: 1945  Accompanied at today's visit by:Marli [employer] student                 Chief Complaint:   UUI          History of Present Illness:   April 7, 2025    HISTORY:   We have been following 79 year old Hiro Carranza for UUI and MAUREEN. Cysto in 7/14/21 was normal. Has failed oxybutynin and PFPT. She had completed 12 weeks of PTNS in 8/2022 and doing monthly PTNS but was not helpful. Last seen on 4/5/24 by Nohemy Ge and was advised to continue Detrol LA 4mg daily and myrbetriq 25mg daily. Here today for follow-up. Still not pleased with results of medication. Going through 1 pad per day. Denies any s/s of UTI today or having recent UTIs. Patient voices no other concerns at this time.            Allergies:     Allergies   Allergen Reactions    No Known Drug Allergy             Medications:     Current Outpatient Medications   Medication Sig Dispense Refill    amoxicillin-clavulanate (AUGMENTIN) 875-125 MG tablet Take 1 tablet by mouth 2 times daily. 14 tablet 0    cephALEXin (KEFLEX) 500 MG capsule as needed Taking before dental procedures/exams      Cholecalciferol (VITAMIN D3 PO) Take by mouth daily      clotrimazole (LOTRIMIN) 1 % external cream Apply topically 2 times daily 90 g 1    COMPOUNDED NON-CONTROLLED SUBSTANCE (CMPD RX) - PHARMACY TO MIX COMPOUNDED MEDICATION Estriol 1 mg/g Apply small amount to finger and apply to inside vagina twice weekly Route: vaginally 30 g 5    DAILY MULTIVITAMIN PO DAILY      FISH  MG OR CAPS 2 tablets daily      hydrochlorothiazide (HYDRODIURIL) 25 MG tablet Take 1 tablet (25 mg) by mouth daily. 90 tablet 1    Ibuprofen (ADVIL PO)       losartan (COZAAR) 25 MG tablet TAKE ONE-HALF TABLET BY MOUTH ONCE DAILY 45 tablet 2    mirabegron (MYRBETRIQ) 50 MG 24 hr tablet Take 1 tablet (50 mg) by mouth daily. 90 tablet 3    omeprazole (PRILOSEC) 20 MG DR capsule Take 1 capsule (20 mg) by mouth 2 times daily. 180  capsule 3    polyethylene glycol 400 (BLINK TEARS) 0.25 % SOLN ophthalmic solution 1 drop      rosuvastatin (CRESTOR) 20 MG tablet Take 1 tablet (20 mg) by mouth daily. 30 tablet 1    tolterodine ER (DETROL LA) 4 MG 24 hr capsule Take 1 capsule (4 mg) by mouth daily 90 capsule 3    valACYclovir (VALTREX) 500 MG tablet Take one tablet by mouth daily x 3 days if outbreak 36 tablet 0     No current facility-administered medications for this visit.               Past  Surgical History:     Past Surgical History:   Procedure Laterality Date    ARTHROPLASTY KNEE Right 6/10/2015    Procedure: ARTHROPLASTY KNEE;  Surgeon: Jorge Luis Snyder MD;  Location:  OR    ESOPHAGOSCOPY, GASTROSCOPY, DUODENOSCOPY (EGD), COMBINED N/A 5/21/2015    Procedure: COMBINED ESOPHAGOSCOPY, GASTROSCOPY, DUODENOSCOPY (EGD), BIOPSY SINGLE OR MULTIPLE;  Surgeon: Venkatesh Aguilera MD;  Location:  GI    ESOPHAGOSCOPY, GASTROSCOPY, DUODENOSCOPY (EGD), COMBINED N/A 6/21/2018    Procedure: COMBINED ESOPHAGOSCOPY, GASTROSCOPY, DUODENOSCOPY (EGD), BIOPSY SINGLE OR MULTIPLE;  gastroscopy;  Surgeon: Venkatesh Aguilera MD;  Location:  GI    LAPAROSCOPIC CHOLECYSTECTOMY N/A 8/4/2016    Procedure: LAPAROSCOPIC CHOLECYSTECTOMY;  Surgeon: Venkatesh Ford MD;  Location:  OR    SURGICAL HISTORY OF -   2008    R cataract    [facility] NONSPECIFIC PROCEDURE  2004    ovarian cyst drainage, resection of fibroid tumors    [facility] NONSPECIFIC PROCEDURE  2005    L cataract    [facility] NONSPECIFIC PROCEDURE  6/08    L knee replacement    [facility] RAD RESEC TONSIL/PILLARS  1953    [facility] SHOULDER SURG PROC UNLISTED  2009    lt shoulder arthritis- replacement    [facility] TOTAL KNEE ARTHROPLASTY      left knee total 08             Physical Exam:   There were no vitals filed for this visit.  PSYCH: NAD  EYES: EOMI  NEURO: AAO x3    LABS:   Creatinine   Date Value Ref Range Status   05/15/2024 0.67 0.51 - 0.95 mg/dL Final   05/28/2021 0.68 0.52 - 1.04 mg/dL Final            Assessment and  Plan:   79 year old is a pleasant female who has UUI    Plan:  -  increase myrbetriq to 50mg daily and continue Detrol LA 4mg daily.  - follow-up in 3 months in [place] with PVR and BP check.   - discussed third line options and handouts given.   - After discussing the assessment and plan with patient, patient verbalizes understanding and agrees to the above plan. All questions answered.     12 minutes spent on the date of the encounter doing chart review, review of labs, review of Nohemy Ge's note, review of test results, interpretation of tests, patient visit and documentation.      Karol Tam PA-C  Urology  April 7, 2025      Patient Care Team:  Marie Robledo MD as PCP - Dali Johnston MD as MD (Urology)  Edel Montenegro, RN as Specialty Care Coordinator (Urology)  Marie Robledo MD as Assigned PCP  Nohemy Ge PA-C as Physician Assistant (Urology)  Nohemy Ge PA-C as Assigned Surgical Provider  Enrique Malik AuD (Audiology)  Williams Santos MD as Assigned Heart and Vascular Provider  SELF, REFERRED

## 2025-04-21 DIAGNOSIS — I10 ESSENTIAL HYPERTENSION: ICD-10-CM

## 2025-04-21 DIAGNOSIS — R60.0 BILATERAL LEG EDEMA: ICD-10-CM

## 2025-04-22 RX ORDER — HYDROCHLOROTHIAZIDE 25 MG/1
25 TABLET ORAL DAILY
Qty: 90 TABLET | Refills: 2 | Status: SHIPPED | OUTPATIENT
Start: 2025-04-22

## (undated) RX ORDER — LIDOCAINE HYDROCHLORIDE 20 MG/ML
JELLY TOPICAL
Status: DISPENSED
Start: 2021-07-14

## (undated) RX ORDER — FENTANYL CITRATE 50 UG/ML
INJECTION, SOLUTION INTRAMUSCULAR; INTRAVENOUS
Status: DISPENSED
Start: 2018-06-21